# Patient Record
Sex: MALE | Race: BLACK OR AFRICAN AMERICAN | Employment: OTHER | URBAN - METROPOLITAN AREA
[De-identification: names, ages, dates, MRNs, and addresses within clinical notes are randomized per-mention and may not be internally consistent; named-entity substitution may affect disease eponyms.]

---

## 2017-05-11 ENCOUNTER — OFFICE VISIT (OUTPATIENT)
Dept: CARDIOLOGY CLINIC | Age: 67
End: 2017-05-11

## 2017-05-11 VITALS
HEIGHT: 72 IN | BODY MASS INDEX: 34.29 KG/M2 | HEART RATE: 92 BPM | DIASTOLIC BLOOD PRESSURE: 86 MMHG | SYSTOLIC BLOOD PRESSURE: 134 MMHG | WEIGHT: 253.2 LBS

## 2017-05-11 DIAGNOSIS — E78.2 MIXED HYPERLIPIDEMIA: ICD-10-CM

## 2017-05-11 DIAGNOSIS — E66.01 MORBID OBESITY DUE TO EXCESS CALORIES (HCC): ICD-10-CM

## 2017-05-11 DIAGNOSIS — I25.10 CORONARY ARTERY DISEASE INVOLVING NATIVE CORONARY ARTERY OF NATIVE HEART WITHOUT ANGINA PECTORIS: Primary | Chronic | ICD-10-CM

## 2017-05-11 DIAGNOSIS — R06.09 DOE (DYSPNEA ON EXERTION): ICD-10-CM

## 2017-05-11 DIAGNOSIS — R42 DIZZINESS: ICD-10-CM

## 2017-05-11 DIAGNOSIS — I25.10 2-VESSEL CORONARY ARTERY DISEASE: ICD-10-CM

## 2017-05-11 PROCEDURE — 4040F PNEUMOC VAC/ADMIN/RCVD: CPT | Performed by: INTERNAL MEDICINE

## 2017-05-11 PROCEDURE — G8427 DOCREV CUR MEDS BY ELIG CLIN: HCPCS | Performed by: INTERNAL MEDICINE

## 2017-05-11 PROCEDURE — G8419 CALC BMI OUT NRM PARAM NOF/U: HCPCS | Performed by: INTERNAL MEDICINE

## 2017-05-11 PROCEDURE — 1036F TOBACCO NON-USER: CPT | Performed by: INTERNAL MEDICINE

## 2017-05-11 PROCEDURE — G8598 ASA/ANTIPLAT THER USED: HCPCS | Performed by: INTERNAL MEDICINE

## 2017-05-11 PROCEDURE — 1123F ACP DISCUSS/DSCN MKR DOCD: CPT | Performed by: INTERNAL MEDICINE

## 2017-05-11 PROCEDURE — 3017F COLORECTAL CA SCREEN DOC REV: CPT | Performed by: INTERNAL MEDICINE

## 2017-05-11 PROCEDURE — 99213 OFFICE O/P EST LOW 20 MIN: CPT | Performed by: INTERNAL MEDICINE

## 2017-05-11 RX ORDER — ATORVASTATIN CALCIUM 80 MG/1
80 TABLET, FILM COATED ORAL DAILY
Qty: 90 TABLET | Refills: 3 | Status: SHIPPED | OUTPATIENT
Start: 2017-05-11 | End: 2018-03-19 | Stop reason: SDUPTHER

## 2017-05-11 RX ORDER — METOPROLOL TARTRATE 50 MG/1
50 TABLET, FILM COATED ORAL 2 TIMES DAILY
Qty: 180 TABLET | Refills: 3 | Status: SHIPPED | OUTPATIENT
Start: 2017-05-11 | End: 2018-03-18 | Stop reason: SDUPTHER

## 2017-05-21 ENCOUNTER — HOSPITAL ENCOUNTER (OUTPATIENT)
Dept: OTHER | Age: 67
Discharge: HOME | End: 2017-05-21
Attending: CLINIC/CENTER | Admitting: CLINIC/CENTER

## 2017-05-23 LAB
CULTURE: NORMAL
REPORT STATUS: NORMAL
REQUEST PROBLEM: NORMAL
SPECIMEN: NORMAL

## 2017-05-26 ENCOUNTER — HOSPITAL ENCOUNTER (OUTPATIENT)
Dept: GENERAL RADIOLOGY | Age: 67
Discharge: HOME | End: 2017-05-26
Attending: INTERNAL MEDICINE | Admitting: INTERNAL MEDICINE

## 2017-05-26 LAB
ALBUMIN SERPL-MCNC: 4.1 GM/DL (ref 3.4–5)
ALP BLD-CCNC: 112 IU/L (ref 40–128)
ALT SERPL-CCNC: 39 U/L (ref 10–40)
ANION GAP SERPL CALCULATED.3IONS-SCNC: 13 MMOL/L (ref 4–16)
AST SERPL-CCNC: 30 IU/L (ref 15–37)
BASOPHILS ABSOLUTE: 0.1 K/CU MM
BASOPHILS RELATIVE PERCENT: 0.8 % (ref 0–1)
BILIRUB SERPL-MCNC: 0.3 MG/DL (ref 0–1)
BUN BLDV-MCNC: 8 MG/DL (ref 6–23)
CALCIUM SERPL-MCNC: 9.5 MG/DL (ref 8.3–10.6)
CHLORIDE BLD-SCNC: 104 MMOL/L (ref 99–110)
CHOLESTEROL: 120 MG/DL
CO2: 26 MMOL/L (ref 21–32)
CREAT SERPL-MCNC: 0.9 MG/DL (ref 0.9–1.3)
DIFFERENTIAL TYPE: ABNORMAL
EOSINOPHILS ABSOLUTE: 0.1 K/CU MM
EOSINOPHILS RELATIVE PERCENT: 1.5 % (ref 0–3)
GFR AFRICAN AMERICAN: >60 ML/MIN/1.73M2
GFR NON-AFRICAN AMERICAN: >60 ML/MIN/1.73M2
GLUCOSE BLD-MCNC: 118 MG/DL (ref 70–140)
HCT VFR BLD CALC: 46.5 % (ref 42–52)
HDLC SERPL-MCNC: 42 MG/DL
HEMOGLOBIN: 14.6 GM/DL (ref 13.5–18)
IMMATURE NEUTROPHIL %: 1.2 % (ref 0–0.43)
LDL CHOLESTEROL DIRECT: 67 MG/DL
LYMPHOCYTES ABSOLUTE: 2.9 K/CU MM
LYMPHOCYTES RELATIVE PERCENT: 34.2 % (ref 24–44)
MCH RBC QN AUTO: 27.7 PG (ref 27–31)
MCHC RBC AUTO-ENTMCNC: 31.4 % (ref 32–36)
MCV RBC AUTO: 88.1 FL (ref 78–100)
MONOCYTES ABSOLUTE: 0.7 K/CU MM
MONOCYTES RELATIVE PERCENT: 7.9 % (ref 0–4)
NUCLEATED RBC %: 0 %
PDW BLD-RTO: 13 % (ref 11.7–14.9)
PLATELET # BLD: 323 K/CU MM (ref 140–440)
PMV BLD AUTO: 9.6 FL (ref 7.5–11.1)
POTASSIUM SERPL-SCNC: 5.1 MMOL/L (ref 3.5–5.1)
PROSTATE SPECIFIC ANTIGEN: 4.45 NG/ML (ref 0–4)
RBC # BLD: 5.28 M/CU MM (ref 4.6–6.2)
SEGMENTED NEUTROPHILS ABSOLUTE COUNT: 4.6 K/CU MM
SEGMENTED NEUTROPHILS RELATIVE PERCENT: 54.4 % (ref 36–66)
SODIUM BLD-SCNC: 143 MMOL/L (ref 135–145)
TOTAL IMMATURE NEUTOROPHIL: 0.1 K/CU MM
TOTAL NUCLEATED RBC: 0 K/CU MM
TOTAL PROTEIN: 7.1 GM/DL (ref 6.4–8.2)
TRIGL SERPL-MCNC: 69 MG/DL
TSH HIGH SENSITIVITY: 2.54 UIU/ML (ref 0.27–4.2)
WBC # BLD: 8.5 K/CU MM (ref 4–10.5)

## 2017-10-19 ENCOUNTER — HOSPITAL ENCOUNTER (OUTPATIENT)
Dept: GENERAL RADIOLOGY | Age: 67
Discharge: HOME | End: 2017-10-19
Attending: INTERNAL MEDICINE | Admitting: INTERNAL MEDICINE

## 2017-10-19 LAB
ALBUMIN SERPL-MCNC: 4.2 GM/DL (ref 3.4–5)
ALP BLD-CCNC: 114 IU/L (ref 40–128)
ALT SERPL-CCNC: 26 U/L (ref 10–40)
ANION GAP SERPL CALCULATED.3IONS-SCNC: 14 MMOL/L (ref 4–16)
AST SERPL-CCNC: 20 IU/L (ref 15–37)
BILIRUB SERPL-MCNC: 0.3 MG/DL (ref 0–1)
BUN BLDV-MCNC: 12 MG/DL (ref 6–23)
CALCIUM SERPL-MCNC: 9.8 MG/DL (ref 8.3–10.6)
CHLORIDE BLD-SCNC: 103 MMOL/L (ref 99–110)
CHOLESTEROL: 125 MG/DL
CO2: 26 MMOL/L (ref 21–32)
CREAT SERPL-MCNC: 0.9 MG/DL (ref 0.9–1.3)
GFR AFRICAN AMERICAN: >60 ML/MIN/1.73M2
GFR NON-AFRICAN AMERICAN: >60 ML/MIN/1.73M2
GLUCOSE FASTING: 113 MG/DL (ref 70–99)
HDLC SERPL-MCNC: 45 MG/DL
LDL CHOLESTEROL DIRECT: 75 MG/DL
POTASSIUM SERPL-SCNC: 4.5 MMOL/L (ref 3.5–5.1)
SODIUM BLD-SCNC: 143 MMOL/L (ref 135–145)
TOTAL PROTEIN: 6.9 GM/DL (ref 6.4–8.2)
TRIGL SERPL-MCNC: 60 MG/DL

## 2017-11-01 ENCOUNTER — OFFICE VISIT (OUTPATIENT)
Dept: CARDIOLOGY CLINIC | Age: 67
End: 2017-11-01

## 2017-11-01 VITALS
WEIGHT: 239.2 LBS | HEIGHT: 72 IN | HEART RATE: 80 BPM | DIASTOLIC BLOOD PRESSURE: 76 MMHG | BODY MASS INDEX: 32.4 KG/M2 | SYSTOLIC BLOOD PRESSURE: 128 MMHG

## 2017-11-01 DIAGNOSIS — I25.10 CORONARY ARTERY DISEASE INVOLVING NATIVE CORONARY ARTERY OF NATIVE HEART WITHOUT ANGINA PECTORIS: Primary | Chronic | ICD-10-CM

## 2017-11-01 DIAGNOSIS — I25.10 2-VESSEL CORONARY ARTERY DISEASE: ICD-10-CM

## 2017-11-01 DIAGNOSIS — E78.2 MIXED HYPERLIPIDEMIA: ICD-10-CM

## 2017-11-01 PROCEDURE — G8598 ASA/ANTIPLAT THER USED: HCPCS | Performed by: INTERNAL MEDICINE

## 2017-11-01 PROCEDURE — 3017F COLORECTAL CA SCREEN DOC REV: CPT | Performed by: INTERNAL MEDICINE

## 2017-11-01 PROCEDURE — 99214 OFFICE O/P EST MOD 30 MIN: CPT | Performed by: INTERNAL MEDICINE

## 2017-11-01 PROCEDURE — 4040F PNEUMOC VAC/ADMIN/RCVD: CPT | Performed by: INTERNAL MEDICINE

## 2017-11-01 PROCEDURE — 1036F TOBACCO NON-USER: CPT | Performed by: INTERNAL MEDICINE

## 2017-11-01 PROCEDURE — 1123F ACP DISCUSS/DSCN MKR DOCD: CPT | Performed by: INTERNAL MEDICINE

## 2017-11-01 PROCEDURE — G8427 DOCREV CUR MEDS BY ELIG CLIN: HCPCS | Performed by: INTERNAL MEDICINE

## 2017-11-01 PROCEDURE — G8417 CALC BMI ABV UP PARAM F/U: HCPCS | Performed by: INTERNAL MEDICINE

## 2017-11-01 PROCEDURE — G8484 FLU IMMUNIZE NO ADMIN: HCPCS | Performed by: INTERNAL MEDICINE

## 2017-11-01 NOTE — PROGRESS NOTES
Aron Fuentes is a 78 y.o. male who has    CHIEF COMPLAINT AS FOLLOWS:  CHEST PAIN: Patient denies any C/O chest pains at this time. SOB: No C/O SOB at this time. LEG EDEMA: B/L Lower extremity edema is present but no change over previous. PALPITATIONS: Denies any C/O Palpitations                                   DIZZINESS: C/O positional Dizziness but no black out spells. SYNCOPE: None   OTHER:                                     HPI: Patient is here for F/U on his CAD, HTN & Dyslipidemia problems. He does not have any Cardiac complaints at this time. Zachary Tyshawn Tamayo has the following history recorded in care path:  Patient Active Problem List    Diagnosis Date Noted    2-vessel coronary artery disease      Priority: Low    Obesity      Priority: Low    CAD (coronary artery disease)      Priority: Low    Hyperlipidemia      Priority: Low    AGUILAR (dyspnea on exertion)      Priority: Low    Dizziness      Priority: Low     Current Outpatient Prescriptions   Medication Sig Dispense Refill    metoprolol tartrate (LOPRESSOR) 50 MG tablet Take 1 tablet by mouth 2 times daily 180 tablet 3    atorvastatin (LIPITOR) 80 MG tablet Take 1 tablet by mouth daily 90 tablet 3    HYDROcodone-acetaminophen (NORCO) 5-325 MG per tablet Take 1 tablet by mouth every 8 hours as needed for Pain 20 tablet 0    ibuprofen (ADVIL;MOTRIN) 800 MG tablet Take 400 mg by mouth 2 times daily Patient is taking 1/2 tab bid      aspirin 81 MG tablet Take 81 mg by mouth daily.  Travoprost, BAK Free, (TRAVATAN Z) 0.004 % SOLN ophthalmic solution Place 1 drop into both eyes nightly. No current facility-administered medications for this visit. Allergies: Review of patient's allergies indicates no known allergies.   Past Medical History:   Diagnosis Date    2-vessel coronary artery disease     Mild to moderate grossly intact. There were no gross focal neurologic abnormalities. Lab Review   Lab Results   Component Value Date    ONFSLGG 026 02/09/2011    CKMB 21.6 02/09/2011    TROPONINI <0.006 02/09/2011     BNP:    Lab Results   Component Value Date    BNP 5 02/09/2011     PT/INR:    Lab Results   Component Value Date    INR 1.06 02/09/2011     No results found for: LABA1C  Lab Results   Component Value Date    WBC 8.5 05/26/2017    HCT 46.5 05/26/2017    MCV 88.1 05/26/2017     05/26/2017     Lab Results   Component Value Date    CHOL 125 10/19/2017    TRIG 60 10/19/2017    HDL 45 10/19/2017    LDLDIRECT 75 10/19/2017     Lab Results   Component Value Date    ALT 26 10/19/2017    AST 20 10/19/2017     BMP:    Lab Results   Component Value Date     10/19/2017    K 4.5 10/19/2017     10/19/2017    CO2 26 10/19/2017    BUN 12 10/19/2017    CREATININE 0.9 10/19/2017     CMP:   Lab Results   Component Value Date     10/19/2017    K 4.5 10/19/2017     10/19/2017    CO2 26 10/19/2017    BUN 12 10/19/2017    PROT 6.9 10/19/2017    PROT 6.6 07/09/2011     TSH:  No results found for: TSH    QUALITY MEASURES REVIEWED:  1.CAD:Patient is taking anti platelet agent:Yes  2. DYSLIPIDEMIA: Patient is on cholesterol lowering medication:Yes  3. Beta-Blocker therapy for CAD, if prior Myocardial Infarction:Yes  4. Counselled regarding smoking cessation. 5. Atrial fibrillation & anticoagulation therapy No  6. Discussed weight management strategies. Impression:    1. Coronary artery disease involving native coronary artery of native heart without angina pectoris    2.  Mixed hyperlipidemia    3. 2-vessel coronary artery disease       Patient Active Problem List   Diagnosis Code    CAD (coronary artery disease) I25.10    Hyperlipidemia E78.5    AGUILAR (dyspnea on exertion) R06.09    Dizziness R42    2-vessel coronary artery disease I25.10    Obesity E66.9       Assessment & Plan:    CAD:Yes   clinically stable. Patient is on optimal medical regimen ( see medication list above )  -     CORONARY ARTERY DISEASE: Patient is currently  asymptomatic from CAD. - changes in  treatment:   no           - Testing ordered:  no  [facility] classification: 1  [place] RISK SCORE:  SAAD RISK SCORE:  HTN:well controlled on current medical regimen, see list above. - changes in  treatment:   yes,     VHD: No significant VHD noted  DYSLIPIDEMIA: Patient's profile is at / near [place],                                                          Tolerating current medical regimen wellyes,                                                             See most recent Lab values in Labs section above. OTHER RELEVANT DIAGNOSIS:as noted in patient's active problem list: Obesity : Lost 15 pounds weight. TESTS ORDERED: None this visit                                    All previously ordered tests reviewed. MEDICATIONS: CPM   Office f/u in six months. Primary/secondary prevention is the goal by aggressive risk modification, healthy and therapeutic life style changes for cardiovascular risk reduction. Various goals are discussed and multiple questions answered.

## 2017-11-01 NOTE — LETTER
Cardiology [address] [place] [address] [address] [facility] [address]  Phone: [phone]  Fax: [phone]    Dung Mckenna MD        November 1, 2017     Victor Manuel Herbert, [facility]  [address] [facility]    Patient: Evan Logan  MR Number: [id number]  YOB: 1950  Date of Visit: 11/1/2017    Dear Dr. Victor Manuel Herbert:    Thank you for the request for consultation for Patricio Rod to me for the evaluation of CAD. Below are the relevant portions of my assessment and plan of care. If you have questions, please do not hesitate to call me. I look forward to following Nate Goldman along with you.     Sincerely,        Dung Mckenna MD

## 2018-03-09 ENCOUNTER — HOSPITAL ENCOUNTER (OUTPATIENT)
Dept: ULTRASOUND IMAGING | Age: 68
Discharge: HOME | End: 2018-03-09
Attending: SPECIALIST | Admitting: SPECIALIST

## 2018-03-09 DIAGNOSIS — N43.40 SPERMATOCELE: ICD-10-CM

## 2018-03-19 RX ORDER — ATORVASTATIN CALCIUM 80 MG/1
80 TABLET, FILM COATED ORAL DAILY
Qty: 90 TABLET | Refills: 3 | Status: SHIPPED | OUTPATIENT
Start: 2018-03-19 | End: 2018-11-29

## 2018-03-19 RX ORDER — METOPROLOL TARTRATE 50 MG/1
50 TABLET, FILM COATED ORAL 2 TIMES DAILY
Qty: 180 TABLET | Refills: 3 | Status: SHIPPED | OUTPATIENT
Start: 2018-03-19 | End: 2019-03-14 | Stop reason: SDUPTHER

## 2018-05-10 ENCOUNTER — OFFICE VISIT (OUTPATIENT)
Dept: CARDIOLOGY CLINIC | Age: 68
End: 2018-05-10

## 2018-05-10 VITALS
BODY MASS INDEX: 33.05 KG/M2 | SYSTOLIC BLOOD PRESSURE: 130 MMHG | HEIGHT: 72 IN | WEIGHT: 244 LBS | HEART RATE: 108 BPM | DIASTOLIC BLOOD PRESSURE: 80 MMHG

## 2018-05-10 DIAGNOSIS — I25.10 CORONARY ARTERY DISEASE INVOLVING NATIVE CORONARY ARTERY OF NATIVE HEART WITHOUT ANGINA PECTORIS: Primary | Chronic | ICD-10-CM

## 2018-05-10 DIAGNOSIS — E66.09 CLASS 1 OBESITY DUE TO EXCESS CALORIES WITH SERIOUS COMORBIDITY AND BODY MASS INDEX (BMI) OF 33.0 TO 33.9 IN ADULT: ICD-10-CM

## 2018-05-10 DIAGNOSIS — E78.2 MIXED HYPERLIPIDEMIA: ICD-10-CM

## 2018-05-10 PROCEDURE — 99213 OFFICE O/P EST LOW 20 MIN: CPT | Performed by: INTERNAL MEDICINE

## 2018-05-10 PROCEDURE — G8427 DOCREV CUR MEDS BY ELIG CLIN: HCPCS | Performed by: INTERNAL MEDICINE

## 2018-05-10 PROCEDURE — G8598 ASA/ANTIPLAT THER USED: HCPCS | Performed by: INTERNAL MEDICINE

## 2018-05-10 PROCEDURE — 4040F PNEUMOC VAC/ADMIN/RCVD: CPT | Performed by: INTERNAL MEDICINE

## 2018-05-10 PROCEDURE — 1036F TOBACCO NON-USER: CPT | Performed by: INTERNAL MEDICINE

## 2018-05-10 PROCEDURE — 93000 ELECTROCARDIOGRAM COMPLETE: CPT | Performed by: INTERNAL MEDICINE

## 2018-05-10 PROCEDURE — G8417 CALC BMI ABV UP PARAM F/U: HCPCS | Performed by: INTERNAL MEDICINE

## 2018-05-10 PROCEDURE — 3017F COLORECTAL CA SCREEN DOC REV: CPT | Performed by: INTERNAL MEDICINE

## 2018-05-10 PROCEDURE — 1123F ACP DISCUSS/DSCN MKR DOCD: CPT | Performed by: INTERNAL MEDICINE

## 2018-05-10 RX ORDER — ZOLPIDEM TARTRATE 10 MG/1
5 TABLET ORAL NIGHTLY PRN
Status: ON HOLD | COMMUNITY
End: 2021-01-05 | Stop reason: HOSPADM

## 2018-08-16 ENCOUNTER — HOSPITAL ENCOUNTER (OUTPATIENT)
Dept: GENERAL RADIOLOGY | Age: 68
Discharge: HOME | End: 2018-08-16
Attending: INTERNAL MEDICINE | Admitting: INTERNAL MEDICINE

## 2018-08-16 LAB
ALBUMIN SERPL-MCNC: 4.2 GM/DL (ref 3.4–5)
ALP BLD-CCNC: 110 IU/L (ref 40–129)
ALT SERPL-CCNC: 30 U/L (ref 10–40)
AST SERPL-CCNC: 22 IU/L (ref 15–37)
BILIRUB SERPL-MCNC: 0.3 MG/DL (ref 0–1)
BILIRUBIN DIRECT: 0.2 MG/DL (ref 0–0.3)
BILIRUBIN, INDIRECT: 0.1 MG/DL (ref 0–0.7)
CHOLESTEROL: 135 MG/DL
HDLC SERPL-MCNC: 43 MG/DL
LDL CHOLESTEROL DIRECT: 93 MG/DL
TOTAL PROTEIN: 6.7 GM/DL (ref 6.4–8.2)
TRIGL SERPL-MCNC: 102 MG/DL

## 2018-11-29 ENCOUNTER — OFFICE VISIT (OUTPATIENT)
Dept: CARDIOLOGY CLINIC | Age: 68
End: 2018-11-29
Payer: MEDICARE

## 2018-11-29 ENCOUNTER — TELEPHONE (OUTPATIENT)
Dept: CARDIOLOGY CLINIC | Age: 68
End: 2018-11-29

## 2018-11-29 VITALS
WEIGHT: 234.4 LBS | HEIGHT: 72 IN | SYSTOLIC BLOOD PRESSURE: 120 MMHG | DIASTOLIC BLOOD PRESSURE: 80 MMHG | BODY MASS INDEX: 31.75 KG/M2 | HEART RATE: 80 BPM

## 2018-11-29 DIAGNOSIS — E78.2 MIXED HYPERLIPIDEMIA: ICD-10-CM

## 2018-11-29 DIAGNOSIS — E66.09 CLASS 1 OBESITY DUE TO EXCESS CALORIES WITH SERIOUS COMORBIDITY AND BODY MASS INDEX (BMI) OF 33.0 TO 33.9 IN ADULT: ICD-10-CM

## 2018-11-29 DIAGNOSIS — I25.10 CORONARY ARTERY DISEASE INVOLVING NATIVE CORONARY ARTERY OF NATIVE HEART WITHOUT ANGINA PECTORIS: Primary | Chronic | ICD-10-CM

## 2018-11-29 DIAGNOSIS — I25.10 2-VESSEL CORONARY ARTERY DISEASE: ICD-10-CM

## 2018-11-29 DIAGNOSIS — R06.09 DOE (DYSPNEA ON EXERTION): ICD-10-CM

## 2018-11-29 PROCEDURE — G8427 DOCREV CUR MEDS BY ELIG CLIN: HCPCS | Performed by: INTERNAL MEDICINE

## 2018-11-29 PROCEDURE — G8417 CALC BMI ABV UP PARAM F/U: HCPCS | Performed by: INTERNAL MEDICINE

## 2018-11-29 PROCEDURE — G8598 ASA/ANTIPLAT THER USED: HCPCS | Performed by: INTERNAL MEDICINE

## 2018-11-29 PROCEDURE — 99214 OFFICE O/P EST MOD 30 MIN: CPT | Performed by: INTERNAL MEDICINE

## 2018-11-29 PROCEDURE — 4040F PNEUMOC VAC/ADMIN/RCVD: CPT | Performed by: INTERNAL MEDICINE

## 2018-11-29 PROCEDURE — 1123F ACP DISCUSS/DSCN MKR DOCD: CPT | Performed by: INTERNAL MEDICINE

## 2018-11-29 PROCEDURE — 1101F PT FALLS ASSESS-DOCD LE1/YR: CPT | Performed by: INTERNAL MEDICINE

## 2018-11-29 PROCEDURE — 3017F COLORECTAL CA SCREEN DOC REV: CPT | Performed by: INTERNAL MEDICINE

## 2018-11-29 PROCEDURE — G8484 FLU IMMUNIZE NO ADMIN: HCPCS | Performed by: INTERNAL MEDICINE

## 2018-11-29 PROCEDURE — 1036F TOBACCO NON-USER: CPT | Performed by: INTERNAL MEDICINE

## 2018-11-29 RX ORDER — PRAVASTATIN SODIUM 40 MG
40 TABLET ORAL DAILY
Qty: 30 TABLET | Refills: 3 | Status: SHIPPED | OUTPATIENT
Start: 2018-11-29 | End: 2019-09-19 | Stop reason: SDUPTHER

## 2018-11-29 RX ORDER — ROSUVASTATIN CALCIUM 40 MG/1
40 TABLET, COATED ORAL EVERY EVENING
Qty: 30 TABLET | Refills: 5 | Status: SHIPPED | OUTPATIENT
Start: 2018-11-29 | End: 2018-11-29 | Stop reason: SINTOL

## 2018-11-29 NOTE — PROGRESS NOTES
nasal congestion, sinus pain or vertigo  Eyes: Negative for visual disturbance. Endocrine: Negative for polydipsia/polyuria  Respiratory: Negative for shortness of breath  Cardiovascular: Negative for chest pain, dyspnea on exertion, claudication, edema, irregular heartbeat, murmur, palpitations or shortness of breath  Gastrointestinal: Negative for abdominal pain or heartburn  Genito-Urinary: Negative for urinary frequency/urgency  Musculoskeletal: Negative for muscle pain, muscular weakness, negative for pain in arm and leg or swelling in foot and leg  Neurological: Negative for dizziness, headaches, memory loss, numbness/tingling, visual changes, syncope  Dermatological: Negative for rash    Objective:  /80   Pulse 80   Ht 6' (1.829 m)   Wt 234 lb 6.4 oz (106.3 kg)   BMI 31.79 kg/m²   Wt Readings from Last 3 Encounters:   11/29/18 234 lb 6.4 oz (106.3 kg)   05/10/18 244 lb (110.7 kg)   11/01/17 239 lb 3.2 oz (108.5 kg)     Body mass index is 31.79 kg/m². GENERAL - Alert, oriented, pleasant, in no apparent distress. EYES: No jaundice, no conjunctival pallor. SKIN: It is warm & dry. No rashes. No Echhymosis    HEENT - No clinically significant abnormalities seen. Neck - Supple. No jugular venous distention noted. No carotid bruits. Cardiovascular - Normal S1 and S2 without obvious murmur or gallop. Extremities - No cyanosis, clubbing, or significant edema. Pulmonary - No respiratory distress. No wheezes or rales. Abdomen - No masses, tenderness, or organomegaly. Musculoskeletal - No significant edema. No joint deformities. No muscle wasting. Neurologic - Cranial nerves II through XII are grossly intact. There were no gross focal neurologic abnormalities.     Lab Review   Lab Results   Component Value Date    IHFPESP 262 02/09/2011    CKMB 21.6 02/09/2011     BNP:    Lab Results   Component Value Date    BNP 5 02/09/2011     PT/INR:    Lab Results   Component Value Date    INR 1.06 treatment:   no           - Testing ordered:  no  [facility] classification: 1  [place] RISK SCORE:  SAAD RISK SCORE:  HTN:well controlled on current medical regimen, see list above. - changes in  treatment:   no   CARDIOMYOPATHY: None known   CONGESTIVE HEART FAILURE: NO KNOWN HISTORY.      VHD: No significant VHD noted  DYSLIPIDEMIA: Patient's profile is at / near [place],                                 HDL is low                                Tolerating current medical regimen wellyes,                                 Does not tolerate statin medications well due to side effects                                See most recent Lab values in Labs section above. OTHER RELEVANT DIAGNOSIS:as noted in patient's active problem list:  TESTS ORDERED: None this visit                                         All previously ordered tests reviewed. ARRHYTHMIAS: None known   MEDICATIONS: Patient does not want to try PCSK9 products. Willing to try Crestor 40 mg daily. Office f/u in six months. Primary/secondary prevention is the goal by aggressive risk modification, healthy and therapeutic life style changes for cardiovascular risk reduction. Various goals are discussed and multiple questions answered.

## 2019-03-14 RX ORDER — METOPROLOL TARTRATE 50 MG/1
50 TABLET, FILM COATED ORAL 2 TIMES DAILY
Qty: 180 TABLET | Refills: 3 | Status: SHIPPED | OUTPATIENT
Start: 2019-03-14 | End: 2020-02-07

## 2019-07-31 ENCOUNTER — OFFICE VISIT (OUTPATIENT)
Dept: CARDIOLOGY CLINIC | Age: 69
End: 2019-07-31
Payer: MEDICARE

## 2019-07-31 VITALS
HEIGHT: 72 IN | BODY MASS INDEX: 31.1 KG/M2 | HEART RATE: 88 BPM | SYSTOLIC BLOOD PRESSURE: 134 MMHG | DIASTOLIC BLOOD PRESSURE: 84 MMHG | WEIGHT: 229.6 LBS

## 2019-07-31 DIAGNOSIS — E78.2 MIXED HYPERLIPIDEMIA: ICD-10-CM

## 2019-07-31 DIAGNOSIS — R06.09 DOE (DYSPNEA ON EXERTION): ICD-10-CM

## 2019-07-31 DIAGNOSIS — R42 DIZZINESS: ICD-10-CM

## 2019-07-31 DIAGNOSIS — E66.09 CLASS 1 OBESITY DUE TO EXCESS CALORIES WITH SERIOUS COMORBIDITY AND BODY MASS INDEX (BMI) OF 33.0 TO 33.9 IN ADULT: ICD-10-CM

## 2019-07-31 DIAGNOSIS — I25.10 CORONARY ARTERY DISEASE INVOLVING NATIVE CORONARY ARTERY OF NATIVE HEART WITHOUT ANGINA PECTORIS: Primary | Chronic | ICD-10-CM

## 2019-07-31 DIAGNOSIS — I25.10 2-VESSEL CORONARY ARTERY DISEASE: ICD-10-CM

## 2019-07-31 PROCEDURE — 3017F COLORECTAL CA SCREEN DOC REV: CPT | Performed by: INTERNAL MEDICINE

## 2019-07-31 PROCEDURE — 1036F TOBACCO NON-USER: CPT | Performed by: INTERNAL MEDICINE

## 2019-07-31 PROCEDURE — 1123F ACP DISCUSS/DSCN MKR DOCD: CPT | Performed by: INTERNAL MEDICINE

## 2019-07-31 PROCEDURE — 4040F PNEUMOC VAC/ADMIN/RCVD: CPT | Performed by: INTERNAL MEDICINE

## 2019-07-31 PROCEDURE — G8417 CALC BMI ABV UP PARAM F/U: HCPCS | Performed by: INTERNAL MEDICINE

## 2019-07-31 PROCEDURE — 99213 OFFICE O/P EST LOW 20 MIN: CPT | Performed by: INTERNAL MEDICINE

## 2019-07-31 PROCEDURE — G8427 DOCREV CUR MEDS BY ELIG CLIN: HCPCS | Performed by: INTERNAL MEDICINE

## 2019-07-31 PROCEDURE — G8598 ASA/ANTIPLAT THER USED: HCPCS | Performed by: INTERNAL MEDICINE

## 2019-07-31 NOTE — PROGRESS NOTES
through XII are grossly intact. There were no gross focal neurologic abnormalities. Lab Review   Lab Results   Component Value Date    CKTOTAL 621 02/09/2011    CKMB 21.6 02/09/2011     BNP:    Lab Results   Component Value Date    BNP 5 02/09/2011     PT/INR:    Lab Results   Component Value Date    INR 1.06 02/09/2011     No results found for: LABA1C  Lab Results   Component Value Date    WBC 8.5 05/26/2017    WBC 8.5 06/14/2016    HCT 46.5 05/26/2017    HCT 46.4 06/14/2016    MCV 88.1 05/26/2017    MCV 86.6 06/14/2016     05/26/2017     06/14/2016     Lab Results   Component Value Date    CHOL 135 08/16/2018    CHOL 125 10/19/2017    TRIG 102 08/16/2018    TRIG 60 10/19/2017    HDL 43 08/16/2018    HDL 45 10/19/2017    LDLDIRECT 93 08/16/2018    LDLDIRECT 75 10/19/2017     Lab Results   Component Value Date    ALT 30 08/16/2018    ALT 26 10/19/2017    AST 22 08/16/2018    AST 20 10/19/2017     BMP:    Lab Results   Component Value Date     10/19/2017     05/26/2017    K 4.5 10/19/2017    K 5.1 05/26/2017     10/19/2017     05/26/2017    CO2 26 10/19/2017    CO2 26 05/26/2017    BUN 12 10/19/2017    BUN 8 05/26/2017    CREATININE 0.9 10/19/2017    CREATININE 0.9 05/26/2017     CMP:   Lab Results   Component Value Date     10/19/2017     05/26/2017    K 4.5 10/19/2017    K 5.1 05/26/2017     10/19/2017     05/26/2017    CO2 26 10/19/2017    CO2 26 05/26/2017    BUN 12 10/19/2017    BUN 8 05/26/2017    PROT 6.7 08/16/2018    PROT 6.9 10/19/2017    PROT 6.6 07/09/2011     TSH:    Lab Results   Component Value Date    TSHHS 2.540 05/26/2017    TSHHS 2.760 06/14/2016       QUALITY MEASURES REVIEWED:  1.CAD:Patient is taking anti platelet agent:Yes  2. DYSLIPIDEMIA: Patient is on cholesterol lowering medication:Yes  3. Beta-Blocker therapy for CAD, if prior Myocardial Infarction:Yes  4. Atrial fibrillation & anticoagulation therapy No  5. Discussed weight

## 2019-09-21 RX ORDER — PRAVASTATIN SODIUM 40 MG
40 TABLET ORAL DAILY
Qty: 30 TABLET | Refills: 11 | Status: SHIPPED | OUTPATIENT
Start: 2019-09-21 | End: 2020-11-18

## 2019-10-11 ENCOUNTER — HOSPITAL ENCOUNTER (OUTPATIENT)
Dept: NUCLEAR MEDICINE | Age: 69
Discharge: HOME | End: 2019-10-11
Payer: MEDICARE

## 2019-10-11 DIAGNOSIS — Z96.652 PRESENCE OF LEFT ARTIFICIAL KNEE JOINT: ICD-10-CM

## 2019-10-11 PROCEDURE — A9503 TC99M MEDRONATE: HCPCS | Performed by: ORTHOPAEDIC SURGERY

## 2019-10-11 PROCEDURE — 78315 BONE IMAGING 3 PHASE: CPT

## 2019-10-11 PROCEDURE — 3430000000 HC RX DIAGNOSTIC RADIOPHARMACEUTICAL: Performed by: ORTHOPAEDIC SURGERY

## 2019-10-11 RX ORDER — TC 99M MEDRONATE 20 MG/10ML
25 INJECTION, POWDER, LYOPHILIZED, FOR SOLUTION INTRAVENOUS
Status: COMPLETED | OUTPATIENT
Start: 2019-10-11 | End: 2019-10-11

## 2019-10-11 RX ADMIN — TC 99M MEDRONATE 25 MILLICURIE: 20 INJECTION, POWDER, LYOPHILIZED, FOR SOLUTION INTRAVENOUS at 09:45

## 2019-12-18 ENCOUNTER — TELEPHONE (OUTPATIENT)
Dept: CARDIOLOGY CLINIC | Age: 69
End: 2019-12-18

## 2019-12-18 NOTE — TELEPHONE ENCOUNTER
Cardiologist: Dr. Mary Mullins  Surgeon: Dr. Yoan Carrington: Lt Total Knee Arthroplasty Revision  Anesthesia: General  Date: TBD  FAX# [phone]  Ph# [phone]     Last OV 7/31/19 w/ Owen    Needs OV and EKG     Assessment & Plan:    CAD:Yes    HTN:well controlled on current medical regimen, see list above.               DYSLIPIDEMIA: Patient's profile is at / near [address]                                Tolerating current medical regimen wellyes,                                                                   MEDICATIONS: ASA   Office f/u in six months.      NM 11/14/16 Normal study.   EF 65)      Echo 11/14/16  EF 55-60%  Trace MR & TR

## 2019-12-19 ENCOUNTER — TELEPHONE (OUTPATIENT)
Dept: CARDIOLOGY CLINIC | Age: 69
End: 2019-12-19

## 2019-12-31 ENCOUNTER — OFFICE VISIT (OUTPATIENT)
Dept: CARDIOLOGY CLINIC | Age: 69
End: 2019-12-31
Payer: MEDICARE

## 2019-12-31 VITALS
WEIGHT: 206.8 LBS | DIASTOLIC BLOOD PRESSURE: 80 MMHG | HEIGHT: 72 IN | HEART RATE: 72 BPM | BODY MASS INDEX: 28.01 KG/M2 | SYSTOLIC BLOOD PRESSURE: 160 MMHG

## 2019-12-31 DIAGNOSIS — E66.09 CLASS 1 OBESITY DUE TO EXCESS CALORIES WITH SERIOUS COMORBIDITY AND BODY MASS INDEX (BMI) OF 33.0 TO 33.9 IN ADULT: ICD-10-CM

## 2019-12-31 DIAGNOSIS — I25.10 CORONARY ARTERY DISEASE INVOLVING NATIVE CORONARY ARTERY OF NATIVE HEART WITHOUT ANGINA PECTORIS: Chronic | ICD-10-CM

## 2019-12-31 DIAGNOSIS — E78.2 MIXED HYPERLIPIDEMIA: ICD-10-CM

## 2019-12-31 PROCEDURE — 1036F TOBACCO NON-USER: CPT | Performed by: NURSE PRACTITIONER

## 2019-12-31 PROCEDURE — 3017F COLORECTAL CA SCREEN DOC REV: CPT | Performed by: NURSE PRACTITIONER

## 2019-12-31 PROCEDURE — G8598 ASA/ANTIPLAT THER USED: HCPCS | Performed by: NURSE PRACTITIONER

## 2019-12-31 PROCEDURE — G8417 CALC BMI ABV UP PARAM F/U: HCPCS | Performed by: NURSE PRACTITIONER

## 2019-12-31 PROCEDURE — G8427 DOCREV CUR MEDS BY ELIG CLIN: HCPCS | Performed by: NURSE PRACTITIONER

## 2019-12-31 PROCEDURE — G8484 FLU IMMUNIZE NO ADMIN: HCPCS | Performed by: NURSE PRACTITIONER

## 2019-12-31 PROCEDURE — 99213 OFFICE O/P EST LOW 20 MIN: CPT | Performed by: NURSE PRACTITIONER

## 2019-12-31 PROCEDURE — 4040F PNEUMOC VAC/ADMIN/RCVD: CPT | Performed by: NURSE PRACTITIONER

## 2019-12-31 PROCEDURE — 1123F ACP DISCUSS/DSCN MKR DOCD: CPT | Performed by: NURSE PRACTITIONER

## 2019-12-31 PROCEDURE — 93000 ELECTROCARDIOGRAM COMPLETE: CPT | Performed by: INTERNAL MEDICINE

## 2020-01-02 ENCOUNTER — PROCEDURE VISIT (OUTPATIENT)
Dept: CARDIOLOGY CLINIC | Age: 70
End: 2020-01-02
Payer: MEDICARE

## 2020-01-02 ENCOUNTER — TELEPHONE (OUTPATIENT)
Dept: CARDIOLOGY CLINIC | Age: 70
End: 2020-01-02

## 2020-01-02 LAB
LV EF: 60 %
LVEF MODALITY: NORMAL

## 2020-01-02 PROCEDURE — 78452 HT MUSCLE IMAGE SPECT MULT: CPT | Performed by: INTERNAL MEDICINE

## 2020-01-02 PROCEDURE — 93015 CV STRESS TEST SUPVJ I&R: CPT | Performed by: INTERNAL MEDICINE

## 2020-01-02 PROCEDURE — A9500 TC99M SESTAMIBI: HCPCS | Performed by: INTERNAL MEDICINE

## 2020-01-02 NOTE — TELEPHONE ENCOUNTER
Patient is cleared for surgery/procedure at a low to moderate risk per Latoya Offer, APRN-CNP.   Letter completed and faxed

## 2020-01-02 NOTE — TELEPHONE ENCOUNTER
Advised patient of results. Patient voiced understanding. Supervising physician Dr. Lavell Carbajal . Normal Stress nuclear scintigraphic study suggestive of normal myocardial perfusion. Gated images demonstrate normal left ventricular systolic function with EF of 60 %. Recommendation Continue present medical therapy and followup in office as scheduled.

## 2020-02-10 RX ORDER — METOPROLOL TARTRATE 50 MG/1
50 TABLET, FILM COATED ORAL 2 TIMES DAILY
Qty: 180 TABLET | Refills: 3 | Status: SHIPPED | OUTPATIENT
Start: 2020-02-10 | End: 2021-02-22 | Stop reason: SDUPTHER

## 2020-07-21 ENCOUNTER — VIRTUAL VISIT (OUTPATIENT)
Dept: CARDIOLOGY CLINIC | Age: 70
End: 2020-07-21
Payer: MEDICARE

## 2020-07-21 PROCEDURE — 3017F COLORECTAL CA SCREEN DOC REV: CPT | Performed by: INTERNAL MEDICINE

## 2020-07-21 PROCEDURE — 99213 OFFICE O/P EST LOW 20 MIN: CPT | Performed by: INTERNAL MEDICINE

## 2020-07-21 PROCEDURE — 4040F PNEUMOC VAC/ADMIN/RCVD: CPT | Performed by: INTERNAL MEDICINE

## 2020-07-21 PROCEDURE — G8427 DOCREV CUR MEDS BY ELIG CLIN: HCPCS | Performed by: INTERNAL MEDICINE

## 2020-07-21 PROCEDURE — 1123F ACP DISCUSS/DSCN MKR DOCD: CPT | Performed by: INTERNAL MEDICINE

## 2020-07-21 RX ORDER — TRAMADOL HYDROCHLORIDE 50 MG/1
50 TABLET ORAL EVERY 6 HOURS PRN
Status: ON HOLD | COMMUNITY
End: 2020-12-20 | Stop reason: ALTCHOICE

## 2020-07-21 NOTE — LETTER
[address]  [address] [address]  Phone: [phone]  Fax: [phone]    Francine Shoemaker MD        July 21, 2020     [facility] [place] [address] [address] [id number]    Patient: Alisa Sullivan  MR Number: [id number]  YOB: 1950  Date of Visit: 7/21/2020    Dear Dr. Aramis Bettencourt:    Thank you for the request for consultation for Kayla Farley to me for the evaluation of CAD. Below are the relevant portions of my assessment and plan of care. If you have questions, please do not hesitate to call me. I look forward to following Moise Thurman along with you.     Sincerely,        Francine Shoemaker MD

## 2020-07-21 NOTE — PATIENT INSTRUCTIONS
CAD:Yes   clinically stable. Patient is on optimal medical regimen ( see medication list above )  -     CORONARY ARTERY DISEASE: Patient is currently  asymptomatic from CAD. - changes in  treatment:   no           - Testing ordered:  no  [facility] classification: 1  [place] RISK SCORE:  SAAD RISK SCORE:  HTN:well controlled on current medical regimen, see list above. - changes in  treatment:   no   CARDIOMYOPATHY: None known   CONGESTIVE HEART FAILURE: NO KNOWN HISTORY.      VHD: No significant VHD noted  DYSLIPIDEMIA: Patient's profile is at / near [employer],                                                               Tolerating current medical regimen wellyes,                                                               See most recent Lab values in Labs section above. OTHER RELEVANT DIAGNOSIS:as noted in patient's active problem list:  TESTS ORDERED: None this visit                                              All previously ordered tests reviewed. ARRHYTHMIAS: None known   MEDICATIONS: CPM   Office f/u in six months. Primary/secondary prevention is the goal by aggressive risk modification, healthy and therapeutic life style changes for cardiovascular risk reduction. Various goals are discussed and multiple questions answered.

## 2020-07-21 NOTE — PROGRESS NOTES
 2-vessel coronary artery disease     Mild to moderate    CAD (coronary artery disease)     Dizziness     AGUILAR (dyspnea on exertion)     H/O cardiovascular stress test 8/25/2013    thallium, normal EF59%    H/O echocardiogram 9/19/12     EF45-50% LVH, sclerotic AV and normal MV, trace TR and MR    History of cardiac catheterization 9/30/08    History of cardiovascular stress test 5/13/10, 8/8/08    The post stress myocardial perfusion images show a normal pattern of perfusion in all regions. The post stress left ventricle is normal in size. There is no scintigraphic evidence of inducible myocardial ischemia. This is a new change over previous study. No wall motion abnormalities seen. The rest ejection fraction is 49%. Global left ventricular systolic function is normal. Exercise capacity 5 METS.  History of echocardiogram 5/11/11, 4/8/10, 11/18/09, 8/7/08    normal left ventricle dimensions with preserved systolic function. LV ejection fraction is approximately 50-55%. Left ventricle wall is hypertrophic. Diastolic dysfunction is present. Normal right ventricle. Mildly enlarged left atrium. Sclerotic aortic valve and normal mitral valve. no pericardial effusion or mural thrombus. Doppler eval reveals trace tricuspid regurg. and trace mitral regurg.      History of echocardiogram 11/14/2016    EF55%-Trace MR&TR    History of nuclear stress test 11/15/2016    lexiscan-normal,EF65%    History of nuclear stress test 01/02/2020    EF 60%, Normal    Hyperlipidemia     Obesity      Past Surgical History:   Procedure Laterality Date    COLONOSCOPY  6/2010    TOTAL KNEE ARTHROPLASTY  2005    left knee      As reviewed   Family History   Problem Relation Age of Onset    Alzheimer's Disease Mother     Alzheimer's Disease Father     Coronary Art Dis Brother     Heart Failure Brother         CABG    Coronary Art Dis Brother     Heart Failure Brother         CABG    Coronary Art Dis Brother     Heart Failure Brother         CABG     Social History     Tobacco Use    Smoking status: Never Smoker    Smokeless tobacco: Never Used    Tobacco comment: 01/24/13   Substance Use Topics    Alcohol use: Yes     Alcohol/week: 0.0 standard drinks     Comment: rarely      Review of Systems:    Constitutional: Negative for diaphoresis and fatigue  Psychological:Negative for anxiety or depression  HENT: Negative for headaches, nasal congestion, sinus pain or vertigo  Eyes: Negative for visual disturbance. Endocrine: Negative for polydipsia/polyuria  Respiratory: Negative for shortness of breath  Cardiovascular: Negative for chest pain, dyspnea on exertion, claudication, edema, irregular heartbeat, murmur, palpitations or shortness of breath  Gastrointestinal: Negative for abdominal pain or heartburn  Genito-Urinary: Negative for urinary frequency/urgency  Musculoskeletal: Negative for muscle pain, muscular weakness, negative for pain in arm and leg or swelling in foot and leg  Neurological: Negative for dizziness, headaches, memory loss, numbness/tingling, visual changes, syncope  Dermatological: Negative for rash    Objective: Wt Readings from Last 3 Encounters:   12/31/19 206 lb 12.8 oz (93.8 kg)   07/31/19 229 lb 9.6 oz (104.1 kg)   11/29/18 234 lb 6.4 oz (106.3 kg)     There is no height or weight on file to calculate BMI. Patient-Reported Vitals 7/21/2020   Patient-Reported Weight 215 lbs   Patient-Reported Height 6'   Patient-Reported Systolic 018   Patient-Reported Diastolic 77   Patient-Reported Pulse 83         GENERAL - Alert, oriented, pleasant, in no apparent distress.     Lab Review   Lab Results   Component Value Date    SPUIAAD 595 02/09/2011    CKMB 21.6 02/09/2011     BNP:    Lab Results   Component Value Date    BNP 5 02/09/2011     PT/INR:    Lab Results   Component Value Date    INR 1.06 02/09/2011     No results found for: LABA1C  Lab Results   Component Value Date    WBC 8.5 05/26/2017    WBC 8.5 06/14/2016    HCT 46.5 05/26/2017    HCT 46.4 06/14/2016    MCV 88.1 05/26/2017    MCV 86.6 06/14/2016     05/26/2017     06/14/2016     Lab Results   Component Value Date    CHOL 135 08/16/2018    CHOL 125 10/19/2017    TRIG 102 08/16/2018    TRIG 60 10/19/2017    HDL 43 08/16/2018    HDL 45 10/19/2017    LDLDIRECT 93 08/16/2018    LDLDIRECT 75 10/19/2017     Lab Results   Component Value Date    ALT 30 08/16/2018    ALT 26 10/19/2017    AST 22 08/16/2018    AST 20 10/19/2017     BMP:    Lab Results   Component Value Date     10/19/2017     05/26/2017    K 4.5 10/19/2017    K 5.1 05/26/2017     10/19/2017     05/26/2017    CO2 26 10/19/2017    CO2 26 05/26/2017    BUN 12 10/19/2017    BUN 8 05/26/2017    CREATININE 0.9 10/19/2017    CREATININE 0.9 05/26/2017     CMP:   Lab Results   Component Value Date     10/19/2017     05/26/2017    K 4.5 10/19/2017    K 5.1 05/26/2017     10/19/2017     05/26/2017    CO2 26 10/19/2017    CO2 26 05/26/2017    BUN 12 10/19/2017    BUN 8 05/26/2017    PROT 6.7 08/16/2018    PROT 6.9 10/19/2017    PROT 6.6 07/09/2011     TSH:    Lab Results   Component Value Date    TSH 2.540 05/26/2017    TSHHS 2.760 06/14/2016       QUALITY MEASURES REVIEWED:  1.CAD:Patient is taking anti platelet agent:Yes  2. DYSLIPIDEMIA: Patient is on cholesterol lowering medication:Yes  3. Beta-Blocker therapy for CAD, if prior Myocardial Infarction:Yes  4. Atrial fibrillation & anticoagulation therapy No  5. Discussed weight management strategies. Impression:    1. Coronary artery disease involving native coronary artery of native heart without angina pectoris    2. Mixed hyperlipidemia    3. 2-vessel coronary artery disease    4.  Class 1 obesity due to excess calories with serious comorbidity and body mass index (BMI) of 33.0 to 33.9 in adult       Patient Active Problem List   Diagnosis Code    CAD (coronary artery disease) I25.10   

## 2020-11-18 RX ORDER — PRAVASTATIN SODIUM 40 MG
40 TABLET ORAL DAILY
Qty: 30 TABLET | Refills: 5 | Status: SHIPPED | OUTPATIENT
Start: 2020-11-18 | End: 2021-11-29

## 2020-11-19 ENCOUNTER — TELEPHONE (OUTPATIENT)
Dept: CARDIOLOGY CLINIC | Age: 70
End: 2020-11-19

## 2020-11-27 ENCOUNTER — OFFICE VISIT (OUTPATIENT)
Dept: CARDIOLOGY CLINIC | Age: 70
End: 2020-11-27
Payer: MEDICARE

## 2020-11-27 ENCOUNTER — TELEPHONE (OUTPATIENT)
Dept: CARDIOLOGY CLINIC | Age: 70
End: 2020-11-27

## 2020-11-27 VITALS
WEIGHT: 214.6 LBS | HEART RATE: 72 BPM | BODY MASS INDEX: 29.07 KG/M2 | SYSTOLIC BLOOD PRESSURE: 132 MMHG | HEIGHT: 72 IN | DIASTOLIC BLOOD PRESSURE: 78 MMHG

## 2020-11-27 PROCEDURE — 4040F PNEUMOC VAC/ADMIN/RCVD: CPT | Performed by: NURSE PRACTITIONER

## 2020-11-27 PROCEDURE — G8427 DOCREV CUR MEDS BY ELIG CLIN: HCPCS | Performed by: NURSE PRACTITIONER

## 2020-11-27 PROCEDURE — 99214 OFFICE O/P EST MOD 30 MIN: CPT | Performed by: NURSE PRACTITIONER

## 2020-11-27 PROCEDURE — G8417 CALC BMI ABV UP PARAM F/U: HCPCS | Performed by: NURSE PRACTITIONER

## 2020-11-27 PROCEDURE — G8484 FLU IMMUNIZE NO ADMIN: HCPCS | Performed by: NURSE PRACTITIONER

## 2020-11-27 PROCEDURE — 1036F TOBACCO NON-USER: CPT | Performed by: NURSE PRACTITIONER

## 2020-11-27 PROCEDURE — 93000 ELECTROCARDIOGRAM COMPLETE: CPT | Performed by: NURSE PRACTITIONER

## 2020-11-27 PROCEDURE — 1123F ACP DISCUSS/DSCN MKR DOCD: CPT | Performed by: NURSE PRACTITIONER

## 2020-11-27 PROCEDURE — 3017F COLORECTAL CA SCREEN DOC REV: CPT | Performed by: NURSE PRACTITIONER

## 2020-11-27 NOTE — TELEPHONE ENCOUNTER
Called pt to let him know that he had lipid blood orders that I mailed them if he didn't get them in a few weeks to call the office and get another copy.

## 2020-11-27 NOTE — LETTER
Alexx Schneider Sr.  [birth year]  [id number]    Have you had any Chest Pain that is not new? - No   every 6 months        Have you had any Shortness of Breath - No      Have you had any dizziness - No      Have you had any palpitations that are not new? - No      Is the patient on any of the following medications -     Do you have any edema - swelling in legs    If Yes - CHECK TO SEE IF THE EDEMA IS PITTING  How long have they been having edema - Years  If Yes - Have they worn compression stockings Yes    Vein \"LEG PROBLEM Questionnaire\"  1. Do you have prominent leg veins? No   2. Do you have any skin discoloration? No  3. Do you have any healed or active sores? No  4. Do you have swelling of the legs? No  5. Do you have a family history of varicose veins? No  6. Does your profession involve pro-longed        standing or heavy lifting? No  7. Have you been fighting overweight problems? No  8. Do you have restless legs? No  9. Do you have any night time cramps? No  10. Do you have any of the following in your legs:        n/a     Do you have a surgery or procedure scheduled in the near future - Yes  If Yes- DO EKG  If Yes - Who is the surgery with? Phone number of physician   Fax number of physician   Type of surgery back   GIVE THIS INFORMATION TO SEDRICK GARG    ? Ask patient if they want to sign up for [employer] if they are not already signed up    ? Check to see if we have an E-MAIL on file for the patient    ? Check medication list thoroughly!!! AND RECONCILE OUTSIDE MEDICATIONS  If dose has changed change the entire order not just the MG  BE SURE TO ASK PATIENT IF THEY NEED MEDICATION REFILLS    ?  At check out add to every patient's \"wrap up\" the following dot phrase AFTERHOURSEDUCATION and ensure we explain this to our patients

## 2020-11-27 NOTE — PROGRESS NOTES
Office visit for surgical clearance       Cristiano Banks is a 72 y.o. male with CAD, HLD, Obesity, HTN being seen today for cardiac clearance for laminectomy. HPI : Patient has history of CAD with no angina. Stress test 1/2/2020 normal myocardial perfusion. the patient's functional status is poor. He reports they cannot walk up a flight if stairs/walk at least a block. There is no history of Systolic / Diastolic CHF, last known EF is 60%. There is a history of VHD wth trace mitral regurgitation. There is not a history of a-fib and the patient is not on anticoagulation. The patient is on antiplatelet therapy. Maurisio Vega Al has the following history:     Patient Active Problem List    Diagnosis Date Noted    2-vessel coronary artery disease     Obesity     CAD (coronary artery disease)     Hyperlipidemia     AGUILAR (dyspnea on exertion)     Dizziness        Current Outpatient Medications   Medication Sig Dispense Refill    pravastatin (PRAVACHOL) 40 MG tablet Take 1 tablet by mouth daily 30 tablet 5    Acetaminophen (TYLENOL 8 HOUR PO) Take by mouth      metoprolol tartrate (LOPRESSOR) 50 MG tablet Take 1 tablet by mouth 2 times daily 180 tablet 3    metFORMIN (GLUCOPHAGE) 500 MG tablet Take 500 mg by mouth daily (with breakfast)      zolpidem (AMBIEN) 10 MG tablet Take 5 mg by mouth nightly as needed for Sleep. Mary All aspirin 81 MG tablet Take 81 mg by mouth daily.  Travoprost, BAK Free, (TRAVATAN Z) 0.004 % SOLN ophthalmic solution Place 1 drop into both eyes nightly.  traMADol (ULTRAM) 50 MG tablet Take 50 mg by mouth every 6 hours as needed for Pain. No current facility-administered medications for this visit.         Allergies   Allergen Reactions    Crestor [Rosuvastatin Calcium]     Lipitor [Atorvastatin] Other (See Comments)     Leg pain         Past Medical History:   Diagnosis Date    2-vessel coronary artery disease Mild to moderate    CAD (coronary artery disease)     Dizziness     AGUILAR (dyspnea on exertion)     H/O cardiovascular stress test 8/25/2013    thallium, normal EF59%    H/O echocardiogram 9/19/12     EF45-50% LVH, sclerotic AV and normal MV, trace TR and MR    History of cardiac catheterization 9/30/08    History of cardiovascular stress test 5/13/10, 8/8/08    The post stress myocardial perfusion images show a normal pattern of perfusion in all regions. The post stress left ventricle is normal in size. There is no scintigraphic evidence of inducible myocardial ischemia. This is a new change over previous study. No wall motion abnormalities seen. The rest ejection fraction is 49%. Global left ventricular systolic function is normal. Exercise capacity 5 METS.  History of echocardiogram 5/11/11, 4/8/10, 11/18/09, 8/7/08    normal left ventricle dimensions with preserved systolic function. LV ejection fraction is approximately 50-55%. Left ventricle wall is hypertrophic. Diastolic dysfunction is present. Normal right ventricle. Mildly enlarged left atrium. Sclerotic aortic valve and normal mitral valve. no pericardial effusion or mural thrombus. Doppler eval reveals trace tricuspid regurg. and trace mitral regurg.      History of echocardiogram 11/14/2016    EF55%-Trace MR&TR    History of nuclear stress test 11/15/2016    lexiscan-normal,EF65%    History of nuclear stress test 01/02/2020    EF 60%, Normal    Hyperlipidemia     Obesity      Past Surgical History:   Procedure Laterality Date    COLONOSCOPY  6/2010    TOTAL KNEE ARTHROPLASTY  2005    left knee     Social History     Tobacco Use    Smoking status: Never Smoker    Smokeless tobacco: Never Used    Tobacco comment: 01/24/13   Substance Use Topics    Alcohol use: Not Currently     Alcohol/week: 0.0 standard drinks     Comment: 1 cup of coffee        Review of Systems:    Constitutional: Negative for diaphoresis and fatigue  Psychological:Negative for anxiety or depression  HENT: Negative for headaches, nasal congestion, sinus pain or vertigo  Eyes: Negative for visual disturbance. Endocrine: Negative for polydipsia/polyuria  Respiratory: Negative for shortness of breath  Cardiovascular: Negative for chest pain, dyspnea on exertion, claudication, edema, irregular heartbeat, murmur, palpitations or shortness of breath  Gastrointestinal: Negative for abdominal pain or heartburn  Genito-Urinary: Negative for urinary frequency/urgency  Musculoskeletal: Negative for muscle pain, muscular weakness, negative for pain in arm and leg or swelling in foot and leg  Neurological: Negative for dizziness, headaches, memory loss, numbness/tingling, visual changes, syncope  Dermatological: Negative for rash    Objective:  /78   Pulse 72   Ht 6' (1.829 m)   Wt 214 lb 9.6 oz (97.3 kg)   BMI 29.10 kg/m²      Wt Readings from Last 3 Encounters:   11/27/20 214 lb 9.6 oz (97.3 kg)   12/31/19 206 lb 12.8 oz (93.8 kg)   07/31/19 229 lb 9.6 oz (104.1 kg)       GENERAL - Alert, oriented, pleasant, in no apparent distress. Walks with walker  EYES: No jaundice, no conjunctival pallor. SKIN: It is warm & dry. No rashes. No Echhymosis    HEENT - No clinically significant abnormalities seen. Neck - Supple. No jugular venous distention noted. No carotid bruits. Cardiovascular - Normal S1 and S2 without obvious murmur or gallop. Extremities - No cyanosis, clubbing, or significant edema. Pulmonary - No respiratory distress. No wheezes or rales. Abdomen - No masses, tenderness, or organomegaly. Musculoskeletal - No significant edema. No joint deformities. No muscle wasting. Neurologic - Cranial nerves II through XII are grossly intact. There were no gross focal neurologic abnormalities. Assessment & Plan:  EKG: SR  CAD: Stable continue with betablocker - continue with ASA.  Last stress test 1/2/2020 reviewed- normal myocardial perfusion  HTN :   Vitals:    11/27/20 1117   BP: 132/78   Pulse: 72      Controlled continue with antihypertensives   HLD: on statin. Will get lipid panel  Obesity BMI 29.10: difficult to exercise due to back condition. Patient hopes surgery will allow him to be more mobile. Recommend low cholesterol low salt diet        Revised Cardiac Risk Index:   High risk type of surgery no   H/O ischemic heart disease  (h/o MI, or a positive stress test, current complaint of chest pain considered to be secondary to myocardial ischemia,  Use of nitrate therapy or ECG with pathological Q waves; do not count prior coronary revascularization procedure unless one of the other criteria for ischemic heart disease is present) no     H/O heart failure no  H/O CVA no   H/O DM treated with insulin no  Preoperative serum creatinine >2.0 mg/dl (177 micromol/L) N/A     The calculated rate of cardiac death, nonfatal myocardial infarction, and nonfatal cardiac arrest according to the number of predictors is; No risk factors  0.4% (95% CI: 0.1-0.8)     he is considered a moderate risk for surgery. Anticoagulation can be held prior to surgery at the discretion of the surgeon. Current recommendations are to hold 24-48 hours prior to surgery. It should be resumed as soon as possible if held. Antiplatelet therapy can be held prior to surgery at the discretion of the surgeon. To be resumed as soon as possible if held.      Office f/u with in 6 months    Electronically signed by JUAQUIN Perez CNP on 11/27/2020 at 11:43 AM

## 2020-12-18 ENCOUNTER — HOSPITAL ENCOUNTER (INPATIENT)
Age: 70
LOS: 18 days | Discharge: HOME | DRG: 950 | End: 2021-01-05
Attending: PHYSICAL MEDICINE & REHABILITATION | Admitting: PHYSICAL MEDICINE & REHABILITATION
Payer: MEDICARE

## 2020-12-18 DIAGNOSIS — M48.061 SPINAL STENOSIS OF LUMBAR REGION WITH RADICULOPATHY: Primary | ICD-10-CM

## 2020-12-18 DIAGNOSIS — M54.16 SPINAL STENOSIS OF LUMBAR REGION WITH RADICULOPATHY: Primary | ICD-10-CM

## 2020-12-18 LAB
GLUCOSE BLD-MCNC: 135 MG/DL (ref 70–99)
GLUCOSE BLD-MCNC: 139 MG/DL (ref 70–99)

## 2020-12-18 PROCEDURE — 1280000000 HC REHAB R&B

## 2020-12-18 PROCEDURE — 6370000000 HC RX 637 (ALT 250 FOR IP): Performed by: PHYSICAL MEDICINE & REHABILITATION

## 2020-12-18 PROCEDURE — 82962 GLUCOSE BLOOD TEST: CPT

## 2020-12-18 PROCEDURE — 99223 1ST HOSP IP/OBS HIGH 75: CPT | Performed by: PHYSICAL MEDICINE & REHABILITATION

## 2020-12-18 RX ORDER — OXYCODONE HYDROCHLORIDE 5 MG/1
5 TABLET ORAL EVERY 4 HOURS PRN
Status: DISCONTINUED | OUTPATIENT
Start: 2020-12-18 | End: 2021-01-05 | Stop reason: HOSPADM

## 2020-12-18 RX ORDER — METOPROLOL TARTRATE 50 MG/1
50 TABLET, FILM COATED ORAL 2 TIMES DAILY
Status: DISCONTINUED | OUTPATIENT
Start: 2020-12-18 | End: 2021-01-05 | Stop reason: HOSPADM

## 2020-12-18 RX ORDER — POLYETHYLENE GLYCOL 3350 17 G/17G
17 POWDER, FOR SOLUTION ORAL DAILY PRN
Status: DISCONTINUED | OUTPATIENT
Start: 2020-12-18 | End: 2020-12-24

## 2020-12-18 RX ORDER — ASPIRIN 81 MG/1
81 TABLET, CHEWABLE ORAL DAILY
Status: DISCONTINUED | OUTPATIENT
Start: 2020-12-19 | End: 2021-01-05 | Stop reason: HOSPADM

## 2020-12-18 RX ORDER — OXYCODONE HYDROCHLORIDE 5 MG/1
10 TABLET ORAL EVERY 4 HOURS PRN
Status: DISCONTINUED | OUTPATIENT
Start: 2020-12-18 | End: 2021-01-05

## 2020-12-18 RX ORDER — DOCUSATE SODIUM 100 MG/1
100 CAPSULE, LIQUID FILLED ORAL DAILY
Status: DISCONTINUED | OUTPATIENT
Start: 2020-12-18 | End: 2021-01-05 | Stop reason: HOSPADM

## 2020-12-18 RX ORDER — NICOTINE POLACRILEX 4 MG
15 LOZENGE BUCCAL PRN
Status: DISCONTINUED | OUTPATIENT
Start: 2020-12-18 | End: 2021-01-05 | Stop reason: HOSPADM

## 2020-12-18 RX ORDER — DEXTROSE MONOHYDRATE 50 MG/ML
100 INJECTION, SOLUTION INTRAVENOUS PRN
Status: DISCONTINUED | OUTPATIENT
Start: 2020-12-18 | End: 2021-01-05 | Stop reason: HOSPADM

## 2020-12-18 RX ORDER — ACETAMINOPHEN 325 MG/1
650 TABLET ORAL EVERY 4 HOURS PRN
Status: DISCONTINUED | OUTPATIENT
Start: 2020-12-18 | End: 2020-12-18

## 2020-12-18 RX ORDER — CYCLOBENZAPRINE HCL 10 MG
10 TABLET ORAL EVERY 6 HOURS PRN
Status: DISCONTINUED | OUTPATIENT
Start: 2020-12-18 | End: 2021-01-05 | Stop reason: HOSPADM

## 2020-12-18 RX ORDER — SENNA PLUS 8.6 MG/1
1 TABLET ORAL NIGHTLY
Status: DISCONTINUED | OUTPATIENT
Start: 2020-12-18 | End: 2021-01-05 | Stop reason: HOSPADM

## 2020-12-18 RX ORDER — ACETAMINOPHEN 500 MG
1000 TABLET ORAL
Status: DISCONTINUED | OUTPATIENT
Start: 2020-12-18 | End: 2021-01-05 | Stop reason: HOSPADM

## 2020-12-18 RX ORDER — PRAVASTATIN SODIUM 40 MG
40 TABLET ORAL NIGHTLY
Status: DISCONTINUED | OUTPATIENT
Start: 2020-12-18 | End: 2021-01-05 | Stop reason: HOSPADM

## 2020-12-18 RX ORDER — DEXTROSE MONOHYDRATE 25 G/50ML
12.5 INJECTION, SOLUTION INTRAVENOUS PRN
Status: DISCONTINUED | OUTPATIENT
Start: 2020-12-18 | End: 2021-01-05 | Stop reason: HOSPADM

## 2020-12-18 RX ADMIN — METOPROLOL TARTRATE 50 MG: 50 TABLET, FILM COATED ORAL at 20:54

## 2020-12-18 RX ADMIN — METFORMIN HYDROCHLORIDE 500 MG: 500 TABLET ORAL at 17:48

## 2020-12-18 RX ADMIN — STANDARDIZED SENNA CONCENTRATE 8.6 MG: 8.6 TABLET ORAL at 20:54

## 2020-12-18 RX ADMIN — PRAVASTATIN SODIUM 40 MG: 40 TABLET ORAL at 20:55

## 2020-12-18 RX ADMIN — DOCUSATE SODIUM 100 MG: 100 CAPSULE ORAL at 17:48

## 2020-12-18 RX ADMIN — OXYCODONE HYDROCHLORIDE 10 MG: 5 TABLET ORAL at 21:55

## 2020-12-18 RX ADMIN — OXYCODONE HYDROCHLORIDE 10 MG: 5 TABLET ORAL at 17:48

## 2020-12-18 RX ADMIN — ACETAMINOPHEN 650 MG: 325 TABLET ORAL at 17:48

## 2020-12-18 ASSESSMENT — PAIN SCALES - GENERAL: PAINLEVEL_OUTOF10: 9

## 2020-12-18 ASSESSMENT — PAIN DESCRIPTION - ORIENTATION: ORIENTATION: LOWER

## 2020-12-18 ASSESSMENT — PAIN DESCRIPTION - LOCATION: LOCATION: BACK

## 2020-12-18 ASSESSMENT — PAIN DESCRIPTION - PROGRESSION: CLINICAL_PROGRESSION: NOT CHANGED

## 2020-12-18 NOTE — PLAN OF CARE
Prevention   [x] Be free from injury during hospitalization via fall prevention measures     [] Disease management and Education  Precautions   [] Weight Bearing Precautions   [] Swallowing Precautions   [x] Monitoring of Risks of Complications   [x] DVT Prophylaxis    [] Fluid/electrolyte/Nutrition Management    [] Complete education with patient/family with understanding demonstrated for          in-room safety with transfers to bed, toilet, wheelchair, shower as well as                bathroom activities and hygiene. [] Adjustment   [] Other:   Nursing interventions may include bowel/bladder training, education for medical assistive devices, medication education, O2 saturation management, energy conservation, stress management techniques, fall prevention, alarms protocol, seating and positioning, skin/wound care, pressure relief instruction,dressing changes,  infection protection, DVT prophylaxis, and/or assistance with in room safety with transfers to bed, toilet, wheelchair, shower as well as bathroom activities and hygiene. Patient/caregiver education for:   [x] Disease/sustained injury/management      [x] Medication Use   [x] Surgical intervention   [x] Safety/Precautions   [x] Body mechanics and or joint protection   [x] Health maintenance         PHYSICAL THERAPY:  Goals:                               Long term goals  Time Frame for Long term goals : In 10 days:  Long term goal 1: Pt will complete all bed mobility independently  Long term goal 2: Pt will complete sit <> stand transfers with modAx1  Long term goal 3: Pt will ambulate 20 feet with modAx2 with LRAD  Long term goal 4: Pt will propel manual w/c 150 feet independently  Long term goal 5: Pt will independently complete 3 sets of 10 reps of BLE AROM exercises in available and allowed ROM  These goals were reviewed with this patient at the time of assessment and Chioma Vivas Sr. is in agreement.      Plan of Care: Pt to be seen 5 days per week for a minimum of 60 minutes for 14 days. Current Treatment Recommendations: Strengthening, ROM, Balance Training, Transfer Training, ADL/Self-care Training, Functional Mobility Training, IADL Training, Neuromuscular Re-education, Safety Education & Training, Home Exercise Program, Endurance Training, Patient/Caregiver Education & Training, Equipment Evaluation, Education, & procurement, Gait Training, Pain Management, Positioning, Wheelchair Mobility Training, Stair training community reintegration,animal assisted therapy, and concurrent/group therapy. OCCUPATIONAL THERAPY:  Goals:             Short term goals  Time Frame for Short term goals: STG=LTG :  Long term goals  Time Frame for Long term goals : 10-14 days  Long term goal 1: Pt will perform all grooming tasks with Mod I.  Long term goal 2: Pt will perform sponge bathing with Min A and AE/DME. Long term goal 3: Pt will perform UB dressing with Mod I.  Long term goal 4: Pt will perform LB dressing with Min A and AE/DME. Long term goal 5: Pt will don/doff footwear with Mod I and AE. Long term goals 6: Pt will complete toileting with Min A and AE/DME. Long term goal 7: Pt will complete all functional transfers (toilet, tub, bed) with Min A and DME. Long term goal 8: Pt will participate in therex/theract with emphasis on static stance >3-5 min to increase standing tolerance and endurance needed for ADLs/IADLs. :    These goals were reviewed with this patient at the time of assessment and [facility]. is in agreement    Plan of Care:  Pt to be seen 5 days per week for a minimum of 60 minutes for 14 days.       Times per week: 5x/week   Plan  Times per week: 5x/week  Times per day: Daily  Plan weeks: 10-14 days  Specific instructions for Next Treatment: LB AE education  Current Treatment Recommendations: Strengthening, Wheelchair Mobility Training, Pain Management, Positioning, ROM, Gait Training, Safety Education & Training, assistance  Car Transfer  Comment: not safe to attempt at this time due to patient dependent x2 with use of stedy for sit <> stand transfers  Reason if not Attempted: Not attempted due to medical condition or safety concerns  CARE Score: 88  Discharge Goal: Partial/moderate assistance  Walk 10 Feet?   Walk 10 Feet?: No  1 Step  Reason if not Attempted: Not attempted due to medical condition or safety concerns  Picking Up Object  Comment: not safe to attempt at this time due to patient dependent x2 with use of stedy for sit <> stand transfers  Reason if not Attempted: Not attempted due to medical condition or safety concerns  CARE Score: 88  Discharge Goal: Partial/moderate assistance  Wheelchair Ability  Uses a Wheelchair and/or Scooter?: Yes  Wheel 50 Feet with Two Turns  Assistance Needed: Supervision or touching assistance  Physical Assistance Level: No physical assistance  CARE Score: 4  Discharge Goal: Independent  Wheel 150 Feet  Assistance Needed: Substantial/maximal assistance  Physical Assistance Level: 50%-74%  Comment: patient fatigued with 50 feet of w/c propulsion and required assistance to propel manual w/c remaining distance  CARE Score: 2  Discharge Goal: Independent         1 Step (Curb)  Comment: not safe to attempt at this time due to patient dependent x2 with use of stedy for sit <> stand transfers  Reason if not Attempted: Not attempted due to medical condition or safety concerns  CARE Score: 88  Discharge Goal: Partial/moderate assistance   4 Steps  Comment: not safe to attempt at this time due to patient dependent x2 with use of stedy for sit <> stand transfers  Reason if not Attempted: Not attempted due to medical condition or safety concerns  CARE Score: 88  Discharge Goal: Partial/moderate assistance   12 Steps  Comment: not safe to attempt at this time due to patient dependent x2 with use of stedy for sit <> stand transfers  Reason if not Attempted: Not attempted due to medical condition or safety concerns  CARE Score: 88  Discharge Goal: Partial/moderate assistance    OT IRF-MIREIL[place] scores and goals for initial assessment:    Eating  Assistance Needed: Independent  Comment: Per pt report  CARE Score: 6  Oral Hygiene  Assistance Needed: Setup or clean-up assistance  Comment: Pt performed oral care w/c level with setup. CARE Score: 5  Discharge Goal: [id number]  Reason if not Attempted: Not attempted due to medical condition or safety concerns(Cordero catheter)  CARE Score: 88  Discharge Goal: Partial/moderate assistance  Shower/Bathe Self  Assistance Needed: Substantial/maximal assistance, Partial/moderate assistance  Comment: Pt performed sponge bathing w/c level with Mod A to wash anterior/posterior vikki area and distal BLE 2/2 spinal precautions. Discharge Goal: Partial/moderate assistance  Upper Body Dressing  Assistance Needed: Partial/moderate assistance  Comment: Pt doffed/donned shirt with SBA, however req Max A to don TLSO, requiring Mod A overall. CARE Score: 3  Discharge Goal: Independent  Lower Body Dressing  Assistance Needed: Dependent  Comment: Don/doff pants Dep x2 with use of Lund Milner for STS. Pt unable to thread LE 2/2 sternal precautions. CARE Score: 1  Discharge Goal: Partial/moderate assistance  Putting On/Taking Off Footwear  Assistance Needed: Dependent  Comment: Dep to don/doff socks, pt demo poor knee extension to assist with lifting LE. CARE Score: 1  Discharge Goal: Independent    Activities Prior to Admit:   Homemaking Responsibilities: No(Pt's girlfriend performs IADLs)  Active : No     Occupation: Retired  Leisure & Hobbies: Martial arts         Intensity of [address] will be seen a minimum of 3 hours of therapy per day/a minimum of 5 out of 7 days per week.     [] In this rare instance due to the nature of this patient's medical involvement, this patient will be seen 15 hours per week (900 minutes within a 7 day period). Treatments may include therapeutic exercises, gait training, neuromuscular re-ed, transfer training, community reintegration, bed mobility, w/c mobility and training, self care, home mgmt, cognitive training, energy conservation,dysphagia tx, speech/language/communication therapy, group therapy, and patient/family education. In addition, dietician/nutritionist may monitor calorie count as well as intake and collaboratively work with SLP on dietary upgrades. Neuropsychology/Psychology may evaluate and provide necessary support. Group therapy as appropriate to facilitate improved endurance, STR, COORD, function, safety, transfers, awareness and insight into deficits, problem solving, memory, and social interaction and engagement. Medical issues being managed closely and that require 24 hour availability of a physician:   [] Swallowing Precautions                                     [] Weight bearing precautions   [x] Wound Care                             [x] Infection Prevention   [x] DVT Prophylaxis/assessment              [x] Monitoring for complications    [x] Fall Precautions/Prevention                         [x] Fluid/Electrolyte/Nutrition Balance   [] Voice Protection                           [x] Medication Management   [x] Respiratory                   [x] Pain Mgmt   [x] Bowel/Bladder Fx    Medical Prognosis: [] Good  [x] Fair    [] Guarded   Total expected IRF days 14                                            Physician anticipated functional outcomes:  FWW and HHC OT/PT and supervision.   Rehab Goals:   [] Return to premorbid function of_______________________________.    [] Independent   [] Mod I  [x] Supervision  [] CGA   [] Min A   [] Mod A  Level for ambulation []without assistive device  [x] with assistive device        [] Independent   [] Mod I  [x] Supervision [] CGA   [] Min A   [] Mod A  Level for transfers []without assistive device  [x] with assistive device         [] Independent   [] Mod I  [x] Supervision [] CGA   [] Min A   [] Mod A Level with ADL's []without assistive device   [x] with assistive device     ___________________     Level with cognitive skills requiring [] No assist [x] Supervision  [] Active Assist/Cues     [] Maximize level of mobility and ADL's to decrease burden on caregiver    IPOC brief synthesis of Preadmission Screen, Post-Admission Evaluation, and Therapy Evaluations: Acute inpatient rehabilitation with occupational and physical therapy 180 minutes 5 out of every 7 days. Will address basic and  advancing mobility with self-care instruction and adaptive equipment training. Caregiver education will be offered. Expected length of stay  prior to a supervised level of function for discharge home with a walker and C OT/PT is 2 weeks.     Additional recommendation:     1. Lumbar spinal stenosis with radiculopathy and gait disturbance: Patient requires daily occupational and physical therapy. We must be aggressive with pain management, DVT prophylaxis and pulmonary hygiene. We must provide bowel and bladder retraining with a voiding trial planned after several days of Flomax and general strengthening. Will work with him wearing an LSO when out of bed. Weightbearing as tolerated per his surgeon. Monitoring his wound for infection. 2. DVT prophylaxis: With recent spinal surgery he has relative contraindications to chemoprophylaxis. We will use SCDs when in bed. Weightbearing activities are pursued daily. Monitoring him for signs of thrombosis. 3. Acute urinary retention: Cordero catheter for now. I will add Flomax at night. Voiding trial after several days of bladder rest, generalized strengthening and the Flomax. 4. Uncontrolled pain: Cryotherapy, LSO and oral analgesics. I will schedule Tylenol 4 times daily and have oxycodone available as needed. Flexeril as needed. Aggressive bowel intervention while on the narcotics.   5. Uncontrolled diabetes

## 2020-12-18 NOTE — H&P
Anna Gauthier .    : 1950  [facility]  MRN: [id number]              History and physical      Admitting diagnosis: Lumbar spinal stenosis with radiculopathy ( [address])    Comorbid diagnoses impacting rehabilitation: Uncontrolled pain, generalized weakness, gait disturbance, acute urinary retention, essential hypertension, uncontrolled diabetes type 2 with peripheral neuropathy, COPD, status post left total knee arthroplasty. Chief complaint: Back and left leg pain. History of present illness: The patient is a 61-year-old right-hand-dominant male who is been struggling with spinal and lower limb pain for years. Earlier in  he had a left knee replacement surgery. His recovery has been hampered by back pain and left leg weakness. Outpatient therapies and medications failed to correct his symptoms so on 2020 Dr. Flynn Tristan performed a multilevel decompressive laminectomy at [facility].  The patient has had significant incisional pain compounding his normal low back pain which radiates into his left leg. He has had poor control of his left leg and he has not been able to empty his bladder. A voiding trial was unsuccessful and his Cordero catheter was replaced. He has had fluctuating blood pressures, poorly controlled pain and generalized weakness. He has not been able to do his own toileting or transfers or self-care and cannot return directly home. He requires inpatient rehabilitation at this time. Review of systems: Back pain radiating into his left leg. Left leg weakness, particular around the knee. Numbness and tingling in both feet. Some positional dizziness. Dyspnea on exertion. No change in vision, speech or swallowing. No bowel movement since surgery but positive flatus. The remainder of their review of systems was negative except as mentioned in the history of present illness.     Social History: The patient is unmarried living alone in a mitral valve. no pericardial effusion or mural thrombus. Doppler eval reveals trace tricuspid regurg. and trace mitral regurg.  History of echocardiogram 11/14/2016    EF55%-Trace MR&TR    History of nuclear stress test 11/15/2016    lexiscan-normal,EF65%    History of nuclear stress test 01/02/2020    EF 60%, Normal    Hyperlipidemia     Obesity         Past Surgical History:     Past Surgical History:   Procedure Laterality Date    COLONOSCOPY  6/2010    TOTAL KNEE ARTHROPLASTY  2005    left knee       Current Medications:     Current Facility-Administered Medications:     acetaminophen (TYLENOL) tablet 650 mg, 650 mg, Oral, Q4H PRN, C Cyril Fleischer, MD    polyethylene glycol (GLYCOLAX) packet 17 g, 17 g, Oral, Daily PRN, C Cyril Fleischer, MD    Family History:   Family History   Problem Relation Age of Onset    Alzheimer's Disease Mother     Alzheimer's Disease Father     Coronary Art Dis Brother     Heart Failure Brother         CABG    Coronary Art Dis Brother     Heart Failure Brother         CABG    Coronary Art Dis Brother     Heart Failure Brother         CABG       Exam:    Blood pressure 123/70, pulse 94, temperature 99.5 °F (37.5 °C), temperature source Oral, resp. rate 18, SpO2 96 %. General: Patient was seen semiupright in bed. He is alert. He has an LSO in place. Cordero catheter is in place. He is oriented x3. In no distress. HEENT: There are no signs of trauma to his head or neck. Visual fields are full. Speech is clear. His attention is appropriate. MMM. Neck supple without adenopathy or JVD. Pulmonary: His breathing is slightly labored under his girth. No rales or rhonchi. Cardiac: Regular rate and rhythm. Abdomen: Patient's abdomen was soft and nondistended. Bowel sounds were present throughout. There was no rebound, guarding or masses noted. Spinal exam: The skin overlying his upper spine is intact. His spinal incision is dressed and dry.   An LSO is in place. Upper extremities: Patient cautiously brings both hands up to meet mine. He lacks reflexes but has normal tone. Strong  strength. Fair dexterity and blunted sensation in the fingers. Lower extremities: He struggles to move his left hip and knee because it increases back and pelvic pain. No lower limb reflexes. Trace edema. Calves are soft and heels are clear. Give way with lower limb MMT on the left. Right ankle dorsiflexion MMT is 4 -/5. Sitting balance was good. Standing balance was poor. Lab Results   Component Value Date    WBC 8.5 05/26/2017    HGB 14.6 05/26/2017    HCT 46.5 05/26/2017    MCV 88.1 05/26/2017     05/26/2017     Lab Results   Component Value Date    INR 1.06 02/09/2011    PROTIME 11.6 02/09/2011     Lab Results   Component Value Date    CREATININE 0.9 10/19/2017    BUN 12 10/19/2017     10/19/2017    K 4.5 10/19/2017     10/19/2017    CO2 26 10/19/2017     Lab Results   Component Value Date    ALT 30 08/16/2018    AST 22 08/16/2018    ALKPHOS 110 08/16/2018    BILITOT 0.3 08/16/2018         Impression: 79-year-old male with spinal and limb arthritis, relatively recent left knee replacement and now significant back and leg pain following surgery for lumbar spinal stenosis with radiculopathy. He has developed acute urinary retention and has fluctuating blood pressure and blood sugar. His COPD causes dyspnea. Strengths for the patient: Independent habits prior to admission, some experience with adaptive equipment and a relatively accessible home. Limitations/barriers for the patient: His age, that he lives alone and has multiple medical comorbidities. Recommendation: Acute inpatient rehabilitation with occupational and physical therapy 180 minutes 5 out of every 7 days. Will address basic and  advancing mobility with self-care instruction and adaptive equipment training. Caregiver education will be offered.  Expected length of stay  prior to a supervised level of function for discharge home with a walker and [facility] OT/PT is 2 weeks. Additional recommendation:    1. Lumbar spinal stenosis with radiculopathy and gait disturbance: Patient requires daily occupational and physical therapy. We must be aggressive with pain management, DVT prophylaxis and pulmonary hygiene. We must provide bowel and bladder retraining with a voiding trial planned after several days of Flomax and general strengthening. Will work with him wearing an LSO when out of bed. Weightbearing as tolerated per his surgeon. Monitoring his wound for infection. 2. DVT prophylaxis: With recent spinal surgery he has relative contraindications to chemoprophylaxis. We will use SCDs when in bed. Weightbearing activities are pursued daily. Monitoring him for signs of thrombosis. 3. Acute urinary retention: Cordero catheter for now. I will add Flomax at night. Voiding trial after several days of bladder rest, generalized strengthening and the Flomax. 4. Uncontrolled pain: Cryotherapy, LSO and oral analgesics. I will schedule Tylenol 4 times daily and have oxycodone available as needed. Flexeril as needed. Aggressive bowel intervention while on the narcotics. 5. Uncontrolled diabetes with hyperglycemia: CCD diet. Blood sugars will be checked at before meals and at bedtime. Oral Metformin and sliding scale Humalog. 6. Hypertension: Lopressor for blood pressure control. Target systolic blood pressure is 120-140. Vital signs are checked at rest and with activity. 7. COPD: Aggressive pulmonary hygiene. Monitoring O2 saturations as symptoms dictate. Albuterol inhaler if needed. I personally performed a history and physical on this patient within 24 hours of admission to the rehab unit. I have reviewed the preadmission screening and concur with its findings except the [facility] should be 3.9, other neuro.    A detailed plan of care will be established by hospital day 4 and

## 2020-12-18 NOTE — CONSULTS
hypertrophic. Diastolic dysfunction is present. Normal right ventricle. Mildly enlarged left atrium. Sclerotic aortic valve and normal mitral valve. no pericardial effusion or mural thrombus. Doppler eval reveals trace tricuspid regurg. and trace mitral regurg.  History of echocardiogram 2016    EF55%-Trace MR&TR    History of nuclear stress test 11/15/2016    lexiscan-normal,EF65%    History of nuclear stress test 2020    EF 60%, Normal    Hyperlipidemia     Obesity      Past Surgical History:        Procedure Laterality Date    COLONOSCOPY  2010    TOTAL KNEE ARTHROPLASTY      left knee     Current Medications:   No current facility-administered medications for this encounter. Allergies:  Crestor [rosuvastatin calcium] and Lipitor [atorvastatin]    Social History:   TOBACCO:   reports that he has never smoked. He has never used smokeless tobacco.  ETOH:   reports previous alcohol use. DRUGS:   reports no history of drug use. Family History:       Problem Relation Age of Onset    Alzheimer's Disease Mother     Alzheimer's Disease Father     Coronary Art Dis Brother     Heart Failure Brother         CABG    Coronary Art Dis Brother     Heart Failure Brother         CABG    Coronary Art Dis Brother     Heart Failure Brother         CABG       REVIEW OF SYSTEMS:     CONSTITUTIONAL:  negative  RESPIRATORY:  negative  CARDIOVASCULAR:  negative  GASTROINTESTINAL:  negative  GENITOURINARY:  positive for decreased stream    PHYSICAL EXAM:      VITALS:  /70   Pulse 94   Temp 99.5 °F (37.5 °C) (Oral)   Resp 18   SpO2 96%      TEMPERATURE:  Current - Temp: 99.5 °F (37.5 °C);  Max - Temp  Av.5 °F (37.5 °C)  Min: 99.5 °F (37.5 °C)  Max: 99.5 °F (37.5 °C)  24HR BLOOD PRESSURE RANGE:  Systolic (42XSH), ALENA:764 , Min:123 , CAW:211   ; Diastolic (34CNK), ZZH:13, Min:70, Max:70      General appearance: alert, appears stated age, cooperative, no distress and mild distress  Head: Normocephalic, without obvious abnormality, atraumatic  Back: No CVA tenderness; back brace in place  Abdomen: Soft, non-tender, non-distended   : 16fr newell catheter in place, urine clear yellow    DATA:    WBC:    Lab Results   Component Value Date    WBC 8.5 05/26/2017     Hemoglobin/Hematocrit:    Lab Results   Component Value Date    HGB 14.6 05/26/2017    HCT 46.5 05/26/2017     BMP:    Lab Results   Component Value Date     10/19/2017    K 4.5 10/19/2017     10/19/2017    CO2 26 10/19/2017    BUN 12 10/19/2017    LABALBU 4.2 08/16/2018    CREATININE 0.9 10/19/2017    CALCIUM 9.8 10/19/2017    GFRAA >60 10/19/2017    LABGLOM >60 10/19/2017     PT/INR:    Lab Results   Component Value Date    PROTIME 11.6 02/09/2011    INR 1.06 02/09/2011       Imaging:  No results found. Assessment & Plan:      Estiven Bailon is a 72y.o. year old male admitted 12/18/2020 for rehab following laminectomy. Known to Dr. Vishnu Kahn.    1) Acute Urinary Retention: likely post-operative retention   16fr newell catheter placed 12/16/20 without difficulty per pt report   S/p laminectomy 12/14/20   Continue newell catheter   Recommend newell removal and voiding trial in 5-7 days. Pt stable from a  standpoint. Will sign off, please call with any questions. Pt to follow up in our office in 2-4 weeks with Dr. Vishnu Kahn. Patient seen and examined, chart reviewed.      Electronically signed by Francisco Javier Miller PA-C on 12/18/2020 at 1:50 PM

## 2020-12-18 NOTE — PROGRESS NOTES
This nurse and Evelyn RN completed a skin assessment upon pt arrival. Pt skin and heels are intact. Pt has incision on back with a previna wound vac and a small incision from prior hemavac covered with 4x4 guaze and tegaderm.

## 2020-12-18 NOTE — PROGRESS NOTES
Comprehensive Nutrition Assessment    Type and Reason for Visit:  Initial, Consult(oral nutrition supplements)    Nutrition Recommendations/Plan:   Continue general diet at this time   Will offer oral nutrition supplement during stay  Encourage consistent meal intake     Nutrition Assessment:  Admit with hx spinal stenosis of lumbar region s/p laminectomy. Currently on general diet, limited po data with new admit. Not wanting much for supper tonight. Provided copy of menu. Moderate nutrition risk with predicted suboptimal intake. Malnutrition Assessment:  Malnutrition Status:  No malnutrition    Context:  Acute Illness       Estimated Daily Nutrient Needs:  Energy (kcal):  1040-0098; Weight Used for Energy Requirements:  Current     Protein (g):  81-97 (1-1.2 g/kg); Weight Used for Protein Requirements:  Ideal        Fluid (ml/day):  2000; Method Used for Fluid Requirements:  1 ml/kcal      Nutrition Related Findings:  laying flat in bed, uncomfortable with movement, hx CAD      Wounds:  Surgical Incision       Current Nutrition Therapies:    DIET GENERAL; Anthropometric Measures:  · Height: 6' (182.9 cm)  · Current Body Weight: 214 lb 8.1 oz (97.3 kg)   · Admission Body Weight: 214 lb 8.1 oz (97.3 kg)    · Usual Body Weight: 206 lb (93.4 kg)(per hx past year)     · Ideal Body Weight: 178 lbs; % Ideal Body Weight 120.5 %   · BMI: 29.1  · Adjusted Body Weight:  ; No Adjustment    · BMI Categories: Overweight (BMI 25.0-29. 9)       Nutrition Diagnosis:   · Predicted inadequate energy intake related to pain as evidenced by other (comment)(limited po data, not much appetite today)      Nutrition Interventions:   Food and/or Nutrient Delivery:  Continue Current Diet, Start Oral Nutrition Supplement  Nutrition Education/Counseling:  No recommendation at this time   Coordination of Nutrition Care:  Continue to monitor while inpatient    Goals:  Patient will consume at least 75% at meals during stay       Nutrition Monitoring and Evaluation:   Behavioral-Environmental Outcomes:  None Identified   Food/Nutrient Intake Outcomes:  Diet Advancement/Tolerance, Food and Nutrient Intake, Supplement Intake  Physical Signs/Symptoms Outcomes:  Biochemical Data, Meal Time Behavior, Skin, Weight     Discharge Planning:    Continue current diet     Electronically signed by Vibha López RD, LD on 12/18/20 at 4:28 PM EST    Contact: [phone]

## 2020-12-19 LAB
GLUCOSE BLD-MCNC: 131 MG/DL (ref 70–99)
GLUCOSE BLD-MCNC: 132 MG/DL (ref 70–99)
GLUCOSE BLD-MCNC: 133 MG/DL (ref 70–99)
GLUCOSE BLD-MCNC: 158 MG/DL (ref 70–99)

## 2020-12-19 PROCEDURE — 97535 SELF CARE MNGMENT TRAINING: CPT

## 2020-12-19 PROCEDURE — 97530 THERAPEUTIC ACTIVITIES: CPT

## 2020-12-19 PROCEDURE — 1280000000 HC REHAB R&B

## 2020-12-19 PROCEDURE — 6370000000 HC RX 637 (ALT 250 FOR IP): Performed by: PHYSICAL MEDICINE & REHABILITATION

## 2020-12-19 PROCEDURE — 97542 WHEELCHAIR MNGMENT TRAINING: CPT

## 2020-12-19 PROCEDURE — 97163 PT EVAL HIGH COMPLEX 45 MIN: CPT

## 2020-12-19 PROCEDURE — 97167 OT EVAL HIGH COMPLEX 60 MIN: CPT

## 2020-12-19 PROCEDURE — 82962 GLUCOSE BLOOD TEST: CPT

## 2020-12-19 PROCEDURE — 97110 THERAPEUTIC EXERCISES: CPT

## 2020-12-19 RX ADMIN — METOPROLOL TARTRATE 50 MG: 50 TABLET, FILM COATED ORAL at 10:17

## 2020-12-19 RX ADMIN — ACETAMINOPHEN 1000 MG: 500 TABLET ORAL at 20:40

## 2020-12-19 RX ADMIN — METOPROLOL TARTRATE 50 MG: 50 TABLET, FILM COATED ORAL at 20:40

## 2020-12-19 RX ADMIN — METFORMIN HYDROCHLORIDE 500 MG: 500 TABLET ORAL at 16:34

## 2020-12-19 RX ADMIN — STANDARDIZED SENNA CONCENTRATE 8.6 MG: 8.6 TABLET ORAL at 20:40

## 2020-12-19 RX ADMIN — ACETAMINOPHEN 1000 MG: 500 TABLET ORAL at 12:42

## 2020-12-19 RX ADMIN — OXYCODONE HYDROCHLORIDE 10 MG: 5 TABLET ORAL at 16:33

## 2020-12-19 RX ADMIN — DOCUSATE SODIUM 100 MG: 100 CAPSULE ORAL at 10:17

## 2020-12-19 RX ADMIN — OXYCODONE HYDROCHLORIDE 10 MG: 5 TABLET ORAL at 07:04

## 2020-12-19 RX ADMIN — PRAVASTATIN SODIUM 40 MG: 40 TABLET ORAL at 20:40

## 2020-12-19 RX ADMIN — ASPIRIN 81 MG CHEWABLE TABLET 81 MG: 81 TABLET CHEWABLE at 10:17

## 2020-12-19 RX ADMIN — ACETAMINOPHEN 1000 MG: 500 TABLET ORAL at 17:44

## 2020-12-19 RX ADMIN — ACETAMINOPHEN 1000 MG: 500 TABLET ORAL at 10:17

## 2020-12-19 ASSESSMENT — PAIN DESCRIPTION - PAIN TYPE: TYPE: CHRONIC PAIN;SURGICAL PAIN

## 2020-12-19 ASSESSMENT — PAIN SCALES - GENERAL
PAINLEVEL_OUTOF10: 8
PAINLEVEL_OUTOF10: 3
PAINLEVEL_OUTOF10: 3

## 2020-12-19 ASSESSMENT — PAIN DESCRIPTION - LOCATION: LOCATION: BACK

## 2020-12-19 ASSESSMENT — PAIN DESCRIPTION - ORIENTATION: ORIENTATION: LOWER

## 2020-12-19 NOTE — PROGRESS NOTES
Occupational Therapy                              [facility] OCCUPATIONAL THERAPY EVALUATION    Chart Review:  Past Medical History:   Diagnosis Date    2-vessel coronary artery disease     Mild to moderate    CAD (coronary artery disease)     Dizziness     AGUILAR (dyspnea on exertion)     H/O cardiovascular stress test 8/25/2013    thallium, normal EF59%    H/O echocardiogram 9/19/12     EF45-50% LVH, sclerotic AV and normal MV, trace TR and MR    History of cardiac catheterization 9/30/08    History of cardiovascular stress test 5/13/10, 8/8/08    The post stress myocardial perfusion images show a normal pattern of perfusion in all regions. The post stress left ventricle is normal in size. There is no scintigraphic evidence of inducible myocardial ischemia. This is a new change over previous study. No wall motion abnormalities seen. The rest ejection fraction is 49%. Global left ventricular systolic function is normal. Exercise capacity 5 METS.  History of echocardiogram 5/11/11, 4/8/10, 11/18/09, 8/7/08    normal left ventricle dimensions with preserved systolic function. LV ejection fraction is approximately 50-55%. Left ventricle wall is hypertrophic. Diastolic dysfunction is present. Normal right ventricle. Mildly enlarged left atrium. Sclerotic aortic valve and normal mitral valve. no pericardial effusion or mural thrombus. Doppler eval reveals trace tricuspid regurg. and trace mitral regurg.      History of echocardiogram 11/14/2016    EF55%-Trace MR&TR    History of nuclear stress test 11/15/2016    lexiscan-normal,EF65%    History of nuclear stress test 01/02/2020    EF 60%, Normal    Hyperlipidemia     Obesity      Past Surgical History:   Procedure Laterality Date    COLONOSCOPY  6/2010    TOTAL KNEE ARTHROPLASTY  2005    left knee     Social History:  Social/Functional History  Lives With: Significant other  Type of Home: House  Home Layout: One level  Home Access: Ramped entrance  Bathroom Shower/Tub: Tub/Shower unit, Shower chair without back(Pt does not use shower chair - pt bathes in tub.)  Bathroom Toilet: Standard  Bathroom Equipment: Grab bars in shower  Home Equipment: Rolling walker, Cane(Pt uses rollator at baseline, also has access to cane.)  ADL Assistance: [address]: (Rollator)  Homemaking Responsibilities: No(Pt's girlfriend performs IADLs)  Ambulation Assistance: Independent(Mod I with use of rollator)  Transfer Assistance: Independent  Active : No  Patient's  Info: Pt's girlfriend drives  Education: Some college  Occupation: Retired  Leisure & Hobbies: Martial arts  IADL Comments: Pt manages own medications. Additional Comments: Pt sleeps on flat bed at baseline. Pt reports no falls in the past year. Restrictions:  Restrictions/Precautions  Restrictions/Precautions: Weight Bearing, Fall Risk, General Precautions  Required Braces or Orthoses?: Yes(TLSO brace)  Lower Extremity Weight Bearing Restrictions  Right Lower Extremity Weight Bearing: Weight Bearing As Tolerated  Left Lower Extremity Weight Bearing: Weight Bearing As Tolerated     Position Activity Restriction  Spinal Precautions: No Bending, No Lifting, No Twisting  Spinal Precautions: Pt unable to recall spinal precautions. Educated pt on precautions, pt verbalized understanding. Other position/activity restrictions: Wound vac on lumbar spine, newell catheter. Subjective  Subjective: Pt awake upon arrival and agreeable to evaluation.     Pain Level: 4  Pain Location: Back, Hip    Objective:  Observation/Palpation  Posture: Poor    Orientation  Overall Orientation Status: Within Normal Limits        Vision  Vision: Impaired  Vision Exceptions: Wears glasses at all times     Hearing  Hearing: Exceptions to [facility]  Hearing Exceptions: Hard of hearing/hearing concerns    ROM:      LUE AROM (degrees)  LUE AROM : Exceptions  L Shoulder Flexion 0-180: 90           RUE AROM (degrees)  RUE AROM : Exceptions  R lifting LE. CARE Score: 1  Discharge Goal: Independent      Toileting: Toileting Hygiene  Reason if not Attempted: Not attempted due to medical condition or safety concerns(Cordero catheter)  CARE Score: 88  Discharge Goal: Partial/moderate assistance      Bed Mobility:    Bed mobility  Supine to Sit: Minimal assistance(Min A to advance LE. Mod verbal cues for log rolling technique. )    Transfers:    Transfers  Stand Pivot Transfers: Dependent/Total  Sit to stand: Dependent/Total  Stand to sit: Dependent/Total  Transfer Comments: Dep x2 STS and SPT with Daiva Mode. Attempted STS from EOB with RW, however pt unable to lift from bed. Toilet Transfer  Assistance Needed: Dependent  Comment: Dep x2 toilet transfer with use of Daiva Mode. CARE Score: 1  Discharge Goal: Partial/moderate assistance    Functional Mobility:    Balance  Sitting Balance: Supervision(Pt req intermittent use of bedrail for balance during static sit at EOB)     Functional Mobility  Functional - Mobility Device: Wheelchair  Activity: (In bearden)  Assist Level: Supervision  Functional Mobility Comments: Pt completed w/c mobility 50 ft with SUP and min verbal cues for technique. Pt used BUE and BLE to propel w/c. Cognition:  Cognition  Overall Cognitive Status: WNL    Perception:  Perception  Overall Perceptual Status: WFL      Assessment:     The patient is a 72year oldmale admitted onto ARU after hospitalization for planned L3-5 laminectomy on 12/14/20 due to known history of lumbar spinal stenosis with neurogenic claudication. Pt is now WBAT and wears a TLSO brace that may be removed when eating or bathing. Post-op course complicated by pain and gait intolerance. Pt was previously living at home independently, and now requires Max A for ADLs and Total A for transfers.  Pt would benefit from skilled OT services at the acute rehab level to maximize independence with ADLs, IADLs, and functional mobility to return to least restrictive environment. I suspect pt will be able to discharge w/c level with Min A for transfers and LB ADLs. Prognosis: Good  Decision Making: High Complexity  Clinical Presentation:  Performance deficits / Impairments: Decreased functional mobility , Decreased high-level IADLs, Decreased ADL status, Decreased endurance, Decreased fine motor control, Decreased ROM, Decreased strength, Decreased balance, Decreased posture  History:  See \"Social/Functional\" sections for complete Occupational Profile. Pt's co-morbidities include L TKA, CAD, and lumbar spinal stenosis which all impact function and ability to progress through plan of care. Assistance/Modification:  Use of DME, providing verbal cues, providing physical assistance for mobility and ADL's, providing set-up of supplies during ADL's  Patient education:   ARU procotol, Role of O.T., O.T. plan of care, spinal precautions  [x]   Patient goal was established and reviewed in Rehabtracker with patient and/or family this date. Treatment Initiated:  Patient education, ADL re-training  Barriers to Improvement:  Pain, balance, endurance, strength, standing tolerance     Discharge Recommendations:  TBD pending progress  Equipment Recommendations:  TBD pending progress    Goals:  Patient Goals   Patient goals : \"To be able to stand up on my own\"  Short term goals  Time Frame for Short term goals: STG=LTG  Long term goals  Time Frame for Long term goals : 10-14 days  Long term goal 1: Pt will perform all grooming tasks with Mod I.  Long term goal 2: Pt will perform sponge bathing with Min A and AE/DME. Long term goal 3: Pt will perform UB dressing with Mod I.  Long term goal 4: Pt will perform LB dressing with Min A and AE/DME. Long term goal 5: Pt will don/doff footwear with Mod I and AE. Long term goals 6: Pt will complete toileting with Min A and AE/DME. Long term goal 7: Pt will complete all functional transfers (toilet, tub, bed) with Min A and DME.   Long term goal 8: Pt will participate in therex/theract with emphasis on static stance >3-5 min to increase standing tolerance and endurance needed for ADLs/IADLs.     Plan:    Pt will be seen at least 60 minutes per day for a minimum of 5 days per week, plus group therapy as appropriate  Current Treatment Recommendations: Strengthening, Wheelchair Mobility Training, Pain Management, Positioning, ROM, Gait Training, Safety Education & Training, Balance Training, Stair training, Patient/Caregiver Education & Training, Self-Care / ADL, Functional Mobility Training, Equipment Evaluation, Education, & procurement, Home Management Training, Endurance Training    OT Individual Minutes  Time In: 8210  Time Out: 0241  Minutes: 90                Number of Minutes/Billable Intervention      OT Evaluation 45   Therapeutic Exercise    ADL Self-care 45   Neuro Re-Ed    Therapeutic Activity    Group    Other:    TOTAL 90     Electronically signed by:    VANESA Shin, OTR/L  12/19/2020, 12:21 PM

## 2020-12-19 NOTE — PROGRESS NOTES
Physical Therapy  [facility] ARU PHYSICAL THERAPY EVALUATION    Chart Review:  Past Medical History:   Diagnosis Date    2-vessel coronary artery disease     Mild to moderate    CAD (coronary artery disease)     Dizziness     AGUILAR (dyspnea on exertion)     H/O cardiovascular stress test 8/25/2013    thallium, normal EF59%    H/O echocardiogram 9/19/12     EF45-50% LVH, sclerotic AV and normal MV, trace TR and MR    History of cardiac catheterization 9/30/08    History of cardiovascular stress test 5/13/10, 8/8/08    The post stress myocardial perfusion images show a normal pattern of perfusion in all regions. The post stress left ventricle is normal in size. There is no scintigraphic evidence of inducible myocardial ischemia. This is a new change over previous study. No wall motion abnormalities seen. The rest ejection fraction is 49%. Global left ventricular systolic function is normal. Exercise capacity 5 METS.  History of echocardiogram 5/11/11, 4/8/10, 11/18/09, 8/7/08    normal left ventricle dimensions with preserved systolic function. LV ejection fraction is approximately 50-55%. Left ventricle wall is hypertrophic. Diastolic dysfunction is present. Normal right ventricle. Mildly enlarged left atrium. Sclerotic aortic valve and normal mitral valve. no pericardial effusion or mural thrombus. Doppler eval reveals trace tricuspid regurg. and trace mitral regurg.      History of echocardiogram 11/14/2016    EF55%-Trace MR&TR    History of nuclear stress test 11/15/2016    lexiscan-normal,EF65%    History of nuclear stress test 01/02/2020    EF 60%, Normal    Hyperlipidemia     Obesity      Past Surgical History:   Procedure Laterality Date    COLONOSCOPY  6/2010    TOTAL KNEE ARTHROPLASTY  2005    left knee       Fall History (# in past 12 months):   0  Social History:  Social/Functional History  Lives With: Significant other  Type of Home: House  Home Layout: One level  Home Access: [facility] Shower/Tub: Tub/Shower unit, Shower chair without back(Pt does not use shower chair - pt bathes in tub.)  Bathroom Toilet: Standard  Bathroom Equipment: Grab bars in shower  Home Equipment: Rolling walker, Cane(Pt uses rollator at baseline, also has access to cane.)  ADL Assistance: [address]: (Rollator)  Homemaking Responsibilities: No(Pt's girlfriend performs IADLs)  Ambulation Assistance: Independent(Mod I with use of rollator)  Transfer Assistance: Independent  Active : No  Patient's  Info: Pt's girlfriend drives  Education: Some college  Occupation: Retired  Leisure & Hobbies: Martial arts  IADL Comments: Pt manages own medications. Additional Comments: Pt sleeps on flat bed at baseline. Pt reports no falls in the past year. Restrictions:  Restrictions/Precautions  Restrictions/Precautions: Weight Bearing, Fall Risk, General Precautions  Required Braces or Orthoses?: Yes  Position Activity Restriction  Spinal Precautions: No Bending, No Lifting, No Twisting  Spinal Precautions: Educated pt on precautions, pt verbalized understanding. Other position/activity restrictions: Wound vac on lumbar spine, newell catheter.          Pain Level: 5  Pain Location: Back    Objective:  Orientation  Overall Orientation Status: Within Normal Limits        Vision  Vision: Within Functional Limits  Vision Exceptions: Wears glasses at all times  Hearing  Hearing: Exceptions to [facility]  Hearing Exceptions: Hard of hearing/hearing concerns      Strength:    Strength RLE  Comment: ankle DF: 4-/5, knee extension: 2/5  Strength LLE  Comment: ankle DF: 4-/5, knee extension: 2/5             Bed Mobility:    Bed mobility  Rolling to Left: Minimal assistance(with verbal and tactile cues to use log roll technique)  Rolling to Right: Minimal assistance  Supine to Sit: Minimal assistance(with verbal and tactile cues for BUE and BLE placement throughout transfer)  Sit to Supine: Minimal assistance    Transfers:    Transfers  Sit to Stand: Dependent/Total, 2 Person Assistance(with use of stedy; with verbal and tactile cues for BUE and BLE placement on stedy)  Stand to sit: Dependent/Total, 2 Person Assistance(with use of stedy; with verbal and tactile cues for BUE and BLE placement on stedy)     Car transfers:  not safe to attempt at this time due to patient dependent x2 with use of stedy for sit <> stand transfers    Picking up object from floor:  not safe to attempt at this time due to patient dependent x2 with use of stedy for sit <> stand transfers    Ambulation:     not safe to attempt at this time due to patient dependent x2 with use of stedy for sit <> stand transfers       Curb/Stairs:    not safe to attempt at this time due to patient dependent x2 with use of stedy for sit <> stand transfers       Wheelchair:  Propulsion 1  Level: Level Tile  Method: PHOEBE DUNN  Level of Assistance: Contact guard assistance, Stand by assistance  Description/ Details: with verbal cues for directions in order to successfully navigate hallway  Distance: 50 feet + 50 feet + 50 feet  Wheelchair Activities  Wheelchair Type: Standard  Propulsion: Yes    Balance:    Balance  Posture: Fair(forward head, rounded shoulders, increased thoracic kyphosis)  Sitting - Static: Good  Sitting - Dynamic: Good  Standing - Static: Poor  Standing - Dynamic: Poor     Therapeutic Activity Training:   Therapeutic activity training was instructed today. Cues were given for safety, sequence, UE/LE placement, awareness, and balance. Activities performed today included bed mobility training, sup-sit, sit-stand, SPT. Increased time required for all task completion. Therapeutic Exercise:  Cues were given for technique, safety, recruitment, and rationale. Cues were verbal and/or tactile.   Patient completed 2 sets of 15 reps of BLE ankle pumps, seated marching, and LAQs AROM exercises in available and allowed ROM  Patient completed 2 sets of 10 reps of BLE ankle plantarflexion with yellow band  Patient completed 2 sets of 15 reps of BUE shoulder flexion AROM exercises in available and allowed ROM    Assessment:   The patient is a 72year old male admitted onto ARU after hospitalization for planned L3-5 laminectomy on 12/14/20 due to known history of lumbar spinal stenosis with neurogenic claudication. Prior to admission, pt was modified independent with functional mobility and ADLs. Examination of body systems reveals decreased strength, decreased balance, decreased aerobic capacity, and decreased independence with functional mobility. Prognosis: Good  Decision Making: High Complexity  Clinical Presentation: unpredictable characteristics  Patient education:   ARU schedule, ARU expectations for participation, plan of care  Treatment Initiated:  Functional mobility training, gait training, patient education  Barriers to Improvement:  Significant BLE weakness; dependent with sit <> stand transfers at this time  Discharge Recommendations:  TBD; likely SNF  Equipment Recommendations:  TBD    Goals:        Long term goals  Time Frame for Long term goals :  In 10 days:  Long term goal 1: Pt will complete all bed mobility independently  Long term goal 2: Pt will complete sit <> stand transfers with modAx1  Long term goal 3: Pt will ambulate 20 feet with modAx2 with LRAD  Long term goal 4: Pt will propel manual w/c 150 feet independently  Long term goal 5: Pt will independently complete 3 sets of 10 reps of BLE AROM exercises in available and allowed ROM  Plan:    REQUIRES PT FOLLOW UP: Yes  Pt will be seen at least 60 minutes per day for a minimum of 5 days per week, plus group therapy as appropriate  Plan  Current Treatment Recommendations: Strengthening, ROM, Balance Training, Transfer Training, ADL/Self-care Training, Functional Mobility Training, IADL Training, Neuromuscular Re-education, Safety Education & Training, Home Exercise Program, Endurance Training, Patient/Caregiver Education & Training, Equipment Evaluation, Education, & procurement, Gait Training, Pain Management, Positioning, Wheelchair Mobility Training, Stair training    PT Individual Minutes  Time In: 1025  Time Out: 4767  Minutes: 90        Timed Code Treatment Minutes: 70 Minutes    Number of Minutes/Billable Intervention  PT Evaluation 20   Gait Training    Therapeutic Exercise 15   Neuro Re-Ed    Therapeutic Activity 30   Wheelchair Propulsion 25   Group    Other:    TOTAL 90         Electronically signed by:      Mindy Loomis PT, DPT  License #: [id number]

## 2020-12-20 LAB
GLUCOSE BLD-MCNC: 114 MG/DL (ref 70–99)
GLUCOSE BLD-MCNC: 123 MG/DL (ref 70–99)
GLUCOSE BLD-MCNC: 126 MG/DL (ref 70–99)
GLUCOSE BLD-MCNC: 88 MG/DL (ref 70–99)

## 2020-12-20 PROCEDURE — 1280000000 HC REHAB R&B

## 2020-12-20 PROCEDURE — 94761 N-INVAS EAR/PLS OXIMETRY MLT: CPT

## 2020-12-20 PROCEDURE — 6370000000 HC RX 637 (ALT 250 FOR IP): Performed by: PHYSICAL MEDICINE & REHABILITATION

## 2020-12-20 PROCEDURE — 94150 VITAL CAPACITY TEST: CPT

## 2020-12-20 PROCEDURE — 82962 GLUCOSE BLOOD TEST: CPT

## 2020-12-20 RX ADMIN — METOPROLOL TARTRATE 50 MG: 50 TABLET, FILM COATED ORAL at 20:59

## 2020-12-20 RX ADMIN — PRAVASTATIN SODIUM 40 MG: 40 TABLET ORAL at 20:59

## 2020-12-20 RX ADMIN — CYCLOBENZAPRINE 10 MG: 10 TABLET, FILM COATED ORAL at 18:12

## 2020-12-20 RX ADMIN — STANDARDIZED SENNA CONCENTRATE 8.6 MG: 8.6 TABLET ORAL at 20:59

## 2020-12-20 RX ADMIN — ACETAMINOPHEN 1000 MG: 500 TABLET ORAL at 20:59

## 2020-12-20 RX ADMIN — METOPROLOL TARTRATE 50 MG: 50 TABLET, FILM COATED ORAL at 12:40

## 2020-12-20 RX ADMIN — ACETAMINOPHEN 1000 MG: 500 TABLET ORAL at 12:36

## 2020-12-20 RX ADMIN — DOCUSATE SODIUM 100 MG: 100 CAPSULE ORAL at 13:16

## 2020-12-20 RX ADMIN — OXYCODONE HYDROCHLORIDE 10 MG: 5 TABLET ORAL at 03:35

## 2020-12-20 RX ADMIN — ACETAMINOPHEN 1000 MG: 500 TABLET ORAL at 18:11

## 2020-12-20 RX ADMIN — ASPIRIN 81 MG CHEWABLE TABLET 81 MG: 81 TABLET CHEWABLE at 12:36

## 2020-12-20 RX ADMIN — METFORMIN HYDROCHLORIDE 500 MG: 500 TABLET ORAL at 18:12

## 2020-12-20 RX ADMIN — OXYCODONE HYDROCHLORIDE 5 MG: 5 TABLET ORAL at 13:26

## 2020-12-20 RX ADMIN — POLYETHYLENE GLYCOL (3350) 17 G: 17 POWDER, FOR SOLUTION ORAL at 13:16

## 2020-12-20 ASSESSMENT — PAIN SCALES - GENERAL
PAINLEVEL_OUTOF10: 8
PAINLEVEL_OUTOF10: 6

## 2020-12-20 ASSESSMENT — PAIN DESCRIPTION - PAIN TYPE
TYPE: CHRONIC PAIN
TYPE: CHRONIC PAIN

## 2020-12-20 ASSESSMENT — PAIN - FUNCTIONAL ASSESSMENT: PAIN_FUNCTIONAL_ASSESSMENT: PREVENTS OR INTERFERES SOME ACTIVE ACTIVITIES AND ADLS

## 2020-12-20 ASSESSMENT — PAIN DESCRIPTION - DESCRIPTORS: DESCRIPTORS: SHARP;SHOOTING

## 2020-12-20 ASSESSMENT — PAIN DESCRIPTION - ORIENTATION: ORIENTATION: LOWER

## 2020-12-20 ASSESSMENT — PAIN DESCRIPTION - PROGRESSION
CLINICAL_PROGRESSION: NOT CHANGED
CLINICAL_PROGRESSION: GRADUALLY WORSENING

## 2020-12-20 ASSESSMENT — PAIN DESCRIPTION - FREQUENCY
FREQUENCY: CONTINUOUS

## 2020-12-20 ASSESSMENT — PAIN DESCRIPTION - LOCATION: LOCATION: BACK

## 2020-12-21 LAB
GLUCOSE BLD-MCNC: 104 MG/DL (ref 70–99)
GLUCOSE BLD-MCNC: 117 MG/DL (ref 70–99)
GLUCOSE BLD-MCNC: 118 MG/DL (ref 70–99)
GLUCOSE BLD-MCNC: 159 MG/DL (ref 70–99)

## 2020-12-21 PROCEDURE — 6370000000 HC RX 637 (ALT 250 FOR IP): Performed by: PHYSICAL MEDICINE & REHABILITATION

## 2020-12-21 PROCEDURE — 99232 SBSQ HOSP IP/OBS MODERATE 35: CPT | Performed by: PHYSICAL MEDICINE & REHABILITATION

## 2020-12-21 PROCEDURE — 97530 THERAPEUTIC ACTIVITIES: CPT

## 2020-12-21 PROCEDURE — 97110 THERAPEUTIC EXERCISES: CPT

## 2020-12-21 PROCEDURE — 97535 SELF CARE MNGMENT TRAINING: CPT

## 2020-12-21 PROCEDURE — 82962 GLUCOSE BLOOD TEST: CPT

## 2020-12-21 PROCEDURE — 6370000000 HC RX 637 (ALT 250 FOR IP): Performed by: NURSE PRACTITIONER

## 2020-12-21 PROCEDURE — 97542 WHEELCHAIR MNGMENT TRAINING: CPT

## 2020-12-21 PROCEDURE — 1280000000 HC REHAB R&B

## 2020-12-21 RX ORDER — BISACODYL 10 MG
10 SUPPOSITORY, RECTAL RECTAL ONCE
Status: COMPLETED | OUTPATIENT
Start: 2020-12-21 | End: 2020-12-21

## 2020-12-21 RX ORDER — METOCLOPRAMIDE 10 MG/1
10 TABLET ORAL ONCE
Status: COMPLETED | OUTPATIENT
Start: 2020-12-21 | End: 2020-12-21

## 2020-12-21 RX ORDER — BISACODYL 10 MG
10 SUPPOSITORY, RECTAL RECTAL PRN
Status: DISCONTINUED | OUTPATIENT
Start: 2020-12-21 | End: 2021-01-05 | Stop reason: HOSPADM

## 2020-12-21 RX ADMIN — ACETAMINOPHEN 1000 MG: 500 TABLET ORAL at 21:03

## 2020-12-21 RX ADMIN — CYCLOBENZAPRINE 10 MG: 10 TABLET, FILM COATED ORAL at 18:24

## 2020-12-21 RX ADMIN — ASPIRIN 81 MG CHEWABLE TABLET 81 MG: 81 TABLET CHEWABLE at 08:43

## 2020-12-21 RX ADMIN — POLYETHYLENE GLYCOL (3350) 17 G: 17 POWDER, FOR SOLUTION ORAL at 08:43

## 2020-12-21 RX ADMIN — BISACODYL 10 MG: 10 SUPPOSITORY RECTAL at 18:28

## 2020-12-21 RX ADMIN — CYCLOBENZAPRINE 10 MG: 10 TABLET, FILM COATED ORAL at 08:43

## 2020-12-21 RX ADMIN — OXYCODONE HYDROCHLORIDE 5 MG: 5 TABLET ORAL at 06:02

## 2020-12-21 RX ADMIN — METOPROLOL TARTRATE 50 MG: 50 TABLET, FILM COATED ORAL at 08:43

## 2020-12-21 RX ADMIN — METOCLOPRAMIDE 10 MG: 10 TABLET ORAL at 18:24

## 2020-12-21 RX ADMIN — STANDARDIZED SENNA CONCENTRATE 8.6 MG: 8.6 TABLET ORAL at 21:03

## 2020-12-21 RX ADMIN — METOPROLOL TARTRATE 50 MG: 50 TABLET, FILM COATED ORAL at 21:03

## 2020-12-21 RX ADMIN — BISACODYL 10 MG: 10 SUPPOSITORY RECTAL at 18:24

## 2020-12-21 RX ADMIN — PRAVASTATIN SODIUM 40 MG: 40 TABLET ORAL at 21:03

## 2020-12-21 RX ADMIN — MAGNESIUM CITRATE 296 ML: 1.75 LIQUID ORAL at 18:28

## 2020-12-21 RX ADMIN — METFORMIN HYDROCHLORIDE 500 MG: 500 TABLET ORAL at 18:24

## 2020-12-21 RX ADMIN — ACETAMINOPHEN 1000 MG: 500 TABLET ORAL at 08:43

## 2020-12-21 RX ADMIN — OXYCODONE HYDROCHLORIDE 10 MG: 5 TABLET ORAL at 10:53

## 2020-12-21 RX ADMIN — DOCUSATE SODIUM 100 MG: 100 CAPSULE ORAL at 08:43

## 2020-12-21 RX ADMIN — ACETAMINOPHEN 1000 MG: 500 TABLET ORAL at 14:26

## 2020-12-21 RX ADMIN — ACETAMINOPHEN 1000 MG: 500 TABLET ORAL at 18:23

## 2020-12-21 ASSESSMENT — PAIN DESCRIPTION - PROGRESSION
CLINICAL_PROGRESSION: NOT CHANGED
CLINICAL_PROGRESSION: GRADUALLY WORSENING

## 2020-12-21 ASSESSMENT — PAIN SCALES - GENERAL
PAINLEVEL_OUTOF10: 5
PAINLEVEL_OUTOF10: 8
PAINLEVEL_OUTOF10: 0
PAINLEVEL_OUTOF10: 7
PAINLEVEL_OUTOF10: 8
PAINLEVEL_OUTOF10: 6

## 2020-12-21 ASSESSMENT — PAIN DESCRIPTION - DIRECTION: RADIATING_TOWARDS: RIGHT HIP

## 2020-12-21 ASSESSMENT — PAIN - FUNCTIONAL ASSESSMENT: PAIN_FUNCTIONAL_ASSESSMENT: PREVENTS OR INTERFERES SOME ACTIVE ACTIVITIES AND ADLS

## 2020-12-21 ASSESSMENT — PAIN DESCRIPTION - FREQUENCY
FREQUENCY: INTERMITTENT
FREQUENCY: CONTINUOUS

## 2020-12-21 ASSESSMENT — PAIN DESCRIPTION - PAIN TYPE: TYPE: CHRONIC PAIN

## 2020-12-21 ASSESSMENT — PAIN DESCRIPTION - ORIENTATION
ORIENTATION: LOWER
ORIENTATION: LOWER

## 2020-12-21 ASSESSMENT — PAIN DESCRIPTION - LOCATION: LOCATION: BACK

## 2020-12-21 ASSESSMENT — PAIN DESCRIPTION - ONSET: ONSET: ON-GOING

## 2020-12-21 NOTE — PROGRESS NOTES
[facility] consult for Prevena wound vac \"buzzing. \" She already placed tegaderm over edges. Pt sitting on EOB with therapy. Back brace intact. Prevena dressing compressed to back and appears to be functioning. Surgery done at [facility] on 12/14/20 per Dr. Adriana Cao. Instructed nurse to call surgeon for instructions for incisional care and Prevena length of therapy. Re consult wound care if necessary.

## 2020-12-21 NOTE — CARE COORDINATION
Case Management Admission Note      Patient:Liam Lentz  U:[id number]  Rehab Dx/Hx: Spinal stenosis, lumbar region with neurogenic claudication [M48.062]  Spinal stenosis of lumbar region with radiculopathy [M48.061, M54.16]    Chief Complaint:   Past Medical History:   Diagnosis Date    2-vessel coronary artery disease     Mild to moderate    CAD (coronary artery disease)     Dizziness     AGUILAR (dyspnea on exertion)     H/O cardiovascular stress test 8/25/2013    thallium, normal EF59%    H/O echocardiogram 9/19/12     EF45-50% LVH, sclerotic AV and normal MV, trace TR and MR    History of cardiac catheterization 9/30/08    History of cardiovascular stress test 5/13/10, 8/8/08    The post stress myocardial perfusion images show a normal pattern of perfusion in all regions. The post stress left ventricle is normal in size. There is no scintigraphic evidence of inducible myocardial ischemia. This is a new change over previous study. No wall motion abnormalities seen. The rest ejection fraction is 49%. Global left ventricular systolic function is normal. Exercise capacity 5 METS.  History of echocardiogram 5/11/11, 4/8/10, 11/18/09, 8/7/08    normal left ventricle dimensions with preserved systolic function. LV ejection fraction is approximately 50-55%. Left ventricle wall is hypertrophic. Diastolic dysfunction is present. Normal right ventricle. Mildly enlarged left atrium. Sclerotic aortic valve and normal mitral valve. no pericardial effusion or mural thrombus. Doppler eval reveals trace tricuspid regurg. and trace mitral regurg.      History of echocardiogram 11/14/2016    EF55%-Trace MR&TR    History of nuclear stress test 11/15/2016    lexiscan-normal,EF65%    History of nuclear stress test 01/02/2020    EF 60%, Normal    Hyperlipidemia     Obesity      Past Surgical History:   Procedure Laterality Date    COLONOSCOPY  6/2010    TOTAL KNEE ARTHROPLASTY  2005    left knee

## 2020-12-21 NOTE — PATIENT CARE CONFERENCE
ACUTE REHAB TEAM CONFERENCE SUMMARY   [address] [facility]    NAME: Rakesh Owens Sr.  : 1950 ADMIT DATE: 2020    Rehab Admitting Dx: Spinal stenosis, lumbar region with neurogenic claudication [M48.062]  Spinal stenosis of lumbar region with radiculopathy [A34.044, M54.16]  Patient Comorbid Conditions: Active Hospital Problems    Diagnosis Date Noted    Spinal stenosis of lumbar region with radiculopathy [M48.061, M54.16] 2020    Generalized weakness [R53.1]     Uncontrolled pain [R52]     Gait disturbance [R26.9]     Acute urinary retention [R33.8]     Essential hypertension [I10]     Uncontrolled type 2 diabetes mellitus with peripheral neuropathy (HCC) [E11.42, E11.65]     Simple chronic bronchitis (HCC) [J41.0]     Status post left knee replacement [Z96.652]      Date: 2020    CASE MANAGEMENT  Current issues/needs regarding patient and family discharge status: Patient lives in a  home, ramped entrance with friend Marerik Vizcaino. Kerry Vizcaino also drives patient to appointments. Patient has a rollator (here on unit), manual walker, and shower chair. Plan is to discharge home with friend. PHYSICAL THERAPY         Long term goals  Time Frame for Long term goals :  In 10 days:  Long term goal 1: Pt will complete all bed mobility independentlyPart met: supervision with bed features  Long term goal 2: Pt will complete sit <> stand transfers with [id number] met:max assist with standard walker   Long term goal 3: Pt will ambulate 20 feet with modAx2 with LRADNOT MET: unable  Long term goal 4: Pt will propel manual w/c 150 feet independently Part met 65'x2 with supervision  Long term goal 5: Pt will independently complete 3 sets of 10 reps of BLE AROM exercises in available and allowed ROM  ongoing  Impairments/deficits, barriers:  BLE weakness        Prognosis: Good  Decision Making: High Complexity  Clinical Presentation: unpredictable characteristics  Equipment needed at discharge: pt has 2ww      PT IRF-MIREILLE scores and goals since initial assessment:   Roll Left and Right  Assistance Needed: Partial/moderate assistance  CARE Score: 3  Discharge Goal: Independent  Sit to Lying  Assistance Needed: Partial/moderate assistance  CARE Score: 3  Discharge Goal: Independent  Lying to Sitting on Side of Bed  Assistance Needed: Partial/moderate assistance  Physical Assistance Level: Less than 25%  CARE Score: 3  Discharge Goal: Independent  Sit to Stand  Assistance Needed: Dependent  Comment: stedy  CARE Score: 1  Discharge Goal: Partial/moderate assistance  Chair/Bed-to-Chair Transfer  Assistance Needed: Dependent  Comment: steady  CARE Score: 1  Discharge Goal: Partial/moderate assistance  Toilet Transfer  Assistance Needed: Dependent  Comment: Dep x2 toilet transfer with use of [place] [employer]. CARE Score: 1  Discharge Goal: Partial/moderate assistance  Car Transfer  Comment: not safe to attempt at this time due to patient dependent x2 with use of stedy for sit <> stand transfers  Reason if not Attempted: Not attempted due to medical condition or safety concerns  CARE Score: 88  Discharge Goal: Partial/moderate assistance  Walk 10 Feet?   Walk 10 Feet?: No  1 Step  Reason if not Attempted: Not attempted due to medical condition or safety concerns  Picking Up Object  Comment: not safe to attempt at this time due to patient dependent x2 with use of stedy for sit <> stand transfers  Reason if not Attempted: Not attempted due to medical condition or safety concerns  CARE Score: 88  Discharge Goal: Partial/moderate assistance  Wheelchair Ability  Uses a Wheelchair and/or Scooter?: Yes  Wheel 50 Feet with Two Turns  Assistance Needed: Supervision or touching assistance  Physical Assistance Level: No physical assistance  CARE Score: 4  Discharge Goal: Independent  Wheel 150 Feet  Assistance Needed: Substantial/maximal assistance  Physical Assistance Level: 50%-74%  Comment: patient fatigued with 50 feet of w/c propulsion and required assistance to propel manual w/c remaining distance  CARE Score: 2  Discharge Goal: Independent        1 Step (Curb)  Comment: not safe to attempt at this time due to patient dependent x2 with use of stedy for sit <> stand transfers  Reason if not Attempted: Not attempted due to medical condition or safety concerns  CARE Score: 88  Discharge Goal: Partial/moderate assistance   4 Steps  Comment: not safe to attempt at this time due to patient dependent x2 with use of stedy for sit <> stand transfers  Reason if not Attempted: Not attempted due to medical condition or safety concerns  CARE Score: 88  Discharge Goal: Partial/moderate assistance   12 Steps  Comment: not safe to attempt at this time due to patient dependent x2 with use of stedy for sit <> stand transfers  Reason if not Attempted: Not attempted due to medical condition or safety concerns  CARE Score: 88  Discharge Goal: Partial/moderate assistance    Fall Risk: [x]  Yes  []  No    OCCUPATIONAL THERAPY   Short term goals  Time Frame for Short term goals: STG=LTG :   Long term goals  Time Frame for Long term goals : 10-14 days  Long term goal 1: Pt will perform all grooming tasks with Mod I. MET seated  Long term goal 2: Pt will perform sponge bathing with Min A and AE/DME. NMET; mod A, used Rondel Dac for stand  Long term goal 3: Pt will perform UB dressing with Mod I. NMET; min A d/t LSO  Long term goal 4: Pt will perform LB dressing with Min A and AE/DME. NMET; mod A using reacher  Long term goal 5: Pt will don/doff footwear with Mod I and AE. NMET; mod A  Long term goals 6: Pt will complete toileting with Min A and AE/DME. NMET; dep  Long term goal 7: Pt will complete all functional transfers (toilet, tub, bed) with Min A and DME. NMET; mod A using Stedy  Long term goal 8: Pt will participate in therex/theract with emphasis on static stance >3-5 min to increase standing tolerance and endurance needed for ADLs/IADLs. Ongoing                                      OT IRF-MIREILLE scores and goals since initial assessment:    Eating  Assistance Needed: Independent  Comment: Per pt report  CARE Score: 6  Oral Hygiene  Assistance Needed: Setup or clean-up assistance  Comment: Pt performed oral care w/c level with setup. CARE Score: 5  Discharge Goal: [id number]  Reason if not Attempted: Not attempted due to medical condition or safety concerns(Cordero catheter)  CARE Score: 88  Discharge Goal: Partial/moderate assistance  Shower/Bathe Self  Assistance Needed: Substantial/maximal assistance, Partial/moderate assistance  Comment: Pt performed sponge bathing w/c level with Mod A to wash anterior/posterior vikki area and distal BLE 2/2 spinal precautions. Discharge Goal: Partial/moderate assistance  Upper Body Dressing  Assistance Needed: Partial/moderate assistance  Comment: Pt doffed/donned shirt with SBA, however req Max A to don TLSO, requiring Mod A overall. CARE Score: 3  Discharge Goal: Independent  Lower Body Dressing  Assistance Needed: Dependent  Comment: Don/doff pants Dep x2 with use of [place] for STS. Pt unable to thread LE 2/2 sternal precautions. CARE Score: 1  Discharge Goal: Partial/moderate assistance  Putting On/Taking Off Footwear  Assistance Needed: Dependent  Comment: Dep to don/doff socks, pt demo poor knee extension to assist with lifting LE.   CARE Score: 1  Discharge Goal: Independent      Impairments/deficits, barriers:  Decreased balance, endurance, BLE strength  Assessment  Performance deficits / Impairments: Decreased functional mobility , Decreased high-level IADLs, Decreased ADL status, Decreased endurance, Decreased fine motor control, Decreased ROM, Decreased strength, Decreased balance, Decreased posture  Prognosis: Good  Decision Making: High Complexity  REQUIRES OT FOLLOW UP: Yes  Discharge Recommendations: Continue to assess pending progress, Home with assist PRN  Equipment needed at discharge: BSC, SC vs TTB? COGNITIVE FUNCTION/SPEECH THERAPY (AS INDICATED)  LTG            Nursing Current Medical Status:   [x] Is continent of bowel and has Newell Catheter     [] Is incontinent of bowel and bladder    [] Has had an adequate number of bowel movements   [] Urinates with no urinary retention >300ml in bladder   [] Targeting bladder protocol with newell removal   [x] Maintaining O2 SATs at 92% or greater   [x] Has pain managed while on ARU         [x] Has had no skin breakdown while on ARU   [] Has improved skin integrity via wound measurements   [] Has no signs/symptoms of infection at the wound site   [] Pressure wounds Stage/Location:    [] Arrived on unit with pressure wound  [x] Has been free from injury during hospitalization   [] Has experienced a fall during hospitalization  [] Ongoing education with patient/family with understanding demonstrated for:  [] Receives IV Fluids  [] Other:        NUTRITION  Weight: 224 lb 6.4 oz (101.8 kg) / Body mass index is 30.43 kg/m². Current diet: Dietary Nutrition Supplements: Low Calorie High Protein Supplement  DIET GENERAL; Carb Control: 5 carb choices (75 gms)/meal  Intake: Meal intake varying %, drinks some oral nutrition supplement      Medical improvements/barriers: Back precautions are being followed, sit to stand transfers improved from elevated height. Initiating standing in parallel bars today, improving leg STR, some forgetfulness ? ? Pain meds        Team goals for next treatment period/Intervention for current barriers:   [x] Pt will increase activity tolerance for daily tasks.   [x] Pt will improve bed mobility with reduced assist.  [] Pt will improve safety in fx tasks with reduced cues/assist  [x] Pt will improve transfers with reduced assist  [x] Pt will improve toileting with reduced assist  [x] Pt will improve ADL's with use of adaptive equipment with reduced assist  [x] Pt will improve pain mgmt for maximum participation in tx program  [] Pt will improve communication to get basic needs met on unit  [] Pt will improve swallowing for safe diet advancement with use of strategies  []  Plan for discharge to home. Patient Strengths: Motivated, Cooperative and Pleasant    Justification for Continued Stay  Based on my medical assessment of the patient and review of information from the interdisciplinary team as part of this weekly team conference, the patient continues to meet the following criteria for IRF level of care:   The patient requires active and ongoing intervention of multiple therapy disciplines   The patient requires and intensive rehabilitation therapy program   The patient requires continued physician supervision by a rehabilitation physician   The patient requires 24 hours rehab nursing care   The patient requires an intensive and coordinated interdisciplinary team approach to the delivery of rehabilitative care. Assessment/Plan   [x]  The patient is making good progression towards their long term goals and is actively participating in and has a reasonable expectation to continue to benefit from the intensive rehabilitation therapy program   []  The estimated discharge date has been changed from initial team conference due to:   []  The estimated discharge destination has been changed from initial team conference due to:         Ongoing tx following discharge: [x]HHC OT PT NSG   []OUTPATIENT     [] No Further Treatment     [] Family/Caregiver Training  []  Transitional Living Arrangement   [] Home Assessment (date  )     [] Family Conference   []  Therapeutic Pass       []  Other: (specify)    Estimated Discharge Date: 1/5/2021    Estimated Discharge Destination: []home alone   []home alone with assist prn  []Continuous supervision [x]Return home with spouse/family   [] Assisted living  []SNF     Team members participating in today's conference.     [x] MARY Cazares, Medical Director  [x] José Miguel Howard,   [x] Diamond Galeana, Nurse Mgr     []  Noah Dai, PT   [] Trish Tucker, OT   [x]  Heber Baron, PT  [x] Clarissa Hernandez, OT      [x] Mine Santoro, SLP     []  Felix King, RD     [x] Randell Guerra,     [x]Mine Lauren,    []  Jessica Wetzel RN    [x] Verenice Harvey RN  [] Charles Del Toro RN    [] Effie Schwab, RN        I have led this Team Conference and agree with the plan, Fior Chen MD, 12/22/2020, 1:01 PM  Goals have been updated to reflect recent status.     Team conference note transcribed this date by: Ruth Stearns MA, [place], Therapy Coordinator

## 2020-12-21 NOTE — PROGRESS NOTES
[address] [facility]  SPINE SURGERY PROGRESS NOTE    Jennifer Foster Sr.  1950    Assessment:   1. POD #10: L3-S1 laminectomy decompression    Plan of care:   Keep dressing dry and intact- call if visible drainage     Incentive spirometry, cough and deep breathe    LSO Back brace when ambulating, may place sitting at bedside  Continue spine precautions: no bending, lifting >5#, twisting, or reaching  Increase activity and ambulation, Physical and occupational therapy  Pain management, encourage oral medications prn  Diabetic diet, bowel regimen one time today of dulcolax, magnesium citrate, and reglan per Dr. Chidi Stephenson, discussed with Dr. Laurel Dougherty. May give soap suds enema/molasses enema in am if no bowel movement. Resume previous bowel regimen afterwards. Monitor urine output, bladder scan prn for anuria, oliguria, or frequency   Consult urology for post op urinary retention  No anticoagulants or NSAIDs, continue PARISA hose/foot pumps/SCDs as ordered  Hospitalist/Dr. Laurel Dougherty for medical management  Discharge Plan - Discharge to home when cleared medically by Dr Laurel Dougherty, achieved all PT goals. Patient is amendable to the plan  Discussed with Dr. Amy Garcia MD and Dr. Shannan Gustafson:  Patient states his pain is improving. He still has some incisional pain but it is under control with current pain regimen. States he also has some mild pain that comes and goes on his lateral left thigh. States preoperative pain is much improved and he feels he is moving better today. He states he stood up by himself and is moving himself in the wheelchair with his feet. States he is starting to feel stronger. Denies CP, SOB, or calf tenderness. Denies saddle paraesthesias. Denies nausea, tolerating current diet, +flatus. Cordero patent draining clear yellow urine. Plan of care reviewed with patient at bedside. Objective:  Patient awake, alert, oriented.     Compartments soft, - Rafia's, capillary refill <2s. Side:  Bilateral  4/5 strength L2-S1 left, 5/5 right  Sensation reduced to left L5/S1 nerve distributions, normal on right  2+ DP pulses  Negative babinski  Negative clonus  Dressing C/D/I. Changed dressing and removed wound vac. Staples intact, edges well approximated with no signs of infection. Vitals:  Vitals:    12/21/20 1603   BP: (!) 112/59   Pulse: 87   Resp: 17   Temp: 98.7 °F (37.1 °C)   SpO2: 97%        I/O last 3 completed shifts: In: 5 [P.O.:720]  Out: 1500 [Urine:1500]    BMP: @LABRCNTIP(NA:3,K:3,CL:3,CO2:3,BUN:3,CREATININE:3,GLU:3,CALCIUM:3)@  CBC: @LABRCNTIP(WBC:3,HEMOGLOBIN:3,HCT:3,PLT:3)@        Allergies: Allergies   Allergen Reactions    Crestor [Rosuvastatin Calcium]     Lipitor [Atorvastatin] Other (See Comments)     Leg pain      Celebrex [Celecoxib] Other (See Comments)       PMH:   Past Medical History:   Diagnosis Date    2-vessel coronary artery disease     Mild to moderate    CAD (coronary artery disease)     Dizziness     AGUILAR (dyspnea on exertion)     H/O cardiovascular stress test 8/25/2013    thallium, normal EF59%    H/O echocardiogram 9/19/12     EF45-50% LVH, sclerotic AV and normal MV, trace TR and MR    History of cardiac catheterization 9/30/08    History of cardiovascular stress test 5/13/10, 8/8/08    The post stress myocardial perfusion images show a normal pattern of perfusion in all regions. The post stress left ventricle is normal in size. There is no scintigraphic evidence of inducible myocardial ischemia. This is a new change over previous study. No wall motion abnormalities seen. The rest ejection fraction is 49%. Global left ventricular systolic function is normal. Exercise capacity 5 METS.  History of echocardiogram 5/11/11, 4/8/10, 11/18/09, 8/7/08    normal left ventricle dimensions with preserved systolic function. LV ejection fraction is approximately 50-55%. Left ventricle wall is hypertrophic. Diastolic dysfunction is present. Normal right ventricle. Mildly enlarged left atrium. Sclerotic aortic valve and normal mitral valve. no pericardial effusion or mural thrombus. Doppler eval reveals trace tricuspid regurg. and trace mitral regurg.      History of echocardiogram 11/14/2016    EF55%-Trace MR&TR    History of nuclear stress test 11/15/2016    lexiscan-normal,EF65%    History of nuclear stress test 01/02/2020    EF 60%, Normal    Hyperlipidemia     Obesity            Admitting Diagnosis: Lumbar stenosis with neurogenic claudication L3-S1      JUAQUIN Booth-CNP, 12/21/2020 5:23 PM

## 2020-12-21 NOTE — PROGRESS NOTES
Occupational Therapy  Physical Rehabilitation: OCCUPATIONAL THERAPY     [x] daily progress note       [] discharge       Patient Name:  Kiera Cm   :  1950 MRN: [id number]  Room:  [address] [place] [address] Date of Admission: 2020  Rehabilitation Diagnosis:   Spinal stenosis, lumbar region with neurogenic claudication [M48.062]  Spinal stenosis of lumbar region with radiculopathy [M48.061, M54.16]       Date 2020       Day of ARU Week:  4   Time IN/OUT 1:00pm   Individual Tx Minutes 2:00pm   Group Tx Minutes    Co-Treat Minutes    Concurrent Tx Minutes    TOTAL Tx Time Mins 60   Variance Time    Variance Time []   Refusal due to:     []   Medical hold/reason:    []   Illness   []   Off Unit for test/procedure  []   Extra time needed to complete task  []   Therapeutic need  []   Other (specify):   Restrictions Restrictions/Precautions: Weight Bearing, Fall Risk, General Precautions(B LE WBAT, back brace on when up)         Communication with other providers: [x]   OK to see per nursing:     []   Spoke with team member regarding:      Subjective observations and cognitive status: Pt up in w/c reporting fatigue in LEs and buttocks due to sitting up in chair for prolonged period of time    Pain level/location:    /10       Location: denied pain, states \"soreness\" in back    Discharge recommendations  Anticipated discharge date:  TBD  Destination: []home alone   []home alone w assist prn   [] home w/ family    [] Continuous supervision       []SNF    [] Assisted living     [] Other:   Continued therapy: []HHC OT  []OUTPATIENT  OT   [] No Further OT  Equipment needs: TBD     Toileting:   DNT, denied need and Pt still with newell cath             Bed Mobility:           [x]   Pt received out of bed   Rolling R/L:  Mod I with leg roll technique   Scooting: Mod I   Supine --> Sit:  DNT   Sit --> Supine:  CGA for safety, G technique overall     Transfers:    Sit--> Stand:   Mod A from w/c base   Stand --> Sit: Min A to EOB with cues for safety and body mechanics   Stand-Pivot: Mod A with RW w/c>bed   Other:    Assistive device required for transfer:   RW      Functional Mobility:    Assistance:  RW for trans, sit<>stands only. Sit<>stand from EOB min-mod A for safety incl education r/t hand placement and body mechanics. Device:   [x]   Rolling Walker     []   Standard Emy Bluedemian [x]   Wheelchair        []   [employer]       []   4-Wheeled Emyalejandro Mcarthurdemian         []   Cardiac Emyalejandro Mcarthurdemian       []   Other:              Additional Therapeutic activities/exercises completed this date:     [x]   ADL Training  Repetition training with LB AE incl reacher to doff socks and sock aide to don them, min A for sequencing, planning and problem solving throughout   [x]   Balance/Postural training     [x]   Bed/Transfer Training   [x]   Endurance Training   []   Neuromuscular Re-ed   []   Nu-step:  Time:        Level:         #Steps:       []   Rebounder:    []  Seated     []  Standing        []   Supine Ther Ex (reps/sets):     [x]   Seated Ther Ex (reps/sets): Use of 1# Dbs to 90* tanvir 3 ways, x10/2 elbows x15/2 with prolonged rest between sets to avoid neuromuscular fatigue     []   Standing Ther Ex (reps/sets):     []   Other:      Comments:      Patient/Caregiver Education and Training:   [x]   Adaptive Equipment Use  [x]   Bed Mobility/Transfer Technique/Safety  [x]   Energy Conservation Tips  []   Family training  [x]   Postural Awareness  []   Safety During Functional Activities  []   Reinforced Patient's Precautions   []   Progress was updated and reviewed in Rehabtracker with patient and/or family this         date.     Treatment Plan for Next Session: Cont POC incl addressing sit<>stands, trans, AE training       Assessment:        Treatment/Activity Tolerance:   [x] Tolerated treatment with no adverse effects    [] Patient limited by fatigue  [] Patient limited by pain   [] Patient limited by medical complications:    [] Adverse reaction to Tx: [] Significant change in status    Safety:       [x]  bed alarm set    []  chair alarm set    []  Pt refused alarms                []  Telesitter activated      [x]  Gait belt used during tx session      []other:       Number of Minutes/Billable Intervention  Therapeutic Exercise 15   ADL Self-care 15   Neuro Re-Ed    Therapeutic Activity 30   Group    Other:    TOTAL 60       Social History  Social/Functional History  Lives With: Significant other  Type of Home: House  Home Layout: One level  Home Access: Ramped entrance  Bathroom Shower/Tub: Tub/Shower unit, Shower chair without back(Pt does not use shower chair - pt bathes in tub.)  Bathroom Toilet: Standard  Bathroom Equipment: Grab bars in shower  Home Equipment: Rolling walker, Cane(Pt uses rollator at baseline, also has access to cane.)  ADL Assistance: [address] [place] [address] Avenue: (Rollator)  Homemaking Responsibilities: No(Pt's girlfriend performs IADLs)  Ambulation Assistance: Independent(Mod I with use of rollator)  Transfer Assistance: Independent  Active : No  Patient's  Info: Pt's girlfriend drives  Education: Some college  Occupation: Retired  Leisure & Hobbies: Martial arts  IADL Comments: Pt manages own medications. Additional Comments: Pt sleeps on flat bed at baseline. Pt reports no falls in the past year. Objective                                                                                    Goals:  (Update in navigator)  Short term goals  Time Frame for Short term goals: STG=LTG:  Long term goals  Time Frame for Long term goals : 10-14 days  Long term goal 1: Pt will perform all grooming tasks with Mod I.  Long term goal 2: Pt will perform sponge bathing with Min A and AE/DME. Long term goal 3: Pt will perform UB dressing with Mod I.  Long term goal 4: Pt will perform LB dressing with Min A and AE/DME. Long term goal 5: Pt will don/doff footwear with Mod I and AE.   Long term goals 6: Pt will complete toileting with Min A and AE/DME. Long term goal 7: Pt will complete all functional transfers (toilet, tub, bed) with Min A and DME. Long term goal 8: Pt will participate in therex/theract with emphasis on static stance >3-5 min to increase standing tolerance and endurance needed for ADLs/IADLs. :        Plan of Care                                                                              Times per week: 5 days per week for a minimum of 60 minutes/day plus group as appropriate for 60 minutes.   Treatment to include Plan  Times per week: 5x/week  Times per day: Daily  Plan weeks: 10-14 days  Specific instructions for Next Treatment: LB AE education  Current Treatment Recommendations: Strengthening, Wheelchair Mobility Training, Pain Management, Positioning, ROM, Gait Training, Safety Education & Training, Balance Training, Stair training, Patient/Caregiver Education & Training, Self-Care / ADL, Functional Mobility Training, Equipment Evaluation, Education, & procurement, Home Management Training, Endurance Training    Electronically signed by   Viri Phillips [id number]  12/21/2020, 2:08 PM

## 2020-12-21 NOTE — PROGRESS NOTES
Physical Therapy  [x] daily progress note       [] discharge       Patient Name:  Nargis Cadena   :  1950 MRN: [id number]  Room:  [address] [place], [place] [address] Date of Admission: 2020  Rehabilitation Diagnosis:   Spinal stenosis, lumbar region with neurogenic claudication [M48.062]  Spinal stenosis of lumbar region with radiculopathy [M48.061, M54.16]       Date 2020       Day of ARU Week:  4   Time IN/OUT 1046/1146   Individual Tx Minutes 60   Group Tx Minutes    Co-Treat Minutes    Concurrent Tx Minutes    TOTAL Tx Time Mins 60   Variance Time    Variance Time []   Refusal due to:     []   Medical hold/reason:    []   Illness   []   Off Unit for test/procedure  []   Extra time needed to complete task  []   Therapeutic need  []   Other (specify):   Restrictions Restrictions/Precautions  Restrictions/Precautions: Weight Bearing, Fall Risk, General Precautions(B LE WBAT, back brace on when up)  Required Braces or Orthoses?: Yes  Position Activity Restriction  Spinal Precautions: No Bending, No Lifting, No Twisting  Spinal Precautions: Educated pt on precautions, pt verbalized understanding. Other position/activity restrictions: Wound vac on lumbar spine, newell catheter. Interdisciplinary communication [x]   Cleared for therapy per nursing     [x]   RN notified about issues during session: wound vac not sealed  []   RN updated on pt performance  []   Spoke with   []   Spoke with OT  []   Spoke with MD  []   Other:    Subjective observations and cognitive status: Pt in bed, agreeable to therapy     Pain level/location:   6 /10       Location: back incisional area.  States legs do not hurt often   Discharge recommendations  Anticipated discharge date:  tbd  Destination: []home alone   []home alone with assist PRN     [] home w/ family      [] Continuous supervision  []SNF    [] Assisted living     [] Other:  Continued therapy: []HHC PT  []OUTPATIENT  PT   [] No Further PT  []SNF PT  Caregiver training recommended: []Yes  [] No   Equipment needs: tbd     Bed Mobility:           []   Pt received out of bed   Rolling R/L:  Supervision with bed rails  Scooting:  supervision  Lying --> Sit:  Supervision with HOB ~45 degrees, use of bed rails      Transfers:    Sit--> Stand:  Allowed pt 2 hands on standard walker to push up from elevated bed for CGA sit to stand x3 reps  Stand --> Sit:   CG  Chair-->Bed/Bed --> Chair: max assist with standard walker with pt able to perform stepping with min assist, but when lifted walker to reposition, required max assist    Wheelchair Propulsion:  Distance: 65'x2   Assistance:  supervision  Extremities Used:   BUE/LE  Type:    [x]  Manual        []  Electric      Additional Therapeutic activities/exercises completed this date:     []   Nu-step:  Time:        Level:         #Steps:       []   Rebounder:    []  Seated     []  Standing        []   Balance training         []   Postural training    [x]   Supine ther ex (reps/sets): educated pt in AP, quad sets, glut sets, abd/add, heel slides, SAQ 10 x1with min assist for abd/add and heel slides.  SAQ noted quad contraction very unorganized    []   Seated ther ex (reps/sets):     []   Standing ther ex (reps/sets):     []   Picking up object from floor (standing):                   []   Reacher used   []   Other:   []   Other:    Comments:      Patient/Caregiver Education and Training:   []   Role of PT  []   Education about Dx  [x]   Use of call light for assist   [x]   Wheelchair mobility/management  []   HEP provided and explained   [x]   Treatment plan reviewed  []   Home safety  []   Body mechanics  []   Positioning  [x]   Bed Mobility/Transfer technique  [x]   Gait technique/sequencing  []   Proper use of assistive device/adaptive equipment  []   Stair training/Advanced mobility safety and technique  []   Reinforced patient's precautions/mobility while maintaining precautions  []   Postural awareness  []   Family/caregiver training  []   Progress was updated and reviewed in Rehabtracker with patient and/or family this date.   []   Other:      Treatment Plan for Next Session: transfer training, gait in // bars with assist of a second person      Assessment: Pt demonstrate good improvement with transfers today, but recommend use of shawn treviño for staff until consistency is established    Treatment/Activity Tolerance:   [] Tolerated treatment with no adverse effects    [x] Patient limited by fatigue  [] Patient limited by pain   [] Patient limited by medical complications:    [] Adverse reaction to Tx:   [] Significant change in status    Barrier/s to progress/learning:   [x]   None  []   Cognition  []   Hearing deficit  []   Pre-morbid mental/psychological status   []   Motivation  []   Communication  []   Anxiety  []   Vision deficit  []   Attention  []   Other:      Safety:       []  bed alarm set    [x]  chair alarm set    []  Pt refused alarms                []  Telesitter activated      [x]  Gait belt used during tx session      []other:         Number of Minutes/Billable Intervention  Gait Training    Therapeutic Exercise 15   Neuro Re-Ed    Therapeutic Activity 25   Wheelchair Propulsion 20   Group    Other:    TOTAL 60         Social History  Social/Functional History  Lives With: Significant other  Type of Home: House  Home Layout: One level  Home Access: Ramped entrance  Bathroom Shower/Tub: Tub/Shower unit, Shower chair without back(Pt does not use shower chair - pt bathes in tub.)  Bathroom Toilet: Standard  Bathroom Equipment: Grab bars in shower  Home Equipment: Rolling walker, Cane(Pt uses rollator at baseline, also has access to cane.)  ADL Assistance: Independent  Homemaking Assistance: (Rollator)  Homemaking Responsibilities: No(Pt's girlfriend performs IADLs)  Ambulation Assistance: Independent(Mod I with use of rollator)  Transfer Assistance: Independent  Active : No  Patient's  Info: Pt's girlfriend drives  Education: Some college  Occupation: Retired  Leisure & Hobbies: Martial arts  IADL Comments: Pt manages own medications. Additional Comments: Pt sleeps on flat bed at baseline. Pt reports no falls in the past year. Objective                                                                                    Goals:  (Update in navigator)   : Long term goals  Time Frame for Long term goals : In 10 days:  Long term goal 1: Pt will complete all bed mobility independently  Long term goal 2: Pt will complete sit <> stand transfers with modAx1  Long term goal 3: Pt will ambulate 20 feet with modAx2 with LRAD  Long term goal 4: Pt will propel manual w/c 150 feet independently  Long term goal 5: Pt will independently complete 3 sets of 10 reps of BLE AROM exercises in available and allowed ROM:        Plan of Care                                                                              Times per week: 5 days per week for a minimum of 60 minutes/day plus group as appropriate for 60 minutes.   Treatment to include Current Treatment Recommendations: Strengthening, ROM, Balance Training, Transfer Training, ADL/Self-care Training, Functional Mobility Training, IADL Training, Neuromuscular Re-education, Safety Education & Training, Home Exercise Program, Endurance Training, Patient/Caregiver Education & Training, Equipment Evaluation, Education, & procurement, Gait Training, Pain Management, Positioning, Wheelchair Mobility Training, Stair training    Electronically signed by   Phyllis Ibrahim PT  12/21/2020, 11:04 AM

## 2020-12-21 NOTE — PROGRESS NOTES
Called surgeon Dr. Ramiro Almazan and talked to his PA Tamara Ohara about his pevena wound vac making a purring/rattling noise even after enforcing his wound drapes. A yellow light is lit for the battery. Tamara Ohara will call back about plan of action.

## 2020-12-21 NOTE — PROGRESS NOTES
Occupational Therapy  Physical Rehabilitation: OCCUPATIONAL THERAPY     [x] daily progress note       [] discharge       Patient Name:  Gordy Arzate.   :  1950 MRN: [id number]  Room:  [address] [place] [address] Date of Admission: 2020  Rehabilitation Diagnosis:   Spinal stenosis, lumbar region with neurogenic claudication [M48.062]  Spinal stenosis of lumbar region with radiculopathy [M48.061, M54.16]       Date 2020       Day of ARU Week:  4   Time IN//942   Individual Tx Minutes 70   Group Tx Minutes    Co-Treat Minutes    Concurrent Tx Minutes    TOTAL Tx Time Mins 70   Variance Time    Variance Time []   Refusal due to:     []   Medical hold/reason:    []   Illness   []   Off Unit for test/procedure  []   Extra time needed to complete task  []   Therapeutic need  []   Other (specify):   Restrictions Restrictions/Precautions  Restrictions/Precautions: Weight Bearing, Fall Risk, General Precautions  Required Braces or Orthoses?: Yes  Position Activity Restriction  Spinal Precautions: No Bending, No Lifting, No Twisting  Spinal Precautions: Educated pt on precautions, pt verbalized understanding. Other position/activity restrictions: Wound vac on lumbar spine, newell catheter. Communication with other providers: [x]   OK to see per nursing:     []   Spoke with team member regarding:      Subjective observations and cognitive status: Pt in sidelying on approach, agreeable to ADL session.   Wound vac sounding an alert; discussed with RN and wound care consult was placed   Pain level/location:   8 /10       Location: Lower back/R hip; received pain meds at beginning of session    Discharge recommendations  Anticipated discharge date:  TBD  Destination: []home alone   []home alone w assist prn [] home w/ family    [] Continuous supervision       []SNF            [] Assisted living     [] Other:   Continued therapy: []HHC OT  []OUTPATIENT  OT   [] No Further OT  Equipment needs: TBD   (HIT F2 to transition between stars)    Grooming:   Setup seated at sink  Oral Hygiene:  Setup seated at sink      Bathing:   Min A; pt unable to release 1 UE while in stance in [address] requiring assist to wash bottom. In seated position of Stedy pt was able to wash vikki area. Seated in W/C pt performed UB bathing. Pt declined washing distal BLEs; will use LHS next session       Dressing:      Upper Body Dressing:  Min A d/t requiring partial physical assist + cues to don LSO; supervision to [facility] Tshirt and don hospital gown. Educated pt to have family drop off several outfits from home to wear during ARU stay, pt verbalized understanding     Lower Body Dressing: Mod A; pt required assist to perform pants management down. Seated with cues using reacher, pt able to unthread BLEs; therapist unthreaded catheter. Donning clothing was not performed as only hospital gown was donned today. Footwear: SBA, with cues pt able to use reacher to [facility] socks and sock aid to don after training    Toileting:    Pt felt urge for BM but only passed gas so no episode occurred; pt required assist for pants management up/down      Toilet Transfers:    Mod A x1 using [address]  Device Used:    []   Standard Toilet         []   [facility]           [x]  Bedside Commode frame over toilet       []   Elevated Toilet          []   Other:        Bed Mobility:           []   Pt received out of bed   Supine --> Sit:  SBA, good compliance with logroll  Sit --> Supine:  CGA, cues for logroll    Transfers:    Sit--> Stand:  Min to mod A x1 using [address]  Stand --> Sit:   Min to mod A   Stand-Pivot:   Dep using [address]  Other:    Assistive device required for transfer:   [address]      Additional Therapeutic activities/exercises completed this date:     [x]   ADL Training   [x]   Balance/Postural training     [x]   Bed/Transfer Training   [x]   Endurance Training   []   Neuromuscular Re-ed   []   Nu-step:  Time:        Level: #Steps:       []   Rebounder:    []  Seated     []  Standing        []   Supine Ther Ex (reps/sets):     []   Seated Ther Ex (reps/sets):     []   Standing Ther Ex (reps/sets):     []   Other:      Comments: All intervention performed to increase pt's endurance, ax tolerance, balance, and I c ADLs/IADLs and functional transfers/mobility. Patient/Caregiver Education and Training:   [x]   Adaptive Equipment Use  [x]   Bed Mobility/Transfer Technique/Safety  []   Energy Conservation Tips  []   Family training  [x]   Postural Awareness  [x]   Safety During Functional Activities  [x]   Reinforced Patient's Precautions   []   Progress was updated and reviewed in Rehabtracker with patient and/or family this         date. Treatment Plan for Next Session: Continue OT POC, static/dynamic standing balance/tolerance, transfers without Rondel Dach? ?       Assessment:  Comparing to OT eval pt's transfers and BLE strength were significantly improved today; pt only required 1 person assist with Rondel Dach and was able to manage BLEs during W/C mobility within room       Treatment/Activity Tolerance:   [x] Tolerated treatment with no adverse effects    [] Patient limited by fatigue  [] Patient limited by pain   [] Patient limited by medical complications:    [] Adverse reaction to Tx:   [] Significant change in status    Safety:       [x]  bed alarm set    []  chair alarm set    []  Pt refused alarms                []  Telesitter activated      [x]  Gait belt used during tx session      []other:       Number of Minutes/Billable Intervention  Therapeutic Exercise    ADL Self-care 70   Neuro Re-Ed    Therapeutic Activity    Group    Other:    TOTAL 70       Social History  Social/Functional History  Lives With: Significant other  Type of Home: House  Home Layout: One level  Home Access: Ramped entrance  Bathroom Shower/Tub: Tub/Shower unit, Shower chair without back(Pt does not use shower chair - pt bathes in tub.)  Bathroom Toilet: Standard  Bathroom Equipment: Grab bars in shower  Home Equipment: Rolling walker, Cane(Pt uses rollator at baseline, also has access to cane.)  ADL Assistance: [address]: (Rollator)  Homemaking Responsibilities: No(Pt's girlfriend performs IADLs)  Ambulation Assistance: Independent(Mod I with use of rollator)  Transfer Assistance: Independent  Active : No  Patient's  Info: Pt's girlfriend drives  Education: Some college  Occupation: Retired  Leisure & Hobbies: Martial arts  IADL Comments: Pt manages own medications. Additional Comments: Pt sleeps on flat bed at baseline. Pt reports no falls in the past year. Objective                                                                                    Goals:  (Update in navigator)  Short term goals  Time Frame for Short term goals: STG=LTG:  Long term goals  Time Frame for Long term goals : 10-14 days  Long term goal 1: Pt will perform all grooming tasks with Mod I.  Long term goal 2: Pt will perform sponge bathing with Min A and AE/DME. Long term goal 3: Pt will perform UB dressing with Mod I.  Long term goal 4: Pt will perform LB dressing with Min A and AE/DME. Long term goal 5: Pt will don/doff footwear with Mod I and AE. Long term goals 6: Pt will complete toileting with Min A and AE/DME. Long term goal 7: Pt will complete all functional transfers (toilet, tub, bed) with Min A and DME. Long term goal 8: Pt will participate in therex/theract with emphasis on static stance >3-5 min to increase standing tolerance and endurance needed for ADLs/IADLs. :        Plan of Care                                                                              Times per week: 5 days per week for a minimum of 60 minutes/day plus group as appropriate for 60 minutes.   Treatment to include Plan  Times per week: 5x/week  Times per day: Daily  Plan weeks: 10-14 days  Specific instructions for Next Treatment: LB AE education  Current Treatment Recommendations: Strengthening, Wheelchair Mobility Training, Pain Management, Positioning, ROM, Gait Training, Safety Education & Training, Balance Training, Stair training, Patient/Caregiver Education & Training, Self-Care / ADL, Functional Mobility Training, Equipment Evaluation, Education, & procurement, Home Management Training, Endurance Training    Electronically signed by   Jade Arriola MS, OTR/L  License #[id number]  12/21/2020, 8:59 AM

## 2020-12-22 LAB
GLUCOSE BLD-MCNC: 115 MG/DL (ref 70–99)
GLUCOSE BLD-MCNC: 83 MG/DL (ref 70–99)
GLUCOSE BLD-MCNC: 96 MG/DL (ref 70–99)
GLUCOSE BLD-MCNC: 98 MG/DL (ref 70–99)

## 2020-12-22 PROCEDURE — 97530 THERAPEUTIC ACTIVITIES: CPT

## 2020-12-22 PROCEDURE — 6370000000 HC RX 637 (ALT 250 FOR IP): Performed by: PHYSICAL MEDICINE & REHABILITATION

## 2020-12-22 PROCEDURE — 97542 WHEELCHAIR MNGMENT TRAINING: CPT

## 2020-12-22 PROCEDURE — 99233 SBSQ HOSP IP/OBS HIGH 50: CPT | Performed by: PHYSICAL MEDICINE & REHABILITATION

## 2020-12-22 PROCEDURE — 82962 GLUCOSE BLOOD TEST: CPT

## 2020-12-22 PROCEDURE — 97110 THERAPEUTIC EXERCISES: CPT

## 2020-12-22 PROCEDURE — 97535 SELF CARE MNGMENT TRAINING: CPT

## 2020-12-22 PROCEDURE — 1280000000 HC REHAB R&B

## 2020-12-22 PROCEDURE — 94761 N-INVAS EAR/PLS OXIMETRY MLT: CPT

## 2020-12-22 RX ADMIN — PRAVASTATIN SODIUM 40 MG: 40 TABLET ORAL at 22:22

## 2020-12-22 RX ADMIN — ACETAMINOPHEN 1000 MG: 500 TABLET ORAL at 22:22

## 2020-12-22 RX ADMIN — METOPROLOL TARTRATE 50 MG: 50 TABLET, FILM COATED ORAL at 08:55

## 2020-12-22 RX ADMIN — OXYCODONE HYDROCHLORIDE 10 MG: 5 TABLET ORAL at 13:39

## 2020-12-22 RX ADMIN — ACETAMINOPHEN 1000 MG: 500 TABLET ORAL at 08:55

## 2020-12-22 RX ADMIN — METOPROLOL TARTRATE 50 MG: 50 TABLET, FILM COATED ORAL at 22:22

## 2020-12-22 RX ADMIN — CYCLOBENZAPRINE 10 MG: 10 TABLET, FILM COATED ORAL at 08:56

## 2020-12-22 RX ADMIN — STANDARDIZED SENNA CONCENTRATE 8.6 MG: 8.6 TABLET ORAL at 22:22

## 2020-12-22 RX ADMIN — ACETAMINOPHEN 1000 MG: 500 TABLET ORAL at 13:41

## 2020-12-22 RX ADMIN — CYCLOBENZAPRINE 10 MG: 10 TABLET, FILM COATED ORAL at 16:57

## 2020-12-22 RX ADMIN — OXYCODONE HYDROCHLORIDE 10 MG: 5 TABLET ORAL at 08:55

## 2020-12-22 RX ADMIN — OXYCODONE HYDROCHLORIDE 10 MG: 5 TABLET ORAL at 22:24

## 2020-12-22 RX ADMIN — ASPIRIN 81 MG CHEWABLE TABLET 81 MG: 81 TABLET CHEWABLE at 08:56

## 2020-12-22 RX ADMIN — METFORMIN HYDROCHLORIDE 500 MG: 500 TABLET ORAL at 16:56

## 2020-12-22 RX ADMIN — OXYCODONE HYDROCHLORIDE 10 MG: 5 TABLET ORAL at 00:58

## 2020-12-22 ASSESSMENT — PAIN DESCRIPTION - LOCATION
LOCATION: BACK

## 2020-12-22 ASSESSMENT — PAIN SCALES - GENERAL
PAINLEVEL_OUTOF10: 8
PAINLEVEL_OUTOF10: 6
PAINLEVEL_OUTOF10: 5
PAINLEVEL_OUTOF10: 7

## 2020-12-22 ASSESSMENT — PAIN DESCRIPTION - FREQUENCY
FREQUENCY: INTERMITTENT
FREQUENCY: CONTINUOUS
FREQUENCY: INTERMITTENT

## 2020-12-22 ASSESSMENT — PAIN DESCRIPTION - PAIN TYPE
TYPE: CHRONIC PAIN

## 2020-12-22 ASSESSMENT — PAIN DESCRIPTION - DESCRIPTORS
DESCRIPTORS: ACHING;SORE

## 2020-12-22 ASSESSMENT — PAIN DESCRIPTION - ORIENTATION
ORIENTATION: LOWER

## 2020-12-22 ASSESSMENT — PAIN DESCRIPTION - ONSET
ONSET: ON-GOING
ONSET: ON-GOING

## 2020-12-22 ASSESSMENT — PAIN DESCRIPTION - DIRECTION: RADIATING_TOWARDS: RIGHT HIP

## 2020-12-22 ASSESSMENT — PAIN - FUNCTIONAL ASSESSMENT
PAIN_FUNCTIONAL_ASSESSMENT: PREVENTS OR INTERFERES SOME ACTIVE ACTIVITIES AND ADLS
PAIN_FUNCTIONAL_ASSESSMENT: PREVENTS OR INTERFERES SOME ACTIVE ACTIVITIES AND ADLS

## 2020-12-22 NOTE — PROGRESS NOTES
Physical Therapy      [x] daily progress note       [] discharge       Patient Name:  Eva Camarena   :  1950 MRN: [id number]  Room:  [address] [place] [address] Date of Admission: 2020  Rehabilitation Diagnosis:   Spinal stenosis, lumbar region with neurogenic claudication [M48.062]  Spinal stenosis of lumbar region with radiculopathy [M48.061, M54.16]       Date 2020       Day of ARU Week:  5   Time IN/OUT 4270-8606   Individual Tx Minutes 64   TOTAL Tx Time Mins 64   Variance Time    Variance Time []   Refusal due to:     []   Medical hold/reason:    []   Illness   []   Off Unit for test/procedure  []   Extra time needed to complete task  []   Therapeutic need  []   Other (specify):   Restrictions Restrictions/Precautions  Restrictions/Precautions: Weight Bearing, Fall Risk, General Precautions(B LE WBAT, back brace on when up)  Required Braces or Orthoses?: Yes  Position Activity Restriction  Spinal Precautions: No Bending, No Lifting, No Twisting  Spinal Precautions: Educated pt on precautions, pt verbalized understanding. Other position/activity restrictions: Wound vac on lumbar spine, newell catheter. Communication with other providers: [x]   OK to see per nursing:     []   Spoke with team member regarding:      Subjective observations and cognitive status: Pt resting in bed, agreeable to session. Nsg in room for medication     Pain level/location: 4-5/10       Location: LBP, increased to 6/10 with ax, Tylenol received   Discharge recommendations  Anticipated discharge date:  tbd  Destination: []?home alone   []?home alone with assist PRN     []? home w/ family      []? Continuous supervision  []? SNF    []? Assisted living     []? Other:  Continued therapy: []?C PT  []? OUTPATIENT  PT   []? No Further PT  []? SNF PT  Caregiver training recommended: []? Yes  []?  No   Equipment needs: tbd     Bed Mobility:           []   Pt received out of bed   Rolling R/L:  Supervision  Scooting: Family training  [x]   Progress was updated and reviewed in Rehabtracker with patient and/or family this date. Treatment Plan for Next Session: transfers and SPT with AD, gait progression in // bars, standing balance with focus on upright posture, LE therx, WC mobility        Treatment/Activity Tolerance:   [x] Tolerated treatment with no adverse effects    [] Patient limited by fatigue  [x] Patient limited by pain   [] Patient limited by medical complications:    [] Adverse reaction to Tx:   [] Significant change in status    Safety:       []  bed alarm set    []  chair alarm set    []  Pt refused alarms                []  Telesitter activated      [x]  Gait belt used during tx session      [x]other: Pt left in c/o OT in gym        Number of Minutes/Billable Intervention  Gait Training    Therapeutic Exercise    Neuro Re-Ed    Therapeutic Activity 40   Wheelchair Propulsion 14   Group    Other:    TOTAL 64         Social History  Social/Functional History  Lives With: Significant other  Type of Home: House  Home Layout: One level  Home Access: Ramped entrance  Bathroom Shower/Tub: Tub/Shower unit, Shower chair without back(Pt does not use shower chair - pt bathes in tub.)  Bathroom Toilet: Standard  Bathroom Equipment: Grab bars in shower  Home Equipment: Rolling walker, Cane(Pt uses rollator at baseline, also has access to cane.)  ADL Assistance: [facility]: (Rollator)  Homemaking Responsibilities: No(Pt's girlfriend performs IADLs)  Ambulation Assistance: Independent(Mod I with use of rollator)  Transfer Assistance: Independent  Active : No  Patient's  Info: Pt's girlfriend drives  Education: Some college  Occupation: Retired  Leisure & Hobbies: Martial arts  IADL Comments: Pt manages own medications. Additional Comments: Pt sleeps on flat bed at baseline. Pt reports no falls in the past year.     Objective Goals:  (Update in navigator)   : Long term goals  Time Frame for Long term goals : In 10 days:  Long term goal 1: Pt will complete all bed mobility independently  Long term goal 2: Pt will complete sit <> stand transfers with modAx1  Long term goal 3: Pt will ambulate 20 feet with modAx2 with LRAD  Long term goal 4: Pt will propel manual w/c 150 feet independently  Long term goal 5: Pt will independently complete 3 sets of 10 reps of BLE AROM exercises in available and allowed ROM:        Plan of Care                                                                              Times per week: 5 days per week for a minimum of 60 minutes/day plus group as appropriate for 60 minutes.   Treatment to include Current Treatment Recommendations: Strengthening, ROM, Balance Training, Transfer Training, ADL/Self-care Training, Functional Mobility Training, IADL Training, Neuromuscular Re-education, Safety Education & Training, Home Exercise Program, Endurance Training, Patient/Caregiver Education & Training, Equipment Evaluation, Education, & procurement, Gait Training, Pain Management, Positioning, Wheelchair Mobility Training, Stair training    Electronically signed by   Emre Lockhart, PTA #[id number]  12/22/2020, 12:22 PM

## 2020-12-22 NOTE — PROGRESS NOTES
Occupational Therapy  Physical Rehabilitation: OCCUPATIONAL THERAPY     [x] daily progress note       [] discharge       Patient Name:  Amadeo Gaona   :  1950 MRN: [id number]  Room:  [address] [place] [address] Date of Admission: 2020  Rehabilitation Diagnosis:   Spinal stenosis, lumbar region with neurogenic claudication [M48.062]  Spinal stenosis of lumbar region with radiculopathy [M48.061, M54.16]       Date 2020       Day of ARU Week:  5   Time IN//931  1408/1507     Individual Tx Minutes 60+59   Group Tx Minutes    Co-Treat Minutes    Concurrent Tx Minutes    TOTAL Tx Time Mins 119   Variance Time    Variance Time []   Refusal due to:     []   Medical hold/reason:    []   Illness   []   Off Unit for test/procedure  []   Extra time needed to complete task  []   Therapeutic need  []   Other (specify):   Restrictions Restrictions/Precautions  Restrictions/Precautions: Weight Bearing, Fall Risk, General Precautions(B LE WBAT, back brace on when up)  Required Braces or Orthoses?: Yes  Position Activity Restriction  Spinal Precautions: No Bending, No Lifting, No Twisting  Spinal Precautions: Educated pt on precautions, pt verbalized understanding. Other position/activity restrictions: Wound vac on lumbar spine, newell catheter. Communication with other providers: [x]   OK to see per nursing:     []   Spoke with team member regarding:      Subjective observations and cognitive status: In AM: Pt pleasant, agreeable to tx session.   Since 2 OT sessions are scheduled today, pt requesting to defer most ADLs until PM when his own belongings are at ARU     In PM: Pt received from PTA in gym, reported clothes were delivered and desiring to shave and get dressed     Pain level/location:    7/10       Location: Lower back   Discharge recommendations  Anticipated discharge date:   Destination: []home alone   []home alone w assist prn [x] home w/ family    [] Continuous supervision       []SNF [] Assisted living     [] Other:   Continued therapy: [x]HHC OT  []OUTPATIENT  OT   [] No Further OT  Equipment needs: BSC, grab bars in shower? (HIT F2 to transition between stars)    Grooming:   Setup seated to shave; Ind seated to wash face and hands  Oral Hygiene:  Ind seated      Bathing:   Supervision for partial sponge bath, declined full sponge bath today      Dressing:      Upper Body Dressing:  Supervision: to [place] pull over Tshirt. After further education to always unfasten knobs on LSO to loosen after doffing, pt able to [place] and doff c supervision and  min cues for carryover    Lower Body Dressing: Mod A: Using reacher pt able to thread BLEs in shorts, therapist assisted with threading catheter and pants management up    Footwear: N/A    Toileting:   Pt attempted but only passed gas      Toilet Transfers: Mod A to<>from W/C   Device Used:    []   Standard Toilet         [x]   Grab Bars           [x]  Bedside Commode frame over toilet     []   Elevated Toilet          []   Other:        Bed Mobility:           [x]   Pt received out of bed in PM  Supine --> Sit:  Supervision  Sit --> Supine:  SBA in AM and PM    Transfers:    Sit--> Stand:  Min A from elevated bed height and W/C, cues for hand placement, in AM and PM  Stand --> Sit:   Mod A, cues for hand placement  Stand-Pivot: Mod A in AM to/from EOB<>W/C  Other:    Assistive device required for transfer:   RW      Additional Therapeutic activities/exercises completed this date:     [x]   ADL Training   [x]   Balance/Postural training     [x]   Bed/Transfer Training   [x]   Endurance Training: In AM:To increase standing endurance in prep for increased I c ADLs and mobility, pt completed static stand at tabletop x1. Pt required BUE support on countertop and was unable to trial tabletop ax at this time.         In PM: Pt stood x 1.25 min at counter with CGA while completing dynamic stand task while reaching outside base of support to facilitate Intervention  Therapeutic Exercise 45   ADL Self-care 30   Neuro Re-Ed    Therapeutic Activity 44   Group    Other:    TOTAL 119       Social History  Social/Functional History  Lives With: Significant other  Type of Home: House  Home Layout: One level  Home Access: Ramped entrance  Bathroom Shower/Tub: Tub/Shower unit, Shower chair without back(Pt does not use shower chair - pt bathes in tub.)  Bathroom Toilet: Standard  Bathroom Equipment: Grab bars in shower  Home Equipment: Rolling walker, Cane(Pt uses rollator at baseline, also has access to cane.)  ADL Assistance: [address] [facility]: (Rollator)  Homemaking Responsibilities: No(Pt's girlfriend performs IADLs)  Ambulation Assistance: Independent(Mod I with use of rollator)  Transfer Assistance: Independent  Active : No  Patient's  Info: Pt's girlfriend drives  Education: Some college  Occupation: Retired  Leisure & Hobbies: Martial arts  IADL Comments: Pt manages own medications. Additional Comments: Pt sleeps on flat bed at baseline. Pt reports no falls in the past year. Objective                                                                                    Goals:  (Update in navigator)  Short term goals  Time Frame for Short term goals: STG=LTG:  Long term goals  Time Frame for Long term goals : 10-14 days  Long term goal 1: Pt will perform all grooming tasks with Mod I.  Long term goal 2: Pt will perform sponge bathing with Min A and AE/DME. Long term goal 3: Pt will perform UB dressing with Mod I.  Long term goal 4: Pt will perform LB dressing with Min A and AE/DME. Long term goal 5: Pt will don/doff footwear with Mod I and AE. Long term goals 6: Pt will complete toileting with Min A and AE/DME. Long term goal 7: Pt will complete all functional transfers (toilet, tub, bed) with Min A and DME.   Long term goal 8: Pt will participate in therex/theract with emphasis on static stance >3-5 min to increase standing tolerance and endurance needed for ADLs/IADLs. :        Plan of Care                                                                              Times per week: 5 days per week for a minimum of 60 minutes/day plus group as appropriate for 60 minutes. Treatment to include Plan  Times per week: 5x/week  Times per day: Daily  Plan weeks: 10-14 days  Specific instructions for Next Treatment: LB AE education  Current Treatment Recommendations: Strengthening, Wheelchair Mobility Training, Pain Management, Positioning, ROM, Gait Training, Safety Education & Training, Balance Training, Stair training, Patient/Caregiver Education & Training, Self-Care / ADL, Functional Mobility Training, Equipment Evaluation, Education, & procurement, Home Management Training, Endurance Training    Electronically signed by   Jose Richey MS, OTR/L  License #[id number]  12/22/2020, 8:49 AM

## 2020-12-22 NOTE — PROGRESS NOTES
[facility] [address]   Progress Note  [facility] 0 1 2      Date: 2020   Patient: Billy Shane   : 1950   DOA: 2020   MRN: [id number]   ROOM#: 1027/1027-A     Admit Date: 2020     Collaborating Urologist on Call at time of admission: Dr. Lisseth Santo  CC: Back pain, urinary retention  Reason for Consult: Acute Urinary Retention    Subjective:     Pain: none, no nausea and no vomiting,   Bowel Movement/Flatus:   Yes  Voidinfr newell catheter in place, urine clear yellow    Pt resting comfortably in bed, states he feels well. He is ambulating well and had a BM last night. Discussed plan for neewll removal and voiding trial tomorrow morning. He is agreeable to the plan.     Objective:    Vitals:    /68 Comment: manual  Pulse 95   Temp 97.7 °F (36.5 °C) (Oral)   Resp 16   Ht 6' (1.829 m)   Wt 224 lb 6.4 oz (101.8 kg)   SpO2 98%   BMI 30.43 kg/m²    Temp  Av.4 °F (36.9 °C)  Min: 97.7 °F (36.5 °C)  Max: 98.7 °F (37.1 °C)       Intake/Output Summary (Last 24 hours) at 2020 1023  Last data filed at 2020 0581  Gross per 24 hour   Intake 840 ml   Output 1050 ml   Net -210 ml       Physical Exam:   General appearance: alert, appears stated age, cooperative, no distress and mildly obese  Head: Normocephalic, without obvious abnormality, atraumatic  Back: No CVA tenderness; back brace in place  Abdomen: Soft, non-tender, non-distended   : 16fr newell catheter in place, urine clear yellow    Labs:   WBC:    Lab Results   Component Value Date    WBC 8.5 2017      Hemoglobin/Hematocrit:    Lab Results   Component Value Date    HGB 14.6 2017    HCT 46.5 2017      BMP:   Lab Results   Component Value Date     10/19/2017    K 4.5 10/19/2017     10/19/2017    CO2 26 10/19/2017    BUN 12 10/19/2017    LABALBU 4.2 2018    CREATININE 0.9 10/19/2017    CALCIUM 9.8 10/19/2017    GFRAA >60 10/19/2017    LABGLOM >60 10/19/2017      PT/INR:    Lab Results   Component Value Date    PROTIME 11.6 02/09/2011    INR 1.06 02/09/2011      PTT:    Lab Results   Component Value Date    APTT 26.6 02/09/2011     Imaging:  No results found. Assessment & Plan:      Lucian Kimball is a 72y.o. year old male admitted 12/18/2020 for rehab following laminectomy. Known to Dr. Alexia Mario.     1) Acute Urinary Retention: likely post-operative retention              16fr newell catheter placed 12/16/20 without difficulty per pt report              S/p laminectomy 12/14/20              Continue newell catheter              Recommend newell removal and voiding trial tomorrow morning. Please reinsert newell catheter if pt unable to void in 4-6hrs and bladder scan >300cc     Pt stable from a  standpoint. Will sign off, please call with any questions. Pt to follow up in our office in 2-4 weeks with Dr. Alexia Mario. Patient seen and examined, chart reviewed.      Electronically signed by Maria Elena Ramirez PA-C on 12/22/2020 at 10:23 AM

## 2020-12-22 NOTE — PROGRESS NOTES
Roxana Gonzalez .    : 1950  Acct #: [de-identified]  MRN: [id number]              PM&R Progress Note      Admitting diagnosis: Lumbar spinal stenosis with radiculopathy (IG[facility] 3.9)     Comorbid diagnoses impacting rehabilitation: Uncontrolled pain, generalized weakness, gait disturbance, acute urinary retention, essential hypertension, uncontrolled diabetes type 2 with peripheral neuropathy, COPD, status post left total knee arthroplasty.     Chief complaint: Wanting to use the medication from home called device Vyzulta. Significant back pain. Worried about getting his Cordero out. Still no bowel movement. Prior (baseline) level of function: Independent. Current level of function:         Current  [facility] and Goals:   Hearing, Speech, and Vision  Expression of Ideas and Wants: Without difficulty  Understanding Verbal and Non-Verbal Content: Understands  Prior Functioning: Everyday Activities  Self Care: Independent  Indoor Mobility (Ambulation): Independent  Stairs: Independent  Functional Cognition: Independent  Prior Device Use: Walker    Eating  Assistance Needed: Independent  Comment: Per pt report  CARE Score: 6  Oral Hygiene  Assistance Needed: Setup or clean-up assistance  Comment: Pt performed oral care w/c level with setup. CARE Score: 5  Discharge Goal: [address]  Reason if not Attempted: Not attempted due to medical condition or safety concerns(Cordero catheter)  CARE Score: 88  Discharge Goal: Partial/moderate assistance  Shower/Bathe Self  Assistance Needed: Substantial/maximal assistance, Partial/moderate assistance  Comment: Pt performed sponge bathing w/c level with Mod A to wash anterior/posterior vikki area and distal BLE 2/2 spinal precautions. Discharge Goal: Partial/moderate assistance  Upper Body Dressing  Assistance Needed: Partial/moderate assistance  Comment: Pt doffed/donned shirt with SBA, however req Max A to don TLSO, requiring Mod A overall.   CARE Score: 3  Discharge Goal: Independent  Lower Body Dressing  Assistance Needed: Dependent  Comment: Don/doff pants Dep x2 with use of Laurelyn Ponto for STS. Pt unable to thread LE 2/2 sternal precautions. CARE Score: 1  Discharge Goal: Partial/moderate assistance  Putting On/Taking Off Footwear  Assistance Needed: Dependent  Comment: Dep to don/doff socks, pt demo poor knee extension to assist with lifting LE. CARE Score: 1  Discharge Goal: Independent    Roll Left and Right  Assistance Needed: Partial/moderate assistance  CARE Score: 3  Discharge Goal: Independent  Sit to Lying  Assistance Needed: Partial/moderate assistance  CARE Score: 3  Discharge Goal: Independent  Sit to Stand  Assistance Needed: Dependent  Comment: stedy  CARE Score: 1  Discharge Goal: Partial/moderate assistance  Chair/Bed-to-Chair Transfer  Assistance Needed: Dependent  Comment: steady  CARE Score: 1  Discharge Goal: Partial/moderate assistance  Toilet Transfer  Assistance Needed: Dependent  Comment: Dep x2 toilet transfer with use of Laurelyn Ponto. CARE Score: 1  Discharge Goal: Partial/moderate assistance  Car Transfer  Comment: not safe to attempt at this time due to patient dependent x2 with use of stedy for sit <> stand transfers  Reason if not Attempted: Not attempted due to medical condition or safety concerns  CARE Score: 88  Discharge Goal: Partial/moderate assistance   Walk 10 Feet?   Walk 10 Feet?: No  1 Step  Reason if not Attempted: Not attempted due to medical condition or safety concerns  Picking Up Object  Comment: not safe to attempt at this time due to patient dependent x2 with use of stedy for sit <> stand transfers  Reason if not Attempted: Not attempted due to medical condition or safety concerns  CARE Score: 88  Discharge Goal: Partial/moderate assistance  Wheelchair Ability  Uses a Wheelchair and/or Scooter?: Yes  Wheel 50 Feet with Two Turns  Assistance Needed: Supervision or touching assistance  Physical Assistance Level: No physical assistance  CARE Score: 4  Discharge Goal: Independent  Wheel 150 Feet  Assistance Needed: Substantial/maximal assistance  Physical Assistance Level: 50%-74%  Comment: patient fatigued with 50 feet of w/c propulsion and required assistance to propel manual w/c remaining distance  CARE Score: 2  Discharge Goal: Independent            I      Exam:    Blood pressure 116/69, pulse 85, temperature 98.4 °F (36.9 °C), resp. rate 16, height 6' (1.829 m), weight 224 lb 6.4 oz (101.8 kg), SpO2 98 %. General: Sitting in a bedside chair. Legs elevated. Uncomfortable. HEENT: MMM. Speech clear. Neck supple. Pulmonary: Unlabored without coughing. Cardiac: RRR. Abdomen: Patient's abdomen is soft and nondistended. Bowel sounds were present throughout. There was no rebound, guarding or masses noted. Upper extremities: Able to bring both hands up to meet mine. Fair  strength and coordination. Lower extremities: Very limited isolated movements in the lower limbs with pain in the back. Some dysesthesias in the left leg. No signs of DVT. Sitting balance was good. Standing balance was poor. Lab Results   Component Value Date    WBC 8.5 05/26/2017    HGB 14.6 05/26/2017    HCT 46.5 05/26/2017    MCV 88.1 05/26/2017     05/26/2017     Lab Results   Component Value Date    INR 1.06 02/09/2011    PROTIME 11.6 02/09/2011     Lab Results   Component Value Date    CREATININE 0.9 10/19/2017    BUN 12 10/19/2017     10/19/2017    K 4.5 10/19/2017     10/19/2017    CO2 26 10/19/2017     Lab Results   Component Value Date    ALT 30 08/16/2018    AST 22 08/16/2018    ALKPHOS 110 08/16/2018    BILITOT 0.3 08/16/2018       Expected length of stay  prior to a supervised level of function for discharge home with a walker and [facility] OT/PT is 1/5/2021. Recommendations:    1.  Lumbar spinal stenosis with radiculopathy and gait disturbance:  He requires significant verbal cues to engage in the daily occupational and physical therapy. He uses pain fatigue as reasons not to try activity. Ongoing aggressive pain management, DVT prophylaxis and pulmonary hygiene.      Holding off on the voiding trial to let him adjust to the Flomax. Little response to the oral bowel meds and suppository yet. May need an enema. He is always wearing an LSO when out of bed.  Weightbearing as tolerated per his surgeon. Rayshawn Kearsarge his wound for infection. Struggled to do a pivot transfer to the wheelchair today. 2. DVT prophylaxis: With recent spinal surgery he has relative contraindications to chemoprophylaxis.  SCDs when in bed.  Weightbearing activities are limited but are tried daily.    No current signs of thrombosis. 3. Acute urinary retention: Cordero catheter for now.  Seems to tolerate the Flomax at night.  Voiding trial after several days of bladder rest, generalized strengthening and the Flomax. 4. Uncontrolled pain: Cryotherapy, LSO and oral analgesics. Tolerating the scheduled Tylenol 4 times daily and have oxycodone available as needed.  Flexeril as needed. Aggressive bowel intervention while on the narcotics. 5. Uncontrolled diabetes with hyperglycemia: CCD diet.  Blood sugars will be checked at before meals and at bedtime.  Oral Metformin and sliding scale Humalog. 6. Hypertension: Lopressor for blood pressure control.  Target systolic blood pressure is 120-140.  Vital signs are checked at rest and with activity. 7. COPD: Aggressive pulmonary hygiene.  Monitoring O2 saturations as symptoms dictate.  Albuterol inhaler has not been needed.      Counseling and Coordination of Care: In care conference today I met with the patient's OT, PT, RN and . We discussed the patient's problems, progress and prognosis. Disposition issues were clarified and plans were established for ongoing rehabilitation efforts beyond the ARU stay.  I reviewed this information with the patient during a second distinct visit with the patient. More than half of the total time of 35 minutes spent with the patient involved counseling and coordination of care.

## 2020-12-22 NOTE — PROGRESS NOTES
Dmitriy Gordon .    : 1950  Acct #: [de-identified]  MRN: [id number]              PM&R Progress Note      Admitting diagnosis: Lumbar spinal stenosis with radiculopathy ([address])     Comorbid diagnoses impacting rehabilitation: Uncontrolled pain, generalized weakness, gait disturbance, acute urinary retention, essential hypertension, uncontrolled diabetes type 2 with peripheral neuropathy, COPD, status post left total knee arthroplasty.     Chief complaint: No significant bowel movement for several days. Some pain with the Cordero. Significant lower back pain. Poor sleep. Prior (baseline) level of function: Independent. Current level of function:         Current  [facility] and Goals:   Hearing, Speech, and Vision  Expression of Ideas and Wants: Without difficulty  Understanding Verbal and Non-Verbal Content: Understands  Prior Functioning: Everyday Activities  Self Care: Independent  Indoor Mobility (Ambulation): Independent  Stairs: Independent  Functional Cognition: Independent  Prior Device Use: Walker    Eating  Assistance Needed: Independent  Comment: Per pt report  CARE Score: 6  Oral Hygiene  Assistance Needed: Setup or clean-up assistance  Comment: Pt performed oral care w/c level with setup. CARE Score: 5  Discharge Goal: [address]  Reason if not Attempted: Not attempted due to medical condition or safety concerns(Cordero catheter)  CARE Score: 88  Discharge Goal: Partial/moderate assistance  Shower/Bathe Self  Assistance Needed: Substantial/maximal assistance, Partial/moderate assistance  Comment: Pt performed sponge bathing w/c level with Mod A to wash anterior/posterior vikki area and distal BLE 2/2 spinal precautions. Discharge Goal: Partial/moderate assistance  Upper Body Dressing  Assistance Needed: Partial/moderate assistance  Comment: Pt doffed/donned shirt with SBA, however req Max A to don TLSO, requiring Mod A overall.   CARE Score: 3  Discharge Goal: Independent  Lower Body Dressing  Assistance Needed: Dependent  Comment: Don/doff pants Dep x2 with use of Laurelyn Ponto for STS. Pt unable to thread LE 2/2 sternal precautions. CARE Score: 1  Discharge Goal: Partial/moderate assistance  Putting On/Taking Off Footwear  Assistance Needed: Dependent  Comment: Dep to don/doff socks, pt demo poor knee extension to assist with lifting LE. CARE Score: 1  Discharge Goal: Independent    Roll Left and Right  Assistance Needed: Partial/moderate assistance  CARE Score: 3  Discharge Goal: Independent  Sit to Lying  Assistance Needed: Partial/moderate assistance  CARE Score: 3  Discharge Goal: Independent  Sit to Stand  Assistance Needed: Dependent  Comment: stedy  CARE Score: 1  Discharge Goal: Partial/moderate assistance  Chair/Bed-to-Chair Transfer  Assistance Needed: Dependent  Comment: steady  CARE Score: 1  Discharge Goal: Partial/moderate assistance  Toilet Transfer  Assistance Needed: Dependent  Comment: Dep x2 toilet transfer with use of Laurelyn Ponto. CARE Score: 1  Discharge Goal: Partial/moderate assistance  Car Transfer  Comment: not safe to attempt at this time due to patient dependent x2 with use of stedy for sit <> stand transfers  Reason if not Attempted: Not attempted due to medical condition or safety concerns  CARE Score: 88  Discharge Goal: Partial/moderate assistance   Walk 10 Feet?   Walk 10 Feet?: No  1 Step  Reason if not Attempted: Not attempted due to medical condition or safety concerns  Picking Up Object  Comment: not safe to attempt at this time due to patient dependent x2 with use of stedy for sit <> stand transfers  Reason if not Attempted: Not attempted due to medical condition or safety concerns  CARE Score: 88  Discharge Goal: Partial/moderate assistance  Wheelchair Ability  Uses a Wheelchair and/or Scooter?: Yes  Wheel 50 Feet with Two Turns  Assistance Needed: Supervision or touching assistance  Physical Assistance Level: No physical assistance  CARE Score: 4  Discharge Goal: Independent  Wheel 150 Feet  Assistance Needed: Substantial/maximal assistance  Physical Assistance Level: 50%-74%  Comment: patient fatigued with 50 feet of w/c propulsion and required assistance to propel manual w/c remaining distance  CARE Score: 2  Discharge Goal: Independent            I      Exam:    Blood pressure 132/75, pulse 100, temperature 98.7 °F (37.1 °C), temperature source Oral, resp. rate 16, height 6' (1.829 m), weight 233 lb 6.4 oz (105.9 kg), SpO2 96 %. General: Semiupright in bed. Abdominal wrap in place. Distracted by his pains. HEENT: MMM. Neck supple. No JVD. Pulmonary: Shallow respirations without rales or rhonchi. Cardiac: Regular rate and rhythm. Abdomen: Patient's abdomen is soft and nondistended. Bowel sounds were present throughout. There was no rebound, guarding or masses noted. Upper extremities: Very slow and deliberate with arm movements. Struggles to raise his arms because of back pain. [address] coordination. Lower extremities: Limited movements at the knees and ankles. Calves are soft. Some tenderness in the left thigh to light touch. Sitting balance was fair. Standing balance was poor. Lab Results   Component Value Date    WBC 8.5 05/26/2017    HGB 14.6 05/26/2017    HCT 46.5 05/26/2017    MCV 88.1 05/26/2017     05/26/2017     Lab Results   Component Value Date    INR 1.06 02/09/2011    PROTIME 11.6 02/09/2011     Lab Results   Component Value Date    CREATININE 0.9 10/19/2017    BUN 12 10/19/2017     10/19/2017    K 4.5 10/19/2017     10/19/2017    CO2 26 10/19/2017     Lab Results   Component Value Date    ALT 30 08/16/2018    AST 22 08/16/2018    ALKPHOS 110 08/16/2018    BILITOT 0.3 08/16/2018       Expected length of stay  prior to a supervised level of function for discharge home with a walker and C OT/PT is 2 weeks. Recommendations:    1.  Lumbar spinal stenosis with radiculopathy and gait disturbance: Developing the routine for his daily occupational and physical therapy. He requires us to be aggressive with pain management, DVT prophylaxis and pulmonary hygiene. Offering him bowel and bladder retraining with a voiding trial planned after several days of Flomax and general strengthening. Increasing oral bowel meds and he may need a suppository. Will work with him wearing an LSO when out of bed. Weightbearing as tolerated per his surgeon. Monitoring his wound for infection. Struggled to sit at the edge of the bed today. 2. DVT prophylaxis: With recent spinal surgery he has relative contraindications to chemoprophylaxis. SCDs when in bed. Weightbearing activities are limited but will be tried daily. No current signs of thrombosis. 3. Acute urinary retention: Cordero catheter for now. I added Flomax at night. Voiding trial after several days of bladder rest, generalized strengthening and the Flomax. 4. Uncontrolled pain: Cryotherapy, LSO and oral analgesics. Tolerating the scheduled Tylenol 4 times daily and have oxycodone available as needed. Flexeril as needed. Aggressive bowel intervention while on the narcotics. 5. Uncontrolled diabetes with hyperglycemia: CCD diet. Blood sugars will be checked at before meals and at bedtime. Oral Metformin and sliding scale Humalog. 6. Hypertension: Lopressor for blood pressure control. Target systolic blood pressure is 120-140. Vital signs are checked at rest and with activity. 7. COPD: Aggressive pulmonary hygiene. Monitoring O2 saturations as symptoms dictate. Albuterol inhaler has not been needed.

## 2020-12-23 LAB
GLUCOSE BLD-MCNC: 101 MG/DL (ref 70–99)
GLUCOSE BLD-MCNC: 107 MG/DL (ref 70–99)
GLUCOSE BLD-MCNC: 118 MG/DL (ref 70–99)
GLUCOSE BLD-MCNC: 99 MG/DL (ref 70–99)

## 2020-12-23 PROCEDURE — 97530 THERAPEUTIC ACTIVITIES: CPT

## 2020-12-23 PROCEDURE — 94761 N-INVAS EAR/PLS OXIMETRY MLT: CPT

## 2020-12-23 PROCEDURE — 99232 SBSQ HOSP IP/OBS MODERATE 35: CPT | Performed by: PHYSICAL MEDICINE & REHABILITATION

## 2020-12-23 PROCEDURE — 1280000000 HC REHAB R&B

## 2020-12-23 PROCEDURE — 97116 GAIT TRAINING THERAPY: CPT

## 2020-12-23 PROCEDURE — 97110 THERAPEUTIC EXERCISES: CPT

## 2020-12-23 PROCEDURE — 97542 WHEELCHAIR MNGMENT TRAINING: CPT

## 2020-12-23 PROCEDURE — 6370000000 HC RX 637 (ALT 250 FOR IP): Performed by: PHYSICAL MEDICINE & REHABILITATION

## 2020-12-23 PROCEDURE — 82962 GLUCOSE BLOOD TEST: CPT

## 2020-12-23 RX ADMIN — CYCLOBENZAPRINE 10 MG: 10 TABLET, FILM COATED ORAL at 01:31

## 2020-12-23 RX ADMIN — ACETAMINOPHEN 1000 MG: 500 TABLET ORAL at 17:33

## 2020-12-23 RX ADMIN — CYCLOBENZAPRINE 10 MG: 10 TABLET, FILM COATED ORAL at 20:20

## 2020-12-23 RX ADMIN — PRAVASTATIN SODIUM 40 MG: 40 TABLET ORAL at 20:17

## 2020-12-23 RX ADMIN — DOCUSATE SODIUM 100 MG: 100 CAPSULE ORAL at 09:18

## 2020-12-23 RX ADMIN — METFORMIN HYDROCHLORIDE 500 MG: 500 TABLET ORAL at 17:33

## 2020-12-23 RX ADMIN — ACETAMINOPHEN 1000 MG: 500 TABLET ORAL at 09:18

## 2020-12-23 RX ADMIN — ASPIRIN 81 MG CHEWABLE TABLET 81 MG: 81 TABLET CHEWABLE at 09:16

## 2020-12-23 RX ADMIN — STANDARDIZED SENNA CONCENTRATE 8.6 MG: 8.6 TABLET ORAL at 20:17

## 2020-12-23 RX ADMIN — METOPROLOL TARTRATE 50 MG: 50 TABLET, FILM COATED ORAL at 09:16

## 2020-12-23 RX ADMIN — ACETAMINOPHEN 1000 MG: 500 TABLET ORAL at 11:57

## 2020-12-23 RX ADMIN — METOPROLOL TARTRATE 50 MG: 50 TABLET, FILM COATED ORAL at 20:17

## 2020-12-23 ASSESSMENT — PAIN DESCRIPTION - DIRECTION
RADIATING_TOWARDS: RT HIP
RADIATING_TOWARDS: RIGHT HIP

## 2020-12-23 ASSESSMENT — PAIN SCALES - GENERAL
PAINLEVEL_OUTOF10: 1
PAINLEVEL_OUTOF10: 0
PAINLEVEL_OUTOF10: 6
PAINLEVEL_OUTOF10: 8

## 2020-12-23 ASSESSMENT — PAIN DESCRIPTION - DESCRIPTORS
DESCRIPTORS: ACHING;SORE
DESCRIPTORS: ACHING

## 2020-12-23 ASSESSMENT — PAIN DESCRIPTION - ORIENTATION: ORIENTATION: LOWER

## 2020-12-23 ASSESSMENT — PAIN DESCRIPTION - FREQUENCY: FREQUENCY: INTERMITTENT

## 2020-12-23 ASSESSMENT — PAIN DESCRIPTION - PAIN TYPE: TYPE: CHRONIC PAIN

## 2020-12-23 ASSESSMENT — PAIN DESCRIPTION - ONSET
ONSET: ON-GOING
ONSET: ON-GOING

## 2020-12-23 NOTE — PROGRESS NOTES
Comprehensive Nutrition Assessment    Type and Reason for Visit:  Reassess    Nutrition Recommendations/Plan:   Continue carb controlled diet at this time with oral nutrition supplement  Assist with meals as needed     Nutrition Assessment:  Improving meal intake during stay, recent meals %. Started on carb controlled diet and metformin. Receiving low calorie, high protein supplement. Will drink supplement during stay. Low nutrition risk at this time with improved, adequate intake. Malnutrition Assessment:  Malnutrition Status:  No malnutrition    Context:  Acute Illness       Estimated Daily Nutrient Needs:  Energy (kcal):  3753-1473; Weight Used for Energy Requirements:  Current     Protein (g):  81-97 (1-1.2 g/kg); Weight Used for Protein Requirements:  Ideal        Fluid (ml/day):  2000; Method Used for Fluid Requirements:  1 ml/kcal      Nutrition Related Findings:  laying in bed, no hx DM noted      Wounds:  Surgical Incision       Current Nutrition Therapies:    Dietary Nutrition Supplements: Low Calorie High Protein Supplement  DIET GENERAL; Carb Control: 5 carb choices (75 gms)/meal    Anthropometric Measures:  · Height: 6' (182.9 cm)  · Current Body Weight: 224 lb 6.9 oz (101.8 kg)   · Admission Body Weight: 214 lb 8.1 oz (97.3 kg)    · Usual Body Weight: 206 lb (93.4 kg)(per hx past year)     · Ideal Body Weight: 178 lbs; % Ideal Body Weight 120.5 %   · BMI: 30.4  · Adjusted Body Weight:  ; No Adjustment   · BMI Categories: Overweight (BMI 25.0-29. 9)       Nutrition Diagnosis:   · Predicted inadequate energy intake related to pain as evidenced by other (comment)(limited po data, not much appetite today)      Nutrition Interventions:   Food and/or Nutrient Delivery:  Continue Current Diet, Continue Oral Nutrition Supplement  Nutrition Education/Counseling:  Education initiated   Coordination of Nutrition Care:  Continue to monitor while inpatient    Goals:  Patient will consume at least 75% at meals during stay       Nutrition Monitoring and Evaluation:   Behavioral-Environmental Outcomes:  None Identified   Food/Nutrient Intake Outcomes:  Diet Advancement/Tolerance, Food and Nutrient Intake, Supplement Intake  Physical Signs/Symptoms Outcomes:  Biochemical Data, GI Status, Skin, Weight, Meal Time Behavior     Discharge Planning:    Continue current diet     Electronically signed by Sara Panchal RD, LD on 12/23/20 at 11:04 AM EST    Contact: [phone]

## 2020-12-23 NOTE — PROGRESS NOTES
Physical Therapy      [x] daily progress note       [] discharge       Patient Name:  Giulia Corley   :  1950 MRN: [id number]  Room:  [address] [place] [address] Date of Admission: 2020  Rehabilitation Diagnosis:   Spinal stenosis, lumbar region with neurogenic claudication [M48.062]  Spinal stenosis of lumbar region with radiculopathy [M48.061, M54.16]       Date 2020       Day of ARU Week:  6   Time IN/OUT 6467-5472   Individual Tx Minutes 60   TOTAL Tx Time Mins 60   Variance Time    Variance Time []   Refusal due to:     []   Medical hold/reason:    []   Illness   []   Off Unit for test/procedure  []   Extra time needed to complete task  []   Therapeutic need  []   Other (specify):   Restrictions Restrictions/Precautions  Restrictions/Precautions: Weight Bearing, Fall Risk, General Precautions(B LE WBAT, back brace on when up)  Required Braces or Orthoses?: Yes  Position Activity Restriction  Spinal Precautions: No Bending, No Lifting, No Twisting  Spinal Precautions: Educated pt on precautions, pt verbalized understanding. Other position/activity restrictions: Wound vac on lumbar spine, newell catheter. Communication with other providers: [x]   OK to see per nursing:     []   Spoke with team member regarding:      Subjective observations and cognitive status: Pt resting in bed, reports having \"a bad night\" last night due to pain, but feels \"OK\" to participate   Pain level/location: 6/10       Location: LB at rest.    Discharge recommendations  Anticipated discharge date:  tbd  Destination: []??home alone   []? ?home alone with assist PRN     []? ? home w/ family      []? ? Continuous supervision  []? ?SNF    []? ? Assisted living     []? ? Other:  Continued therapy: []??C PT  []??OUTPATIENT  PT   []? ? No Further PT  []? ?SNF PT  Caregiver training recommended: []? ?Yes  []? ? No   Equipment needs: possibly 5\" wheel extensions for pt's current standard walker he received 2020; if not pt may req RW; has rollator but pt unsafe to use at this time. Bed Mobility:           []   Pt received out of bed   Rolling R/L:  Mod I with bed features  Scooting:  SBA sit scoot to EOB  Supine --> Sit:  Mod I with bed features  Sit --> Supine:  NT    Transfers:    Sit--> Stand:  CG-Min A pushing up with one hand from bed, WC and other on RW  Stand --> Sit:   CG-Min A for controlled descent, cues to reach back  Practiced repetitive sit<>stand transfers from slight elevated bed height with CGA, pt with improved stability and balance with repetition. Stand-Pivot:   Min A with RW bed >WC to R  Assistive device required for transfer:   RW    Gait:    Distance:  7'+18' x 2   Assistance: Total A ( Min-Mod A + WC follow of second person for safety)  Device:  RW  Gait Quality: slow non-recip pattern with heavy lean on AD, increasing trunk flexion with distance req cues for upright posture. Wheelchair Propulsion:  Distance:   67' + 80' retro for quad strengthening   Assistance:   Supervision  Extremities Used: B LEs only     Additional Therapeutic activities/exercises completed this date:     []   Nu-step:  Time:        Level:         #Steps:       []   Rebounder:    []  Seated     []  Standing        []   Balance training         []   Postural training    []   Supine ther ex (reps/sets):     [x]   Seated ther ex (reps/sets):  B LEs AROM 10 x 2, focus on eccentric control    []   Standing ther ex (reps/sets):     []   Picked up object from floor                       []   Reacher used   []   Other:   []   Other:   []   Other:      Patient/Caregiver Education and Training:   [x]   Bed Mobility/Transfer technique/safety  [x]   Gait technique/sequencing  [x]   Proper use of assistive device  []   Advanced mobility safety and technique  []   Reinforced patient's precautions with mobility/functional tasks  [x]   Postural awareness  []   Family training  [x]   Progress was updated and reviewed in Rehabtracker with patient and/or family this date. Treatment Plan for Next Session: gait progression with RW and close WC follow, stage 1 DLS ex with focus on ab strengthening/ control, LE therx, standing balance/endurance        Treatment/Activity Tolerance:   [x] Tolerated treatment with no adverse effects    [] Patient limited by fatigue  [] Patient limited by pain   [] Patient limited by medical complications:    [] Adverse reaction to Tx:   [] Significant change in status    Safety:       []  bed alarm set    [x]  chair alarm set    []  Pt refused alarms                []  Telesitter activated      [x]  Gait belt used during tx session      []other:         Number of Minutes/Billable Intervention  Gait Training 20   Therapeutic Exercise 10   Neuro Re-Ed    Therapeutic Activity 15   Wheelchair Propulsion 15   Group    Other:    TOTAL 60         Social History  Social/Functional History  Lives With: Significant other  Type of Home: House  Home Layout: One level  Home Access: Ramped entrance  Bathroom Shower/Tub: Tub/Shower unit, Shower chair without back(Pt does not use shower chair - pt bathes in tub.)  Bathroom Toilet: Standard  Bathroom Equipment: Grab bars in [address]: Rolling walker, Cane(Pt uses rollator at baseline, also has access to cane.)  ADL Assistance: Independent  Homemaking Assistance: (Rollator)  Homemaking Responsibilities: No(Pt's girlfriend performs IADLs)  Ambulation Assistance: Independent(Mod I with use of rollator)  Transfer Assistance: Independent  Active : No  Patient's  Info: Pt's girlfriend drives  Education: Some college  Occupation: Retired  Leisure & Hobbies: Martial arts  IADL Comments: Pt manages own medications. Additional Comments: Pt sleeps on flat bed at baseline. Pt reports no falls in the past year. Objective                                                                                    Goals:  (Update in navigator)   :   Long term goals  Time Frame for Long term goals : In 10 days:  Long term goal 1: Pt will complete all bed mobility independently  Long term goal 2: Pt will complete sit <> stand transfers with modAx1  Long term goal 3: Pt will ambulate 20 feet with modAx2 with LRAD  Long term goal 4: Pt will propel manual w/c 150 feet independently  Long term goal 5: Pt will independently complete 3 sets of 10 reps of BLE AROM exercises in available and allowed ROM:        Plan of Care                                                                              Times per week: 5 days per week for a minimum of 60 minutes/day plus group as appropriate for 60 minutes.   Treatment to include Current Treatment Recommendations: Strengthening, ROM, Balance Training, Transfer Training, ADL/Self-care Training, Functional Mobility Training, IADL Training, Neuromuscular Re-education, Safety Education & Training, Home Exercise Program, Endurance Training, Patient/Caregiver Education & Training, Equipment Evaluation, Education, & procurement, Gait Training, Pain Management, Positioning, Wheelchair Mobility Training, Stair training    Electronically signed by   Katelyn Marion, PTA #[id number]  12/23/2020, 8:34 AM

## 2020-12-23 NOTE — PROGRESS NOTES
Toileting: Only passed gas, did not have BM. Was able to partially assist with pants management up/down      Toilet Transfers:  Min A to<>from W/C  Device Used:    []   Standard Toilet         [x]   Grab Bars           [x]  Bedside Commode frame over toilet       []   Elevated Toilet          []   Other:        Bed Mobility:           []   Pt received out of bed   Supine --> Sit:  Supervision      Transfers:    Sit--> Stand:  Min A, one instance of CGA  Stand --> Sit:   Min A, typically is only able to bring 1 UE back  Stand-Pivot:  Min A  Other:    Assistive device required for transfer:   RW      Functional Mobility:    Assistance:  Supervision 50 ft + 150 ft; required cue for navigating unit, attempted to go into wrong room  Device:   []   Rolling Walker     []   Standard Walker [x]   Wheelchair        []   [place]       []   4-Wheeled Walker         []   Cardiac Walker       []   Other:        Additional Therapeutic activities/exercises completed this date:     []   ADL Training   [x]   Balance/Postural training: Pt stood x 1.5 + 1.75 + 2 min at counter with CGA while completing dynamic stand task while reaching outside base of support to facilitate increased balance for ADL tasks and transfers. Requires cues to achieve more upright vs flexed posture, only able to minimally sustain      [x]   Bed/Transfer Training   [x]   Endurance Training   []   Neuromuscular Re-ed   []   Nu-step:  Time:        Level:         #Steps:       []   Rebounder:    []  Seated     []  Standing        []   Supine Ther Ex (reps/sets):     [x]   Seated Ther Ex (reps/sets): To increase BUE endurance for ADLs and transfers pt completed arm ergometer on min resistance x5 mins, 0 rest breaks. Pt reported shoulder fatigue at end of task   []   Standing Ther Ex (reps/sets):     []   Other:      Comments: All intervention performed to increase pt's endurance, ax tolerance, balance, and I c ADLs/IADLs and functional transfers/mobility. Patient/Caregiver Education and Training:   []   [employer] Equipment Use  [x]   Bed Mobility/Transfer Technique/Safety  []   Energy Conservation Tips  []   Family training  [x]   Postural Awareness  [x]   Safety During Functional Activities  []   Reinforced Patient's Precautions   []   Progress was updated and reviewed in Rehabtracker with patient and/or family this         date.     Treatment Plan for Next Session:  Continue OT POC     Assessment: Pt's standing tolerance was improved today however posture is still flexed        Treatment/Activity Tolerance:   [x] Tolerated treatment with no adverse effects    [] Patient limited by fatigue  [] Patient limited by pain   [] Patient limited by medical complications:    [] Adverse reaction to Tx:   [] Significant change in status    Safety:       []  bed alarm set    [x]  chair alarm set    []  Pt refused alarms                []  Telesitter activated      [x]  Gait belt used during tx session      []other:       Number of Minutes/Billable Intervention  Therapeutic Exercise    ADL Self-care    Neuro Re-Ed    Therapeutic Activity 60   Group    Other:    TOTAL 60       Social History  Social/Functional History  Lives With: Significant other  Type of Home: House  Home Layout: One level  Home Access: Ramped entrance  Bathroom Shower/Tub: Tub/Shower unit, Shower chair without back(Pt does not use shower chair - pt bathes in tub.)  Bathroom Toilet: Standard  Bathroom Equipment: Grab bars in shower  Home Equipment: Rolling walker, Cane(Pt uses rollator at baseline, also has access to cane.)  ADL Assistance: [facility]: (Rollator)  Homemaking Responsibilities: No(Pt's girlfriend performs IADLs)  Ambulation Assistance: Independent(Mod I with use of rollator)  Transfer Assistance: Independent  Active : No  Patient's  Info: Pt's girlfriend drives  Education: Some college  Occupation: Retired  Leisure & Hobbies: Martial arts  IADL Comments: Pt manages own medications. Additional Comments: Pt sleeps on flat bed at baseline. Pt reports no falls in the past year. Objective                                                                                    Goals:  (Update in navigator)  Short term goals  Time Frame for Short term goals: STG=LTG:  Long term goals  Time Frame for Long term goals : 10-14 days  Long term goal 1: Pt will perform all grooming tasks with Mod I.  Long term goal 2: Pt will perform sponge bathing with Min A and AE/DME. Long term goal 3: Pt will perform UB dressing with Mod I.  Long term goal 4: Pt will perform LB dressing with Min A and AE/DME. Long term goal 5: Pt will don/doff footwear with Mod I and AE. Long term goals 6: Pt will complete toileting with Min A and AE/DME. Long term goal 7: Pt will complete all functional transfers (toilet, tub, bed) with Min A and DME. Long term goal 8: Pt will participate in therex/theract with emphasis on static stance >3-5 min to increase standing tolerance and endurance needed for ADLs/IADLs. :        Plan of Care                                                                              Times per week: 5 days per week for a minimum of 60 minutes/day plus group as appropriate for 60 minutes. Treatment to include Plan  Times per week: 5x/week  Times per day: Daily  Plan weeks: 10-14 days  Specific instructions for Next Treatment: LB AE education  Current Treatment Recommendations: Strengthening, Wheelchair Mobility Training, Pain Management, Positioning, ROM, Gait Training, Safety Education & Training, Balance Training, Stair training, Patient/Caregiver Education & Training, Self-Care / ADL, Functional Mobility Training, Equipment Evaluation, Education, & procurement, Home Management Training, Endurance Training    Electronically signed by   Edel Fitch MS, OTR/L  License #[id number]  12/23/2020, 1:46 PM

## 2020-12-23 NOTE — PROGRESS NOTES
home w/ family      [] Continuous supervision  []SNF    [] Assisted living     [] Other:  Continued therapy: []HHC PT  []OUTPATIENT  PT   [] No Further PT  []SNF PT  Caregiver training recommended: []Yes  [] No   Equipment needs: tbd     Bed Mobility:           []   Pt received out of bed   Scooting:  supervision  Sit --> lying:  Supervision with bed rail  Bed features used: [] Yes  [] No       Transfers:    Sit--> Stand:  Variable performance min to mod assist  Stand --> Sit:   CGA  Chair-->Bed/Bed --> Chair:   Mod assist overall with most assist coming during turn of walker      Wheelchair Propulsion:  Distance: 10-20' in room negotiating  Small areas with min assist and max verbal cues  Extremities Used:  B UE/LE  Type:    [x]  Manual        []  Electric      Additional Therapeutic activities/exercises completed this date:     []   Nu-step:  Time:        Level:         #Steps:       []   Rebounder:    []  Seated     []  Standing        []   Balance training         []   Postural training    [x]   Supine ther ex (reps/sets):  Quad sets, glut sets 5x2   [x]   Seated ther ex (reps/sets): LAQ 5x2 through 30-50% of ROM, light t-band for DF,  knee flexion, hip abduction 10 x2   heavy t-band  marching motion with heavy t-band into hip extension, PF 10 x2,      []   Standing ther ex (reps/sets):     []   Picking up object from floor (standing):                   []   Reacher used   []   Other:   []   Other:    Comments:      Patient/Caregiver Education and Training:   []   Role of PT  [x]   Education about Dx: educated pt in side effects of opioids and in alternative measures for pain relief. Pt states he would like to get off the opioids.    [x]   Use of call light for assist   [x]   Wheelchair mobility/management  [x]   HEP provided and explained   []   Treatment plan reviewed  []   Home safety  [x]   Body mechanics  []   Positioning  [x]   Bed Mobility/Transfer technique  []   Gait technique/sequencing  []   Proper use of assistive device/adaptive equipment  []   Stair training/Advanced mobility safety and technique  [x]   Reinforced patient's precautions/mobility while maintaining precautions  []   Postural awareness  []   Family/caregiver training  []   Progress was updated and reviewed in Rehabtracker with patient and/or family this date.   []   Other:      Treatment Plan for Next Session: progression of gait, LE extension ex in standing      Assessment: Pt with good improvement in mobility today, though fatigued in afternoon     Treatment/Activity Tolerance:   [] Tolerated treatment with no adverse effects    [x] Patient limited by fatigue  [] Patient limited by pain   [] Patient limited by medical complications:    [] Adverse reaction to Tx:   [] Significant change in status    Barrier/s to progress/learning:   []   None  [x]   Cognition  []   Hearing deficit  []   Pre-morbid mental/psychological status   []   Motivation  []   Communication  []   Anxiety  []   Vision deficit  []   Attention  []   Other:      Safety:       [x]  bed alarm set    []  chair alarm set    []  Pt refused alarms                []  Telesitter activated      [x]  Gait belt used during tx session      []other:         Number of Minutes/Billable Intervention  Gait Training    Therapeutic Exercise 45   Neuro Re-Ed    Therapeutic Activity 15   Wheelchair Propulsion    Group    Other:    TOTAL 60         Social History  Social/Functional History  Lives With: Significant other  Type of Home: House  Home Layout: One level  Home Access: Ramped entrance  Bathroom Shower/Tub: Tub/Shower unit, Shower chair without back(Pt does not use shower chair - pt bathes in tub.)  Bathroom Toilet: Standard  Bathroom Equipment: Grab bars in shower  Home Equipment: Rolling walker, Cane(Pt uses rollator at baseline, also has access to cane.)  ADL Assistance: [address] [place] [address]: (Rollator)  Homemaking Responsibilities: No(Pt's girlfriend performs IADLs)  Ambulation Assistance: Independent(Mod I with use of rollator)  Transfer Assistance: Independent  Active : No  Patient's  Info: Pt's girlfriend drives  Education: Some college  Occupation: Retired  Leisure & Hobbies: Martial arts  IADL Comments: Pt manages own medications. Additional Comments: Pt sleeps on flat bed at baseline. Pt reports no falls in the past year. Objective                                                                                    Goals:  (Update in navigator)   : Long term goals  Time Frame for Long term goals : In 10 days:  Long term goal 1: Pt will complete all bed mobility independently  Long term goal 2: Pt will complete sit <> stand transfers with modAx1  Long term goal 3: Pt will ambulate 20 feet with modAx2 with LRAD  Long term goal 4: Pt will propel manual w/c 150 feet independently  Long term goal 5: Pt will independently complete 3 sets of 10 reps of BLE AROM exercises in available and allowed ROM:        Plan of Care                                                                              Times per week: 5 days per week for a minimum of 60 minutes/day plus group as appropriate for 60 minutes.   Treatment to include Current Treatment Recommendations: Strengthening, ROM, Balance Training, Transfer Training, ADL/Self-care Training, Functional Mobility Training, IADL Training, Neuromuscular Re-education, Safety Education & Training, Home Exercise Program, Endurance Training, Patient/Caregiver Education & Training, Equipment Evaluation, Education, & procurement, Gait Training, Pain Management, Positioning, Wheelchair Mobility Training, Stair training    Electronically signed by   Paco Austin  12/23/2020, 8:24 AM

## 2020-12-24 LAB
GLUCOSE BLD-MCNC: 108 MG/DL (ref 70–99)
GLUCOSE BLD-MCNC: 127 MG/DL (ref 70–99)
GLUCOSE BLD-MCNC: 144 MG/DL (ref 70–99)
GLUCOSE BLD-MCNC: 99 MG/DL (ref 70–99)

## 2020-12-24 PROCEDURE — 94761 N-INVAS EAR/PLS OXIMETRY MLT: CPT

## 2020-12-24 PROCEDURE — 97112 NEUROMUSCULAR REEDUCATION: CPT

## 2020-12-24 PROCEDURE — 6370000000 HC RX 637 (ALT 250 FOR IP): Performed by: PHYSICAL MEDICINE & REHABILITATION

## 2020-12-24 PROCEDURE — 97530 THERAPEUTIC ACTIVITIES: CPT

## 2020-12-24 PROCEDURE — 97110 THERAPEUTIC EXERCISES: CPT

## 2020-12-24 PROCEDURE — 82962 GLUCOSE BLOOD TEST: CPT

## 2020-12-24 PROCEDURE — 97535 SELF CARE MNGMENT TRAINING: CPT

## 2020-12-24 PROCEDURE — 97542 WHEELCHAIR MNGMENT TRAINING: CPT

## 2020-12-24 PROCEDURE — 51798 US URINE CAPACITY MEASURE: CPT

## 2020-12-24 PROCEDURE — 6370000000 HC RX 637 (ALT 250 FOR IP): Performed by: NURSE PRACTITIONER

## 2020-12-24 PROCEDURE — 99232 SBSQ HOSP IP/OBS MODERATE 35: CPT | Performed by: PHYSICAL MEDICINE & REHABILITATION

## 2020-12-24 PROCEDURE — 1280000000 HC REHAB R&B

## 2020-12-24 PROCEDURE — 97116 GAIT TRAINING THERAPY: CPT

## 2020-12-24 RX ORDER — POLYETHYLENE GLYCOL 3350 17 G/17G
17 POWDER, FOR SOLUTION ORAL DAILY
Status: DISCONTINUED | OUTPATIENT
Start: 2020-12-24 | End: 2021-01-05 | Stop reason: HOSPADM

## 2020-12-24 RX ORDER — METOCLOPRAMIDE 10 MG/1
10 TABLET ORAL ONCE
Status: COMPLETED | OUTPATIENT
Start: 2020-12-24 | End: 2020-12-24

## 2020-12-24 RX ADMIN — ACETAMINOPHEN 1000 MG: 500 TABLET ORAL at 12:05

## 2020-12-24 RX ADMIN — METOCLOPRAMIDE 10 MG: 10 TABLET ORAL at 12:08

## 2020-12-24 RX ADMIN — OXYCODONE HYDROCHLORIDE 10 MG: 5 TABLET ORAL at 07:52

## 2020-12-24 RX ADMIN — MAGNESIUM CITRATE 296 ML: 1.75 LIQUID ORAL at 13:03

## 2020-12-24 RX ADMIN — DOCUSATE SODIUM 100 MG: 100 CAPSULE ORAL at 07:49

## 2020-12-24 RX ADMIN — ASPIRIN 81 MG CHEWABLE TABLET 81 MG: 81 TABLET CHEWABLE at 07:49

## 2020-12-24 RX ADMIN — METOPROLOL TARTRATE 50 MG: 50 TABLET, FILM COATED ORAL at 07:49

## 2020-12-24 RX ADMIN — ACETAMINOPHEN 1000 MG: 500 TABLET ORAL at 07:49

## 2020-12-24 RX ADMIN — ACETAMINOPHEN 1000 MG: 500 TABLET ORAL at 20:11

## 2020-12-24 RX ADMIN — OXYCODONE HYDROCHLORIDE 10 MG: 5 TABLET ORAL at 04:13

## 2020-12-24 RX ADMIN — STANDARDIZED SENNA CONCENTRATE 8.6 MG: 8.6 TABLET ORAL at 20:11

## 2020-12-24 RX ADMIN — METFORMIN HYDROCHLORIDE 500 MG: 500 TABLET ORAL at 17:39

## 2020-12-24 RX ADMIN — METOPROLOL TARTRATE 50 MG: 50 TABLET, FILM COATED ORAL at 20:11

## 2020-12-24 RX ADMIN — OXYCODONE HYDROCHLORIDE 10 MG: 5 TABLET ORAL at 00:14

## 2020-12-24 RX ADMIN — PRAVASTATIN SODIUM 40 MG: 40 TABLET ORAL at 20:11

## 2020-12-24 RX ADMIN — ACETAMINOPHEN 1000 MG: 500 TABLET ORAL at 17:39

## 2020-12-24 RX ADMIN — POLYETHYLENE GLYCOL (3350) 17 G: 17 POWDER, FOR SOLUTION ORAL at 07:52

## 2020-12-24 ASSESSMENT — PAIN DESCRIPTION - FREQUENCY
FREQUENCY: INTERMITTENT
FREQUENCY: CONTINUOUS
FREQUENCY: CONTINUOUS

## 2020-12-24 ASSESSMENT — PAIN DESCRIPTION - ONSET: ONSET: ON-GOING

## 2020-12-24 ASSESSMENT — PAIN DESCRIPTION - DIRECTION: RADIATING_TOWARDS: RIGHT

## 2020-12-24 ASSESSMENT — PAIN DESCRIPTION - DESCRIPTORS
DESCRIPTORS: ACHING

## 2020-12-24 ASSESSMENT — PAIN DESCRIPTION - PROGRESSION
CLINICAL_PROGRESSION: GRADUALLY IMPROVING
CLINICAL_PROGRESSION: NOT CHANGED

## 2020-12-24 ASSESSMENT — PAIN DESCRIPTION - ORIENTATION
ORIENTATION: LOWER
ORIENTATION: LOWER

## 2020-12-24 ASSESSMENT — PAIN SCALES - GENERAL
PAINLEVEL_OUTOF10: 7
PAINLEVEL_OUTOF10: 1
PAINLEVEL_OUTOF10: 2
PAINLEVEL_OUTOF10: 0

## 2020-12-24 ASSESSMENT — PAIN DESCRIPTION - LOCATION
LOCATION: BACK
LOCATION: BACK

## 2020-12-24 NOTE — PROGRESS NOTES
family      [] Continuous supervision  []SNF    [] Assisted living     [] Other:  Continued therapy: [x]HHC PT  []OUTPATIENT  PT   [] No Further PT  []SNF PT  Caregiver training recommended: []Yes  [] No   Equipment needs: 2ww     Bed Mobility:           []   Pt received out of bed   Rolling R/L:  Supervision/mod I  Scooting: Mod I sitting, supervision supine  Lying --> Sit:  Supervision  Sit --> lying:  supervision  Bed features used: [x] Yes  [] No HOB ~15 degrees, bed rail      Transfers:    Sit--> Stand:  Min assist 80%, mod assist 20% dependent on fatigue  Stand --> Sit:   Min assist for controlled descent  Chair-->Bed/Bed --> Chair:   Min assist with cues for posture and control  Assistive device required for transfer:   Back brace, 2ww    Gait:    Distance:  28', 55'  Assistance:  Min assist of 1 with close w/c follow of another  Device:  Back brace, 2ww  Gait Quality:  Increased leaning on UEs, cues for tall posture, trunk engagement and terminal knee extension in stance. Pt attempted to walk with continuous steps/continuous movement of walker but did not control as well with postural decline. Educated pt in 3 point gait with step-past pattern with pt demonstrating better control and posture with 3 point gait at this time. Wheelchair Propulsion:  Distance:  48', 72'   Assistance:  supervision  Extremities Used:   BUE  Type:    [x]  Manual        []  Electric        Additional Therapeutic activities/exercises completed this date:     [x]   Nu-step:  Time: 10 min     Level: 5      Arms set at level 13 to ensure no trunk twisting with good control. Pt had a break at 5 min due to CNP from  [facility] stopped to check his incision      []   Rebounder:    []  Seated     []  Standing        []   Balance training         [x]   Postural training: Used // bars and mirror for visual feed back for static standing in midline from lateral perspective.  Pt needed cues for knee extension and trunk extension to neutral. Progressed to slight knee bends with return to full extension and cues for trunk engagement and to only use UE as much as needed. Pt tolerated 5 reps x2 before muscle fatigue. [x]   Supine ther ex (reps/sets): quad sets with cues for technique, heel slides, educated in abduction with leg  due to undue strain when attempted without. All 10 x1    [x]   Seated ther ex (reps/sets): AROM LAQ with 3 second hold at max ext attainable 5x1, LAQ for fast power kick with 4# 5x1, medium tband for abduction, hamstring curl, L PF, AP heavy t-band for R PF each 10 x1   []   Standing ther ex (reps/sets):     []   Picking up object from floor (standing):                   []   Reacher used   []   Other:   []   Other:    Comments: Mod rests given between each ex due to muscle fatigue. Patient/Caregiver Education and Training:   []   Role of PT  [x]   Education about Dx  []   Use of call light for assist   [x]   Wheelchair mobility/management  [x]   HEP provided and explained   [x]   Treatment plan reviewed  []   Home safety  []   Body mechanics  []   Positioning  [x]   Bed Mobility/Transfer technique  [x]   Gait technique/sequencing  [x]   Proper use of assistive device/adaptive equipment  []   Stair training/Advanced mobility safety and technique  [x]   Reinforced patient's precautions/mobility while maintaining precautions: cues for back brace don/doff  [x]   Postural awareness  []   Family/caregiver training  []   Progress was updated and reviewed in Rehabtracker with patient and/or family this date. []   Other:      Treatment Plan for Next Session: progress gait, posture, LE strength    Assessment: Pt with good progression of gait and resistance in therex today.  Pt pleased, but very fatigued      Treatment/Activity Tolerance:   [] Tolerated treatment with no adverse effects    [x] Patient limited by fatigue  [] Patient limited by pain   [] Patient limited by medical complications:    [] Adverse reaction to Tx:   [] Significant change in status    Barrier/s to progress/learning:   []   None  [x]   Cognition  []   Hearing deficit  []   Pre-morbid mental/psychological status   []   Motivation  []   Communication  []   Anxiety  []   Vision deficit  []   Attention  []   Other:      Safety:       [x]  bed alarm set    []  chair alarm set    []  Pt refused alarms                []  Telesitter activated      [x]  Gait belt used during tx session      []other:         Number of Minutes/Billable Intervention  Gait Training 25   Therapeutic Exercise 45   Neuro Re-Ed 20   Therapeutic Activity 20   Wheelchair Propulsion 10   Group    Other:    TOTAL 120         Social History  Social/Functional History  Lives With: Significant other  Type of Home: House  Home Layout: One level  Home Access: Ramped entrance  Bathroom Shower/Tub: Tub/Shower unit, Shower chair without back(Pt does not use shower chair - pt bathes in tub.)  Bathroom Toilet: Standard  Bathroom Equipment: Grab bars in [address] [place] [address]: Rolling walker, Cane(Pt uses rollator at baseline, also has access to cane.)  ADL Assistance: [address] [place] [address]: (Rollator)  Homemaking Responsibilities: No(Pt's girlfriend performs IADLs)  Ambulation Assistance: Independent(Mod I with use of rollator)  Transfer Assistance: Independent  Active : No  Patient's  Info: Pt's girlfriend drives  Education: Some college  Occupation: Retired  Leisure & Hobbies: Martial arts  IADL Comments: Pt manages own medications. Additional Comments: Pt sleeps on flat bed at baseline. Pt reports no falls in the past year. Objective                                                                                    Goals:  (Update in navigator)   : Long term goals  Time Frame for Long term goals :  In 10 days:  Long term goal 1: Pt will complete all bed mobility independently  Long term goal 2: Pt will complete sit <> stand transfers with modAx1  Long term goal 3: Pt will ambulate 20 feet with modAx2 with LRAD  Long term goal 4: Pt will propel manual w/c 150 feet independently  Long term goal 5: Pt will independently complete 3 sets of 10 reps of BLE AROM exercises in available and allowed ROM:        Plan of Care                                                                              Times per week: 5 days per week for a minimum of 60 minutes/day plus group as appropriate for 60 minutes.   Treatment to include Current Treatment Recommendations: Strengthening, ROM, Balance Training, Transfer Training, ADL/Self-care Training, Functional Mobility Training, IADL Training, Neuromuscular Re-education, Safety Education & Training, Home Exercise Program, Endurance Training, Patient/Caregiver Education & Training, Equipment Evaluation, Education, & procurement, Gait Training, Pain Management, Positioning, Wheelchair Mobility Training, Stair training    Electronically signed by   Umberto Lancaster PT  12/24/2020, 1:24 PM

## 2020-12-24 NOTE — CARE COORDINATION
LSW met with patient following Care Conference. LSW informed patient of recommendations for a rolling walker, bedside commode, grab bars for the bathroom, and possibly a manual wheelchair. Patient states he already owns a bedside commode, but agrees with rest of recommendations. LSW then informed of recommendation for [facility]  PT, OT, and Nursing and patient is agreeable to all. Patient verbalized understanding and will choose an agency closer to discharge. D/C Plan:  Date:  Jan. 5th  DME:  [facility], [address]?, grab bars  HHC:  PT, OT, Nursing (Agency TBD)  To:   Home with friend (friend [facility], [facility] will transport)

## 2020-12-24 NOTE — PROGRESS NOTES
urinary retention. Plan of care reviewed with patient at bedside. Objective:  Patient awake, alert, oriented. Compartments soft, - Rafia's, capillary refill <2s. Side:  Bilateral  4/5 strength L2-S1 left but improving, 5/5 right  Sensation reduced to left L5/S1 nerve distributions but improving, normal on right  2+ DP pulses  Negative babinski  Negative clonus  Dressing C/D/I. Small area of dry serosanguinous drainage noted. Vitals:  Vitals:    12/24/20 0749   BP: (!) 128/92   Pulse: 89   Resp:    Temp:    SpO2:         I/O last 3 completed shifts: In: 600 [P.O.:600]  Out: 2225 [Urine:2225]    BMP: @LABRCNTIP(NA:3,K:3,CL:3,CO2:3,BUN:3,CREATININE:3,GLU:3,CALCIUM:3)@  CBC: @LABRCNTIP(WBC:3,HEMOGLOBIN:3,HCT:3,PLT:3)@        Allergies: Allergies   Allergen Reactions    Crestor [Rosuvastatin Calcium]     Lipitor [Atorvastatin] Other (See Comments)     Leg pain      Celebrex [Celecoxib] Other (See Comments)       PMH:   Past Medical History:   Diagnosis Date    2-vessel coronary artery disease     Mild to moderate    CAD (coronary artery disease)     Dizziness     AGUILAR (dyspnea on exertion)     H/O cardiovascular stress test 8/25/2013    thallium, normal EF59%    H/O echocardiogram 9/19/12     EF45-50% LVH, sclerotic AV and normal MV, trace TR and MR    History of cardiac catheterization 9/30/08    History of cardiovascular stress test 5/13/10, 8/8/08    The post stress myocardial perfusion images show a normal pattern of perfusion in all regions. The post stress left ventricle is normal in size. There is no scintigraphic evidence of inducible myocardial ischemia. This is a new change over previous study. No wall motion abnormalities seen. The rest ejection fraction is 49%. Global left ventricular systolic function is normal. Exercise capacity 5 METS.  History of echocardiogram 5/11/11, 4/8/10, 11/18/09, 8/7/08    normal left ventricle dimensions with preserved systolic function. LV ejection fraction is approximately 50-55%. Left ventricle wall is hypertrophic. Diastolic dysfunction is present. Normal right ventricle. Mildly enlarged left atrium. Sclerotic aortic valve and normal mitral valve. no pericardial effusion or mural thrombus. Doppler eval reveals trace tricuspid regurg. and trace mitral regurg.      History of echocardiogram 11/14/2016    EF55%-Trace MR&TR    History of nuclear stress test 11/15/2016    lexiscan-normal,EF65%    History of nuclear stress test 01/02/2020    EF 60%, Normal    Hyperlipidemia     Obesity            Admitting Diagnosis: Lumbar stenosis with neurogenic claudication L3-S1      Zaira Barajas, APRN-CNP, 12/24/2020 12:04 PM

## 2020-12-24 NOTE — FLOWSHEET NOTE
Patient identifies as Oriental orthodox. Patient stated that spirituality is important to him. Patient talked about his health and how he is experiencing the presence of God in this particular time of pain and suffering. Patient draws he strength from chanell and family. Rev. Paige, [address]     12/24/20 9637   Encounter Summary   Services provided to: Patient   Referral/Consult From: [facility]; Other (Comment)   Place of [facility]   Continue Visiting Yes   Complexity of Encounter Low   Length of Encounter 30 minutes   Spiritual Assessment Completed Yes   Routine   Type Initial   Assessment Calm; Approachable;Peaceful   Intervention Explored feelings, thoughts, concerns;Explored coping resources;Nurtured hope;Prayer;Discussed relationship with God;Discussed illness/injury and it's impact   Outcome Expressed gratitude;Expressed feelings of imani, peace, and/or awe;Encouraged; Hopeful;Receptive   Spiritual/Druze   Type Spiritual support

## 2020-12-24 NOTE — PROGRESS NOTES
Occupational Therapy  Physical Rehabilitation: OCCUPATIONAL THERAPY     [x] daily progress note       [] discharge       Patient Name:  Rubin Rizvi   :  1950 MRN: [id number]  Room:  [address] [place], [place] [address] Date of Admission: 2020  Rehabilitation Diagnosis:   Spinal stenosis, lumbar region with neurogenic claudication [M48.062]  Spinal stenosis of lumbar region with radiculopathy [M48.061, M54.16]       Date 2020       Day of ARU Week:  7   Time IN/OUT 1002/1104   Individual Tx Minutes 62   Group Tx Minutes    Co-Treat Minutes    Concurrent Tx Minutes    TOTAL Tx Time Mins 62   Variance Time    Variance Time []   Refusal due to:     []   Medical hold/reason:    []   Illness   []   Off Unit for test/procedure  []   Extra time needed to complete task  []   Therapeutic need  []   Other (specify):   Restrictions Restrictions/Precautions  Restrictions/Precautions: Weight Bearing, Fall Risk, General Precautions(B LE WBAT, back brace on when up)  Required Braces or Orthoses?: Yes  Position Activity Restriction  Spinal Precautions: No Bending, No Lifting, No Twisting  Spinal Precautions: Educated pt on precautions, pt verbalized understanding. Other position/activity restrictions: Wound vac on lumbar spine, newell catheter. Communication with other providers: [x]   OK to see per nursing:     [x]   Spoke with [facility] LPN regarding pt's report that catheter is supposed to be removed; LPN aware and waiting on order per report     Subjective observations and cognitive status: Pt in sidelying on approach, agreeable to ADL session, but declined full shower today 2* back pain. Reported he had some BLE spasming last night but that has subsided   Pain level/location:   6 /10       Location: Lower back   Discharge recommendations  Anticipated discharge date:   Destination: []??home alone   []? ?home alone w assist prn [x]? ? home w/ family    []? ? Continuous supervision       []? ?SNF            []? ? Assisted living     []? ? Other:   Continued therapy: [x]? ?HHC OT  []??OUTPATIENT  OT   []?? No Further OT  Equipment needs: BSC, grab bars in shower? (HIT F2 to transition between stars)      Grooming:   Ind seated  Oral Hygiene:  Ind seated      Bathing:    CGA while in stance to bathe bottom during sinkside ADLs, demo'ed good compliance with spinal precautions not twisting during task. Used LHS and reacher seated to wash/dry BLEs with min cues for technique      Dressing:      Upper Body Dressing:  Supervision to doff/don T shirt and LSO, still requires min cues for brace management    Lower Body Dressing:  Min A d/t requiring partial assist in back for pants management up; was CGA while in stance for pants management down; pt able to use reacher to unthread/thread BLEs in shorts. Therapist assisted threading catheter    Footwear:  Min A: pt able to use reacher to [facility] hospital socks and sock aid to don. Required assist to doff thigh high PARISA hose: as pt had been wearing for multiple consecutive days pt agreeable to leave off for skin integrity purposes. Educated on proper wearing schedule    Toileting:  Denied need    Bed Mobility:           []   Pt received out of bed   Supine --> Sit:  Supervision      Transfers:    Sit--> Stand:  Min A  Stand --> Sit:   Min A  Stand-Pivot:   Min A  Other:    Assistive device required for transfer:    RW      Additional Therapeutic activities/exercises completed this date:     [x]   ADL Training   [x]   Balance/Postural training     [x]   Bed/Transfer Training   [x]   Endurance Training   []   Neuromuscular Re-ed   []   Nu-step:  Time:        Level:         #Steps:       []   Rebounder:    []  Seated     []  Standing        []   Supine Ther Ex (reps/sets):     []   Seated Ther Ex (reps/sets):     []   Standing Ther Ex (reps/sets):     []   Other:      Comments:   All intervention performed to increase pt's endurance, ax tolerance, balance,  and I c ADLs/IADLs and functional transfers/mobility. Patient/Caregiver Education and Training:   []   [employer] Equipment Use  [x]   Bed Mobility/Transfer Technique/Safety  []   Energy Conservation Tips  []   Family training  [x]   Postural Awareness  [x]   Safety During Functional Activities  [x]   Reinforced Patient's Precautions   []   Progress was updated and reviewed in Rehabtracker with patient and/or family this         date.      Treatment Plan for Next Session: Continue OT POC, full shower next ADL session     Assessment: Pt is progressing with dynamic standing balance for ADL completion        Treatment/Activity Tolerance:   [x] Tolerated treatment with no adverse effects    [] Patient limited by fatigue  [] Patient limited by pain   [] Patient limited by medical complications:    [] Adverse reaction to Tx:   [] Significant change in status    Safety:       []  bed alarm set    [x]  chair alarm set    []  Pt refused alarms                []  Telesitter activated      [x]  Gait belt used during tx session      []other:       Number of Minutes/Billable Intervention  Therapeutic Exercise    ADL Self-care 62   Neuro Re-Ed    Therapeutic Activity    Group    Other:    TOTAL 62       Social History  Social/Functional History  Lives With: Significant other  Type of Home: House  Home Layout: One level  Home Access: Ramped entrance  Bathroom Shower/Tub: Tub/Shower unit, Shower chair without back(Pt does not use shower chair - pt bathes in tub.)  Bathroom Toilet: Standard  Bathroom Equipment: Grab bars in shower  Home Equipment: Rolling walker, Cane(Pt uses rollator at baseline, also has access to cane.)  ADL Assistance: Independent  Homemaking Assistance: (Rollator)  Homemaking Responsibilities: No(Pt's girlfriend performs IADLs)  Ambulation Assistance: Independent(Mod I with use of rollator)  Transfer Assistance: Independent  Active : No  Patient's  Info: Pt's girlfriend drives  Education: Some college  Occupation: Retired  Leisure & Hobbies: Martial arts  IADL Comments: Pt manages own medications. Additional Comments: Pt sleeps on flat bed at baseline. Pt reports no falls in the past year. Objective                                                                                    Goals:  (Update in navigator)  Short term goals  Time Frame for Short term goals: STG=LTG:  Long term goals  Time Frame for Long term goals : 10-14 days  Long term goal 1: Pt will perform all grooming tasks with Mod I.  Long term goal 2: Pt will perform sponge bathing with Min A and AE/DME. Long term goal 3: Pt will perform UB dressing with Mod I.  Long term goal 4: Pt will perform LB dressing with Min A and AE/DME. Long term goal 5: Pt will don/doff footwear with Mod I and AE. Long term goals 6: Pt will complete toileting with Min A and AE/DME. Long term goal 7: Pt will complete all functional transfers (toilet, tub, bed) with Min A and DME. Long term goal 8: Pt will participate in therex/theract with emphasis on static stance >3-5 min to increase standing tolerance and endurance needed for ADLs/IADLs. :        Plan of Care                                                                              Times per week: 5 days per week for a minimum of 60 minutes/day plus group as appropriate for 60 minutes. Treatment to include Plan  Times per week: 5x/week  Times per day: Daily  Plan weeks: 10-14 days  Specific instructions for Next Treatment: LB AE education  Current Treatment Recommendations: Strengthening, Wheelchair Mobility Training, Pain Management, Positioning, ROM, Gait Training, Safety Education & Training, Balance Training, Stair training, Patient/Caregiver Education & Training, Self-Care / ADL, Functional Mobility Training, Equipment Evaluation, Education, & procurement, Home Management Training, Endurance Training    Electronically signed by   Jade Arriola MS, OTR/L  License #[id number]  12/24/2020, 10:18 AM

## 2020-12-24 NOTE — PROGRESS NOTES
Mariangel Braggvenita Hurtado.    : 1950  Acct #: [de-identified]  MRN: [id number]              PM&R Progress Note      Admitting diagnosis: Lumbar spinal stenosis with radiculopathy (IGC 3.9)     Comorbid diagnoses impacting rehabilitation: Uncontrolled pain, generalized weakness, gait disturbance, acute urinary retention, essential hypertension, uncontrolled diabetes type 2 with peripheral neuropathy, COPD, status post left total knee arthroplasty.     Chief complaint: He is still reluctant to give up the Cordero. He feels weak and painful. Prior (baseline) level of function: Independent. Current level of function:         Current  IRF-MIREIL[place] and Goals:   Hearing, Speech, and Vision  Expression of Ideas and Wants: Without difficulty  Understanding Verbal and Non-Verbal Content: Understands  Prior Functioning: Everyday Activities  Self Care: Independent  Indoor Mobility (Ambulation): Independent  Stairs: Independent  Functional Cognition: Independent  Prior Device Use: Walker    Eating  Assistance Needed: Independent  Comment: Per pt report  CARE Score: 6  Oral Hygiene  Assistance Needed: Setup or clean-up assistance  Comment: Pt performed oral care w/c level with setup. CARE Score: 5  Discharge Goal: [address]  Reason if not Attempted: Not attempted due to medical condition or safety concerns(Cordero catheter)  CARE Score: 88  Discharge Goal: Partial/moderate assistance  Shower/Bathe Self  Assistance Needed: Substantial/maximal assistance, Partial/moderate assistance  Comment: Pt performed sponge bathing w/c level with Mod A to wash anterior/posterior vikki area and distal BLE 2/2 spinal precautions. Discharge Goal: Partial/moderate assistance  Upper Body Dressing  Assistance Needed: Partial/moderate assistance  Comment: Pt doffed/donned shirt with SBA, however req Max A to don TLSO, requiring Mod A overall.   CARE Score: 3  Discharge Goal: Independent  Lower Body Dressing  Assistance Needed: Dependent  Comment: Don/doff pants Dep x2 with use of Lind Se for STS. Pt unable to thread LE 2/2 sternal precautions. CARE Score: 1  Discharge Goal: Partial/moderate assistance  Putting On/Taking Off Footwear  Assistance Needed: Dependent  Comment: Dep to don/doff socks, pt demo poor knee extension to assist with lifting LE. CARE Score: 1  Discharge Goal: Independent    Roll Left and Right  Assistance Needed: Partial/moderate assistance  CARE Score: 3  Discharge Goal: Independent  Sit to Lying  Assistance Needed: Partial/moderate assistance  CARE Score: 3  Discharge Goal: Independent  Sit to Stand  Assistance Needed: Dependent  Comment: stedy  CARE Score: 1  Discharge Goal: Partial/moderate assistance  Chair/Bed-to-Chair Transfer  Assistance Needed: Dependent  Comment: steady  CARE Score: 1  Discharge Goal: Partial/moderate assistance  Toilet Transfer  Assistance Needed: Dependent  Comment: Dep x2 toilet transfer with use of Lind Se. CARE Score: 1  Discharge Goal: Partial/moderate assistance  Car Transfer  Comment: not safe to attempt at this time due to patient dependent x2 with use of stedy for sit <> stand transfers  Reason if not Attempted: Not attempted due to medical condition or safety concerns  CARE Score: 88  Discharge Goal: Partial/moderate assistance   Walk 10 Feet?   Walk 10 Feet?: No  1 Step  Reason if not Attempted: Not attempted due to medical condition or safety concerns  Picking Up Object  Comment: not safe to attempt at this time due to patient dependent x2 with use of stedy for sit <> stand transfers  Reason if not Attempted: Not attempted due to medical condition or safety concerns  CARE Score: 88  Discharge Goal: Partial/moderate assistance  Wheelchair Ability  Uses a Wheelchair and/or Scooter?: Yes  Wheel 50 Feet with Two Turns  Assistance Needed: Supervision or touching assistance  Physical Assistance Level: No physical assistance  CARE Score: 4  Discharge Goal: Independent  Wheel 150 Feet  Assistance Needed: Substantial/maximal assistance  Physical Assistance Level: 50%-74%  Comment: patient fatigued with 50 feet of w/c propulsion and required assistance to propel manual w/c remaining distance  CARE Score: 2  Discharge Goal: Independent            I      Exam:    Blood pressure 119/61, pulse 82, temperature 97.2 °F (36.2 °C), temperature source Oral, resp. rate 18, height 6' (1.829 m), weight 224 lb 6.4 oz (101.8 kg), SpO2 97 %. General: Seems more alert. Inconsistent attention. In no distress. HEENT: Neck supple. Visual fields full. MMM. Pulmonary: Symmetric air exchange without rales or rhonchi. Cardiac: RRR. Abdomen: Patient's abdomen is soft and nondistended. Bowel sounds were present throughout. There was no rebound, guarding or masses noted. Upper extremities: Strong . No new bruising. Lower extremities: No signs of DVT. Give way with strength testing across left hip and knee. Sitting balance was good. Standing balance was poor. Lab Results   Component Value Date    WBC 8.5 05/26/2017    HGB 14.6 05/26/2017    HCT 46.5 05/26/2017    MCV 88.1 05/26/2017     05/26/2017     Lab Results   Component Value Date    INR 1.06 02/09/2011    PROTIME 11.6 02/09/2011     Lab Results   Component Value Date    CREATININE 0.9 10/19/2017    BUN 12 10/19/2017     10/19/2017    K 4.5 10/19/2017     10/19/2017    CO2 26 10/19/2017     Lab Results   Component Value Date    ALT 30 08/16/2018    AST 22 08/16/2018    ALKPHOS 110 08/16/2018    BILITOT 0.3 08/16/2018       Expected length of stay  prior to a supervised level of function for discharge home with a walker and [facility] OT/PT is 1/5/2021. Recommendations:    1. Lumbar spinal stenosis with radiculopathy and gait disturbance:   Inconsistent engagement in the daily occupational and physical therapy.    He uses pain fatigue as reasons not to try activity.   Ongoing aggressive pain management, DVT prophylaxis and pulmonary hygiene. Voiding trial after the holiday. May need urology consultants to follow. Responded some to a suppository finally. He is always wearing an LSO when out of bed.  Weightbearing as tolerated per his surgeon. Crissyrenae Chuy his wound for infection.  Struggled to do a pivot transfer to the wheelchair again today. 2. DVT prophylaxis: With recent spinal surgery he has relative contraindications to chemoprophylaxis.  SCDs when in bed.  Weightbearing activities are limited but are tried daily.    No current signs of thrombosis. 3. Acute urinary retention: Cordero catheter for now.  Seems to tolerate the Flomax at night.  Voiding trial after several days of bladder rest, generalized strengthening and the Flomax. 4. Uncontrolled pain: Cryotherapy, LSO and oral analgesics.  Tolerating the scheduled Tylenol 4 times daily and have oxycodone available as needed.  Flexeril as needed. Aggressive bowel intervention while on the narcotics. 5. Uncontrolled diabetes with hyperglycemia: CCD diet.  Blood sugars will be checked at before meals and at bedtime.  Oral Metformin and sliding scale Humalog. Blood sugars generally less than 160.  6. Hypertension: Lopressor for blood pressure control.  Target systolic blood pressure is 120-140.  Vital signs are checked at rest and with activity.   7. COPD: Aggressive pulmonary hygiene.  Monitoring O2 saturations as symptoms dictate.  Albuterol inhaler has not been needed.

## 2020-12-25 LAB
GLUCOSE BLD-MCNC: 100 MG/DL (ref 70–99)
GLUCOSE BLD-MCNC: 101 MG/DL (ref 70–99)
GLUCOSE BLD-MCNC: 109 MG/DL (ref 70–99)
GLUCOSE BLD-MCNC: 128 MG/DL (ref 70–99)

## 2020-12-25 PROCEDURE — 51798 US URINE CAPACITY MEASURE: CPT

## 2020-12-25 PROCEDURE — 82962 GLUCOSE BLOOD TEST: CPT

## 2020-12-25 PROCEDURE — 94761 N-INVAS EAR/PLS OXIMETRY MLT: CPT

## 2020-12-25 PROCEDURE — 6370000000 HC RX 637 (ALT 250 FOR IP): Performed by: PHYSICAL MEDICINE & REHABILITATION

## 2020-12-25 PROCEDURE — 1280000000 HC REHAB R&B

## 2020-12-25 RX ADMIN — STANDARDIZED SENNA CONCENTRATE 8.6 MG: 8.6 TABLET ORAL at 20:54

## 2020-12-25 RX ADMIN — ACETAMINOPHEN 1000 MG: 500 TABLET ORAL at 10:17

## 2020-12-25 RX ADMIN — ACETAMINOPHEN 1000 MG: 500 TABLET ORAL at 20:54

## 2020-12-25 RX ADMIN — OXYCODONE HYDROCHLORIDE 5 MG: 5 TABLET ORAL at 02:19

## 2020-12-25 RX ADMIN — ACETAMINOPHEN 1000 MG: 500 TABLET ORAL at 14:34

## 2020-12-25 RX ADMIN — OXYCODONE HYDROCHLORIDE 10 MG: 5 TABLET ORAL at 18:27

## 2020-12-25 RX ADMIN — METOPROLOL TARTRATE 50 MG: 50 TABLET, FILM COATED ORAL at 20:53

## 2020-12-25 RX ADMIN — ASPIRIN 81 MG CHEWABLE TABLET 81 MG: 81 TABLET CHEWABLE at 10:17

## 2020-12-25 RX ADMIN — METOPROLOL TARTRATE 50 MG: 50 TABLET, FILM COATED ORAL at 10:17

## 2020-12-25 RX ADMIN — METFORMIN HYDROCHLORIDE 500 MG: 500 TABLET ORAL at 17:43

## 2020-12-25 RX ADMIN — PRAVASTATIN SODIUM 40 MG: 40 TABLET ORAL at 20:54

## 2020-12-25 ASSESSMENT — PAIN DESCRIPTION - PAIN TYPE: TYPE: SURGICAL PAIN

## 2020-12-25 ASSESSMENT — PAIN DESCRIPTION - LOCATION: LOCATION: BACK

## 2020-12-25 ASSESSMENT — PAIN SCALES - GENERAL: PAINLEVEL_OUTOF10: 5

## 2020-12-25 NOTE — PROGRESS NOTES
Marlen Shelton Sr.    : 1950  Acct #: [de-identified]  MRN: [id number]              PM&R Progress Note      Admitting diagnosis: Lumbar spinal stenosis with radiculopathy (IGC 3.9)     Comorbid diagnoses impacting rehabilitation: Uncontrolled pain, generalized weakness, gait disturbance, acute urinary retention, essential hypertension, uncontrolled diabetes type 2 with peripheral neuropathy, COPD, status post left total knee arthroplasty.     Chief complaint: Fluctuating pain. Some activities to trigger more pain but it is leg pain rather than back pain. Bowels moving some. Prior (baseline) level of function: Independent. Current level of function:         Current  IRF-[place]REIL[place] and Goals:   Hearing, Speech, and Vision  Expression of Ideas and Wants: Without difficulty  Understanding Verbal and Non-Verbal Content: Understands  Prior Functioning: Everyday Activities  Self Care: Independent  Indoor Mobility (Ambulation): Independent  Stairs: Independent  Functional Cognition: Independent  Prior Device Use: Walker    Eating  Assistance Needed: Independent  Comment: Per pt report  CARE Score: 6  Oral Hygiene  Assistance Needed: Setup or clean-up assistance  Comment: Pt performed oral care w/c level with setup. CARE Score: 5  Discharge Goal: [address]  Reason if not Attempted: Not attempted due to medical condition or safety concerns(Cordero catheter)  CARE Score: 88  Discharge Goal: Partial/moderate assistance  Shower/Bathe Self  Assistance Needed: Substantial/maximal assistance, Partial/moderate assistance  Comment: Pt performed sponge bathing w/c level with Mod A to wash anterior/posterior vikki area and distal BLE 2/2 spinal precautions. Discharge Goal: Partial/moderate assistance  Upper Body Dressing  Assistance Needed: Partial/moderate assistance  Comment: Pt doffed/donned shirt with SBA, however req Max A to don TLSO, requiring Mod A overall.   CARE Score: 3  Discharge Goal: Independent  Lower Body Dressing  Assistance Needed: Dependent  Comment: Don/doff pants Dep x2 with use of Angela Joe for STS. Pt unable to thread LE 2/2 sternal precautions. CARE Score: 1  Discharge Goal: Partial/moderate assistance  Putting On/Taking Off Footwear  Assistance Needed: Dependent  Comment: Dep to don/doff socks, pt demo poor knee extension to assist with lifting LE. CARE Score: 1  Discharge Goal: Independent    Roll Left and Right  Assistance Needed: Partial/moderate assistance  CARE Score: 3  Discharge Goal: Independent  Sit to Lying  Assistance Needed: Partial/moderate assistance  CARE Score: 3  Discharge Goal: Independent  Sit to Stand  Assistance Needed: Dependent  Comment: stedy  CARE Score: 1  Discharge Goal: Partial/moderate assistance  Chair/Bed-to-Chair Transfer  Assistance Needed: Dependent  Comment: steady  CARE Score: 1  Discharge Goal: Partial/moderate assistance  Toilet Transfer  Assistance Needed: Dependent  Comment: Dep x2 toilet transfer with use of Angela Abbe. CARE Score: 1  Discharge Goal: Partial/moderate assistance  Car Transfer  Comment: not safe to attempt at this time due to patient dependent x2 with use of stedy for sit <> stand transfers  Reason if not Attempted: Not attempted due to medical condition or safety concerns  CARE Score: 88  Discharge Goal: Partial/moderate assistance   Walk 10 Feet?   Walk 10 Feet?: No  1 Step  Reason if not Attempted: Not attempted due to medical condition or safety concerns  Picking Up Object  Comment: not safe to attempt at this time due to patient dependent x2 with use of stedy for sit <> stand transfers  Reason if not Attempted: Not attempted due to medical condition or safety concerns  CARE Score: 88  Discharge Goal: Partial/moderate assistance  Wheelchair Ability  Uses a Wheelchair and/or Scooter?: Yes  Wheel 50 Feet with Two Turns  Assistance Needed: Supervision or touching assistance  Physical Assistance Level: No physical assistance  CARE Score: 4  Discharge Goal: Independent  Wheel 150 Feet  Assistance Needed: Substantial/maximal assistance  Physical Assistance Level: 50%-74%  Comment: patient fatigued with 50 feet of w/c propulsion and required assistance to propel manual w/c remaining distance  CARE Score: 2  Discharge Goal: Independent            I      Exam:    Blood pressure 133/70, pulse 92, temperature 98.3 °F (36.8 °C), temperature source Oral, resp. rate 18, height 6' (1.829 m), weight 224 lb 6.4 oz (101.8 kg), SpO2 97 %. General: Up in a bedside chair with legs elevated. Talkative. Easily distracted, however. HEENT: No adenopathy or JVD. Speech clear. Pulmonary: Unlabored breathing without coughing. Cardiac: Regular rate and rhythm. Abdomen: Patient's abdomen is soft and nondistended. Bowel sounds were present throughout. There was no rebound, guarding or masses noted. Upper extremities: Strong . Give way with strength testing across the shoulders due to back pain. Lower extremities: No signs of DVT. Heels clear. Sitting balance was good. Standing balance was poor. Lab Results   Component Value Date    WBC 8.5 05/26/2017    HGB 14.6 05/26/2017    HCT 46.5 05/26/2017    MCV 88.1 05/26/2017     05/26/2017     Lab Results   Component Value Date    INR 1.06 02/09/2011    PROTIME 11.6 02/09/2011     Lab Results   Component Value Date    CREATININE 0.9 10/19/2017    BUN 12 10/19/2017     10/19/2017    K 4.5 10/19/2017     10/19/2017    CO2 26 10/19/2017     Lab Results   Component Value Date    ALT 30 08/16/2018    AST 22 08/16/2018    ALKPHOS 110 08/16/2018    BILITOT 0.3 08/16/2018       Expected length of stay  prior to a supervised level of function for discharge home with a walker and [facility] OT/PT is 1/5/2021. Recommendations:    1.  Lumbar spinal stenosis with radiculopathy and gait disturbance:    Continues to be inconsistent with working with the daily occupational and physical therapy.    He uses pain fatigue as reasons not to try activity.  Ongoing aggressive pain management, DVT prophylaxis and pulmonary hygiene. Voiding trial after the holiday. May need urology consultants to follow. Responded some to a suppository finally. He is always wearing an LSO when out of bed.  Weightbearing as tolerated per his surgeon. Cindy Bake his wound for infection.  Stood in a walker some today.     2. DVT prophylaxis: With recent spinal surgery he has relative contraindications to chemoprophylaxis.  SCDs when in bed.  Weightbearing activities are limited but are tried daily.    No current signs of thrombosis. 3. Acute urinary retention: Cordero catheter for now.  Seems to tolerate the Flomax at night.  Voiding trial after several days of bladder rest, generalized strengthening and the Flomax. 4. Uncontrolled pain: Cryotherapy, LSO and oral analgesics.  Tolerating the scheduled Tylenol 4 times daily and have oxycodone available as needed.  Flexeril as needed. Aggressive bowel intervention while on the narcotics. 5. Uncontrolled diabetes with hyperglycemia: CCD diet.  Blood sugars will be checked at before meals and at bedtime.  Oral Metformin and sliding scale Humalog. Blood sugars generally less than 160.  6. Hypertension: Lopressor for blood pressure control.  Target systolic blood pressure is 120-140.  Vital signs are checked at rest and with activity.   7. COPD: Aggressive pulmonary hygiene.  Monitoring O2 saturations as symptoms dictate.  Albuterol inhaler has not been needed.

## 2020-12-26 LAB
GLUCOSE BLD-MCNC: 104 MG/DL (ref 70–99)
GLUCOSE BLD-MCNC: 106 MG/DL (ref 70–99)
GLUCOSE BLD-MCNC: 106 MG/DL (ref 70–99)
GLUCOSE BLD-MCNC: 127 MG/DL (ref 70–99)
GLUCOSE BLD-MCNC: 95 MG/DL (ref 70–99)

## 2020-12-26 PROCEDURE — 97535 SELF CARE MNGMENT TRAINING: CPT

## 2020-12-26 PROCEDURE — 97542 WHEELCHAIR MNGMENT TRAINING: CPT

## 2020-12-26 PROCEDURE — 1280000000 HC REHAB R&B

## 2020-12-26 PROCEDURE — 97530 THERAPEUTIC ACTIVITIES: CPT

## 2020-12-26 PROCEDURE — 6370000000 HC RX 637 (ALT 250 FOR IP): Performed by: NURSE PRACTITIONER

## 2020-12-26 PROCEDURE — 6370000000 HC RX 637 (ALT 250 FOR IP): Performed by: PHYSICAL MEDICINE & REHABILITATION

## 2020-12-26 PROCEDURE — 97116 GAIT TRAINING THERAPY: CPT

## 2020-12-26 PROCEDURE — 94150 VITAL CAPACITY TEST: CPT

## 2020-12-26 PROCEDURE — 82962 GLUCOSE BLOOD TEST: CPT

## 2020-12-26 PROCEDURE — 97110 THERAPEUTIC EXERCISES: CPT

## 2020-12-26 PROCEDURE — 94761 N-INVAS EAR/PLS OXIMETRY MLT: CPT

## 2020-12-26 PROCEDURE — 94664 DEMO&/EVAL PT USE INHALER: CPT

## 2020-12-26 RX ADMIN — PRAVASTATIN SODIUM 40 MG: 40 TABLET ORAL at 20:54

## 2020-12-26 RX ADMIN — ACETAMINOPHEN 1000 MG: 500 TABLET ORAL at 12:54

## 2020-12-26 RX ADMIN — POLYETHYLENE GLYCOL (3350) 17 G: 17 POWDER, FOR SOLUTION ORAL at 09:43

## 2020-12-26 RX ADMIN — METOPROLOL TARTRATE 50 MG: 50 TABLET, FILM COATED ORAL at 09:43

## 2020-12-26 RX ADMIN — METFORMIN HYDROCHLORIDE 500 MG: 500 TABLET ORAL at 17:50

## 2020-12-26 RX ADMIN — DOCUSATE SODIUM 100 MG: 100 CAPSULE ORAL at 09:44

## 2020-12-26 RX ADMIN — OXYCODONE HYDROCHLORIDE 10 MG: 5 TABLET ORAL at 00:46

## 2020-12-26 RX ADMIN — OXYCODONE HYDROCHLORIDE 10 MG: 5 TABLET ORAL at 20:54

## 2020-12-26 RX ADMIN — ASPIRIN 81 MG CHEWABLE TABLET 81 MG: 81 TABLET CHEWABLE at 09:43

## 2020-12-26 RX ADMIN — STANDARDIZED SENNA CONCENTRATE 8.6 MG: 8.6 TABLET ORAL at 20:54

## 2020-12-26 RX ADMIN — OXYCODONE HYDROCHLORIDE 10 MG: 5 TABLET ORAL at 05:44

## 2020-12-26 RX ADMIN — ACETAMINOPHEN 1000 MG: 500 TABLET ORAL at 09:43

## 2020-12-26 RX ADMIN — ACETAMINOPHEN 1000 MG: 500 TABLET ORAL at 17:49

## 2020-12-26 RX ADMIN — METOPROLOL TARTRATE 50 MG: 50 TABLET, FILM COATED ORAL at 20:54

## 2020-12-26 ASSESSMENT — PAIN SCALES - GENERAL
PAINLEVEL_OUTOF10: 9
PAINLEVEL_OUTOF10: 7
PAINLEVEL_OUTOF10: 7

## 2020-12-26 NOTE — FLOWSHEET NOTE
[x] daily progress note       [] discharge       Patient Name:  Josesito Ge   :  1950 MRN: [id number]  Room:  [address] [place], [place] [address] Date of Admission: 2020  Rehabilitation Diagnosis:   Spinal stenosis, lumbar region with neurogenic claudication [M48.062]  Spinal stenosis of lumbar region with radiculopathy [M48.061, M54.16]       Date 2020       Day of ARU Week:  2   Time IN/OUT 0414-2742  [id number]   Individual Tx Minutes 60+60   TOTAL Tx Time Mins 120   Restrictions Restrictions/Precautions  Restrictions/Precautions: Weight Bearing, Fall Risk, General Precautions(B LE WBAT, back brace on when up)  Required Braces or Orthoses?: Yes  Position Activity Restriction  Spinal Precautions: No Bending, No Lifting, No Twisting  Spinal Precautions: Educated pt on precautions, pt verbalized understanding. Other position/activity restrictions: Wound vac on lumbar spine, newell catheter. Communication with other providers: [x]   OK to see per nursing:     []   Spoke with team member regarding:      Subjective observations and cognitive status: AM:Pt was sitting up in recliner at beginning of therapy session. Pt was alert and agreeable to therapy session. PM: Pt was sitting up in bed at beginning of therapy session. Pt was alert agreeable to treatment session. Pain level/location:    7/10       Location: back   Discharge recommendations  Anticipated discharge date:  tbd  Destination: []???home alone   []? ??home alone with assist PRN     []? ?? home w/ family      []? ?? Continuous supervision  []? ??SNF    []??? Assisted living     []? ?? Other:  Continued therapy: []???[facility] PT  []???OUTPATIENT  PT   []??? No Further PT  []???SNF PT  Caregiver training recommended: []? ? ? Yes  []??? No   Equipment needs: possibly 5\" wheel extensions for pt's current standard walker he received 2020; if not pt may req RW; has rollator but pt unsafe to use at this time.      Bed Mobility:           [x]   Pt received out of bed Assessment:      Treatment/Activity Tolerance:   [x] Tolerated treatment with no adverse effects    [] Patient limited by fatigue  [] Patient limited by pain   [] Patient limited by medical complications:    [] Adverse reaction to Tx:   [] Significant change in status    Safety:       [x]  bed alarm set    [x]  chair alarm set    []  Pt refused alarms                []  Telesitter activated      [x]  Gait belt used during tx session      [x]other: AM: Pt was left sitting up in recliner at end of therapy session w/ call light. PM: Pt was left sitting up in bed at end of therapy session w/ call light. Number of Minutes/Billable Intervention  Gait Training 45   Therapeutic Exercise 30   Neuro Re-Ed    Therapeutic Activity 15   Wheelchair Propulsion 30   Group    Other:    TOTAL 120         Social History  Social/Functional History  Lives With: Significant other  Type of Home: House  Home Layout: One level  Home Access: Ramped entrance  Bathroom Shower/Tub: Tub/Shower unit, Shower chair without back(Pt does not use shower chair - pt bathes in tub.)  Bathroom Toilet: Standard  Bathroom Equipment: Grab bars in [facility]: Rolling walker, Cane(Pt uses rollator at baseline, also has access to cane.)  ADL Assistance: Independent  Homemaking Assistance: (Rollator)  Homemaking Responsibilities: No(Pt's girlfriend performs IADLs)  Ambulation Assistance: Independent(Mod I with use of rollator)  Transfer Assistance: Independent  Active : No  Patient's  Info: Pt's girlfriend drives  Education: Some college  Occupation: Retired  Leisure & Hobbies: Martial arts  IADL Comments: Pt manages own medications. Additional Comments: Pt sleeps on flat bed at baseline. Pt reports no falls in the past year. Objective                                                                                    Goals:  (Update in navigator)   : Long term goals  Time Frame for Long term goals :  In 10 days:  Long term goal 1: Pt will complete all bed mobility independently  Long term goal 2: Pt will complete sit <> stand transfers with modAx1  Long term goal 3: Pt will ambulate 20 feet with modAx2 with LRAD  Long term goal 4: Pt will propel manual w/c 150 feet independently  Long term goal 5: Pt will independently complete 3 sets of 10 reps of BLE AROM exercises in available and allowed ROM:        Plan of Care                                                                              Times per week: 5 days per week for a minimum of 60 minutes/day plus group as appropriate for 60 minutes.   Treatment to include Current Treatment Recommendations: Strengthening, ROM, Balance Training, Transfer Training, ADL/Self-care Training, Functional Mobility Training, IADL Training, Neuromuscular Re-education, Safety Education & Training, Home Exercise Program, Endurance Training, Patient/Caregiver Education & Training, Equipment Evaluation, Education, & procurement, Gait Training, Pain Management, Positioning, Wheelchair Mobility Training, Stair training    Electronically signed by   Freeman Lomeli [id number]    12/26/2020, 8:22 AM

## 2020-12-26 NOTE — PROGRESS NOTES
Physical Rehabilitation: OCCUPATIONAL THERAPY     [x] daily progress note       [] discharge       Patient Name:  Christian Tucker   :  1950 MRN: [id number]  Room:  [address] [place] [address] Date of Admission: 2020  Rehabilitation Diagnosis:   Spinal stenosis, lumbar region with neurogenic claudication [M48.062]  Spinal stenosis of lumbar region with radiculopathy [M48.061, M54.16]       Date 2020       Day of ARU Week:  2   Time IN/OUT 6819-1532   Individual Tx Minutes 60   Group Tx Minutes    Co-Treat Minutes    Concurrent Tx Minutes    TOTAL Tx Time Mins 60   Variance Time    Variance Time []   Refusal due to:     []   Medical hold/reason:    []   Illness   []   Off Unit for test/procedure  []   Extra time needed to complete task  []   Therapeutic need  []   Other (specify):   Restrictions Restrictions/Precautions  Restrictions/Precautions: Weight Bearing, Fall Risk, General Precautions(B LE WBAT, back brace on when up)  Required Braces or Orthoses?: Yes  Position Activity Restriction  Spinal Precautions: No Bending, No Lifting, No Twisting  Spinal Precautions: Educated pt on precautions, pt verbalized understanding. Other position/activity restrictions: Wound vac on lumbar spine, newell catheter.    Communication with other providers: [x]   OK to see per nursing:     []   Spoke with team member regarding:      Subjective observations and cognitive status: Please see tx note      Pain level/location:   2 /10       Location: Lower back   Discharge recommendations  Anticipated discharge date:    Destination: []home alone   []home alone w assist prn [x] home w/ family    [] Continuous supervision       []SNF            [] Assisted living     [] Other:   Continued therapy: [x]HHC OT  []OUTPATIENT  OT   [] No Further OT  Equipment needs: Grab bars     Grooming:   S/U  Oral Hygiene:  S/U      Bathing:   UB: SUP / LB: CGA using long handled sponge using grab bars to stand then perform anterior/posterior pericare      Dressing:      Upper Body Dressing:  SUP pullover shirt then TLSO  Lower Body Dressing:  MOD A using reacher  Footwear: MOD A using sockaide    Toileting:   MOD A may need toilet karey for posterior pericare       Toilet Transfers:   CGA  Device Used:    [x]   Standard Toilet         []   Grab Bars           []  Bedside Commode       []   Elevated Toilet          []   Other:        Tub/Shower Transfer:   CGA using grab bars  Device Used:    [x]   Shower Bench      []   Shower Chair      []   Tub Transfer Bench           []   Bathtub    []   Shower         []   Other:         Bed Mobility:           [x]   Pt received out of bed   Rolling R/L:    Scooting:    Supine --> Sit:    Sit --> Supine:      Transfers:    Sit--> Stand:  CGA from EOB/MIN A from lower shower bench  Stand --> Sit:   CGA  Stand-Pivot:   CGA  Other:    Assistive device required for transfer:  RW      Functional Mobility:    Assistance:  CGA from recliner>toilet  Device:   [x]   Rolling Walker     []   Standard Walker []   Wheelchair        []   Bharath Beadrivera       []   4-Wheeled [facility]         []   Cardiac Walker       []   Other:        Additional Therapeutic activities/exercises completed this date:     [x]   ADL Training   [x]   Balance/Postural training     [x]   Bed/Transfer Training   [x]   Endurance Training   []   Neuromuscular Re-ed   []   Nu-step:  Time:        Level:         #Steps:       []   Rebounder:    []  Seated     []  Standing        []   Supine Ther Ex (reps/sets):     []   Seated Ther Ex (reps/sets):     []   Standing Ther Ex (reps/sets):     []   Other:      Comments:      Patient/Caregiver Education and Training:   [x]   [employer] Equipment Use  [x]   Bed Mobility/Transfer Technique/Safety  [x]   Energy Conservation Tips  []   Family training  [x]   Postural Awareness  [x]   Safety During Functional Activities  []   Reinforced Patient's Precautions   []   Progress was updated and reviewed in Rehabtracker with patient and/or family this         date. Treatment Plan for Next Session: continue in current POC      Assessment:        Treatment/Activity Tolerance:   [x] Tolerated treatment with no adverse effects    [] Patient limited by fatigue  [] Patient limited by pain   [] Patient limited by medical complications:    [] Adverse reaction to Tx:   [] Significant change in status    Safety:       [x]  bed alarm set    []  chair alarm set    []  Pt refused alarms                []  Telesitter activated      [x]  Gait belt used during tx session      []other:       Number of Minutes/Billable Intervention  Therapeutic Exercise    ADL Self-care 45   Neuro Re-Ed    Therapeutic Activity 15   Group    Other:    TOTAL 60       Social History  Social/Functional History  Lives With: Significant other  Type of Home: House  Home Layout: One level  Home Access: Ramped entrance  Bathroom Shower/Tub: Tub/Shower unit, Shower chair without back(Pt does not use shower chair - pt bathes in tub.)  Bathroom Toilet: Standard  Bathroom Equipment: Grab bars in shower  Home Equipment: Rolling walker, Cane(Pt uses rollator at baseline, also has access to cane.)  ADL Assistance: [address] [place] [address] Avenue: (Rollator)  Homemaking Responsibilities: No(Pt's girlfriend performs IADLs)  Ambulation Assistance: Independent(Mod I with use of rollator)  Transfer Assistance: Independent  Active : No  Patient's  Info: Pt's girlfriend drives  Education: Some college  Occupation: Retired  Leisure & Hobbies: Martial arts  IADL Comments: Pt manages own medications. Additional Comments: Pt sleeps on flat bed at baseline. Pt reports no falls in the past year.     Objective                                                                                    Goals:  (Update in navigator)  Short term goals  Time Frame for Short term goals: STG=LTG:  Long term goals  Time Frame for Long term goals : 10-14 days  Long term goal 1: Pt will perform all grooming tasks with Mod I.  Long term goal 2: Pt will perform sponge bathing with Min A and AE/DME. Long term goal 3: Pt will perform UB dressing with Mod I.  Long term goal 4: Pt will perform LB dressing with Min A and AE/DME. Long term goal 5: Pt will don/doff footwear with Mod I and AE. Long term goals 6: Pt will complete toileting with Min A and AE/DME. Long term goal 7: Pt will complete all functional transfers (toilet, tub, bed) with Min A and DME. Long term goal 8: Pt will participate in therex/theract with emphasis on static stance >3-5 min to increase standing tolerance and endurance needed for ADLs/IADLs. :        Plan of Care                                                                              Times per week: 5 days per week for a minimum of 60 minutes/day plus group as appropriate for 60 minutes.   Treatment to include Plan  Times per week: 5x/week  Times per day: Daily  Plan weeks: 10-14 days  Specific instructions for Next Treatment: LB AE education  Current Treatment Recommendations: Strengthening, Wheelchair Mobility Training, Pain Management, Positioning, ROM, Gait Training, Safety Education & Training, Balance Training, Stair training, Patient/Caregiver Education & Training, Self-Care / ADL, Functional Mobility Training, Equipment Evaluation, Education, & procurement, Home Management Training, Endurance Training    Electronically signed by   ANIL Chiu #[id number]. 2016 12/26/2020, 2:47 PM

## 2020-12-27 LAB
GLUCOSE BLD-MCNC: 107 MG/DL (ref 70–99)
GLUCOSE BLD-MCNC: 112 MG/DL (ref 70–99)
GLUCOSE BLD-MCNC: 112 MG/DL (ref 70–99)
GLUCOSE BLD-MCNC: 128 MG/DL (ref 70–99)
GLUCOSE BLD-MCNC: 130 MG/DL (ref 70–99)

## 2020-12-27 PROCEDURE — 6370000000 HC RX 637 (ALT 250 FOR IP): Performed by: PHYSICAL MEDICINE & REHABILITATION

## 2020-12-27 PROCEDURE — 6370000000 HC RX 637 (ALT 250 FOR IP): Performed by: NURSE PRACTITIONER

## 2020-12-27 PROCEDURE — 1280000000 HC REHAB R&B

## 2020-12-27 PROCEDURE — 99232 SBSQ HOSP IP/OBS MODERATE 35: CPT | Performed by: PHYSICAL MEDICINE & REHABILITATION

## 2020-12-27 PROCEDURE — 82962 GLUCOSE BLOOD TEST: CPT

## 2020-12-27 RX ADMIN — METOPROLOL TARTRATE 50 MG: 50 TABLET, FILM COATED ORAL at 08:18

## 2020-12-27 RX ADMIN — STANDARDIZED SENNA CONCENTRATE 8.6 MG: 8.6 TABLET ORAL at 20:15

## 2020-12-27 RX ADMIN — ACETAMINOPHEN 1000 MG: 500 TABLET ORAL at 12:53

## 2020-12-27 RX ADMIN — POLYETHYLENE GLYCOL (3350) 17 G: 17 POWDER, FOR SOLUTION ORAL at 08:19

## 2020-12-27 RX ADMIN — METFORMIN HYDROCHLORIDE 500 MG: 500 TABLET ORAL at 17:27

## 2020-12-27 RX ADMIN — PRAVASTATIN SODIUM 40 MG: 40 TABLET ORAL at 20:15

## 2020-12-27 RX ADMIN — CYCLOBENZAPRINE 10 MG: 10 TABLET, FILM COATED ORAL at 20:15

## 2020-12-27 RX ADMIN — ASPIRIN 81 MG CHEWABLE TABLET 81 MG: 81 TABLET CHEWABLE at 08:18

## 2020-12-27 RX ADMIN — OXYCODONE HYDROCHLORIDE 5 MG: 5 TABLET ORAL at 08:19

## 2020-12-27 RX ADMIN — ACETAMINOPHEN 1000 MG: 500 TABLET ORAL at 20:15

## 2020-12-27 RX ADMIN — ACETAMINOPHEN 1000 MG: 500 TABLET ORAL at 17:27

## 2020-12-27 RX ADMIN — OXYCODONE HYDROCHLORIDE 10 MG: 5 TABLET ORAL at 01:02

## 2020-12-27 RX ADMIN — METOPROLOL TARTRATE 50 MG: 50 TABLET, FILM COATED ORAL at 20:15

## 2020-12-27 RX ADMIN — ACETAMINOPHEN 1000 MG: 500 TABLET ORAL at 08:19

## 2020-12-27 RX ADMIN — DOCUSATE SODIUM 100 MG: 100 CAPSULE ORAL at 08:19

## 2020-12-27 ASSESSMENT — PAIN SCALES - GENERAL
PAINLEVEL_OUTOF10: 7
PAINLEVEL_OUTOF10: 7
PAINLEVEL_OUTOF10: 10
PAINLEVEL_OUTOF10: 7
PAINLEVEL_OUTOF10: 2
PAINLEVEL_OUTOF10: 8

## 2020-12-27 ASSESSMENT — PAIN DESCRIPTION - ONSET: ONSET: GRADUAL

## 2020-12-27 ASSESSMENT — PAIN DESCRIPTION - FREQUENCY: FREQUENCY: INTERMITTENT

## 2020-12-27 ASSESSMENT — PAIN DESCRIPTION - ORIENTATION: ORIENTATION: LOWER

## 2020-12-28 LAB
GLUCOSE BLD-MCNC: 101 MG/DL (ref 70–99)
GLUCOSE BLD-MCNC: 113 MG/DL (ref 70–99)
GLUCOSE BLD-MCNC: 116 MG/DL (ref 70–99)
GLUCOSE BLD-MCNC: 94 MG/DL (ref 70–99)

## 2020-12-28 PROCEDURE — 97530 THERAPEUTIC ACTIVITIES: CPT

## 2020-12-28 PROCEDURE — 1280000000 HC REHAB R&B

## 2020-12-28 PROCEDURE — 82962 GLUCOSE BLOOD TEST: CPT

## 2020-12-28 PROCEDURE — 97110 THERAPEUTIC EXERCISES: CPT

## 2020-12-28 PROCEDURE — 99232 SBSQ HOSP IP/OBS MODERATE 35: CPT | Performed by: PHYSICAL MEDICINE & REHABILITATION

## 2020-12-28 PROCEDURE — 97112 NEUROMUSCULAR REEDUCATION: CPT

## 2020-12-28 PROCEDURE — 97116 GAIT TRAINING THERAPY: CPT

## 2020-12-28 PROCEDURE — 6370000000 HC RX 637 (ALT 250 FOR IP): Performed by: PHYSICAL MEDICINE & REHABILITATION

## 2020-12-28 RX ADMIN — ACETAMINOPHEN 1000 MG: 500 TABLET ORAL at 20:18

## 2020-12-28 RX ADMIN — ACETAMINOPHEN 1000 MG: 500 TABLET ORAL at 17:41

## 2020-12-28 RX ADMIN — ACETAMINOPHEN 1000 MG: 500 TABLET ORAL at 08:21

## 2020-12-28 RX ADMIN — ACETAMINOPHEN 1000 MG: 500 TABLET ORAL at 12:14

## 2020-12-28 RX ADMIN — STANDARDIZED SENNA CONCENTRATE 8.6 MG: 8.6 TABLET ORAL at 20:18

## 2020-12-28 RX ADMIN — DOCUSATE SODIUM 100 MG: 100 CAPSULE ORAL at 08:19

## 2020-12-28 RX ADMIN — METFORMIN HYDROCHLORIDE 500 MG: 500 TABLET ORAL at 17:41

## 2020-12-28 RX ADMIN — OXYCODONE HYDROCHLORIDE 10 MG: 5 TABLET ORAL at 08:18

## 2020-12-28 RX ADMIN — METOPROLOL TARTRATE 50 MG: 50 TABLET, FILM COATED ORAL at 08:18

## 2020-12-28 RX ADMIN — PRAVASTATIN SODIUM 40 MG: 40 TABLET ORAL at 20:18

## 2020-12-28 RX ADMIN — OXYCODONE HYDROCHLORIDE 10 MG: 5 TABLET ORAL at 01:51

## 2020-12-28 RX ADMIN — METOPROLOL TARTRATE 50 MG: 50 TABLET, FILM COATED ORAL at 20:18

## 2020-12-28 RX ADMIN — OXYCODONE HYDROCHLORIDE 10 MG: 5 TABLET ORAL at 20:18

## 2020-12-28 RX ADMIN — ASPIRIN 81 MG CHEWABLE TABLET 81 MG: 81 TABLET CHEWABLE at 08:18

## 2020-12-28 ASSESSMENT — PAIN SCALES - GENERAL
PAINLEVEL_OUTOF10: 5
PAINLEVEL_OUTOF10: 6
PAINLEVEL_OUTOF10: 8

## 2020-12-28 ASSESSMENT — PAIN DESCRIPTION - ONSET: ONSET: GRADUAL

## 2020-12-28 ASSESSMENT — PAIN DESCRIPTION - PAIN TYPE: TYPE: SURGICAL PAIN

## 2020-12-28 ASSESSMENT — PAIN DESCRIPTION - DESCRIPTORS: DESCRIPTORS: SHOOTING

## 2020-12-28 ASSESSMENT — PAIN DESCRIPTION - PROGRESSION: CLINICAL_PROGRESSION: GRADUALLY IMPROVING

## 2020-12-28 NOTE — PROGRESS NOTES
Tomas Samira .    : 1950  Acct #: [de-identified]  MRN: [id number]              PM&R Progress Note      Admitting diagnosis: Lumbar spinal stenosis with radiculopathy (IGC 3.9)     Comorbid diagnoses impacting rehabilitation: Uncontrolled pain, generalized weakness, gait disturbance, acute urinary retention, essential hypertension, uncontrolled diabetes type 2 with peripheral neuropathy, COPD, status post left total knee arthroplasty. Chief complaint: Alert. Less back pain. Still with shooting pains in the left leg. Slept better. Prior (baseline) level of function: Independent. Current level of function:         Current  IRF-MIREILLE and Goals:   Hearing, Speech, and Vision  Expression of Ideas and Wants: Without difficulty  Understanding Verbal and Non-Verbal Content: Understands  Prior Functioning: Everyday Activities  Self Care: Independent  Indoor Mobility (Ambulation): Independent  Stairs: Independent  Functional Cognition: Independent  Prior Device Use: Walker    Eating  Assistance Needed: Independent  Comment: Per pt report  CARE Score: 6  Oral Hygiene  Assistance Needed: Setup or clean-up assistance  Comment: Pt performed oral care w/c level with setup. CARE Score: 5  Discharge Goal: Independent  Toileting Hygiene  Assistance Needed: Dependent  Reason if not Attempted: Not attempted due to medical condition or safety concerns(Cordero catheter)  CARE Score: 1  Discharge Goal: Partial/moderate assistance  Shower/Bathe Self  Assistance Needed: Substantial/maximal assistance, Partial/moderate assistance  Comment: Pt performed sponge bathing w/c level with Mod A to wash anterior/posterior vikki area and distal BLE 2/2 spinal precautions. Discharge Goal: Partial/moderate assistance  Upper Body Dressing  Assistance Needed: Partial/moderate assistance  Comment: Pt doffed/donned shirt with SBA, however req Max A to don TLSO, requiring Mod A overall.   CARE Score: 3  Discharge Goal: Independent  Lower 4  Discharge Goal: Independent  Wheel 150 Feet  Assistance Needed: Substantial/maximal assistance  Physical Assistance Level: 50%-74%  Comment: patient fatigued with 50 feet of w/c propulsion and required assistance to propel manual w/c remaining distance  CARE Score: 2  Discharge Goal: Independent            I      Exam:    Blood pressure 124/76, pulse 107, temperature 98.7 °F (37.1 °C), resp. rate 16, height 6' (1.829 m), weight 220 lb (99.8 kg), SpO2 98 %. General: Up in a bedside chair with legs elevated. In no distress. LSO in place. HEENT: MMM. Neck supple. Clear speech. Pulmonary: Unlabored without coughing. Cardiac: RRR. Abdomen: Patient's abdomen is soft and nondistended. Bowel sounds were present throughout. There was no rebound, guarding or masses noted. Upper extremities: Able to bring both hands up overhead. Strong . Diminished sensation in the fingertips. Lower extremities: Guarded movements of the left hip and knee. No signs of DVT. Weak ankle dorsiflexion bilaterally. Sitting balance was good. Standing balance was poor. Lab Results   Component Value Date    WBC 8.5 05/26/2017    HGB 14.6 05/26/2017    HCT 46.5 05/26/2017    MCV 88.1 05/26/2017     05/26/2017     Lab Results   Component Value Date    INR 1.06 02/09/2011    PROTIME 11.6 02/09/2011     Lab Results   Component Value Date    CREATININE 0.9 10/19/2017    BUN 12 10/19/2017     10/19/2017    K 4.5 10/19/2017     10/19/2017    CO2 26 10/19/2017     Lab Results   Component Value Date    ALT 30 08/16/2018    AST 22 08/16/2018    ALKPHOS 110 08/16/2018    BILITOT 0.3 08/16/2018       Expected length of stay  prior to a supervised level of function for discharge home with a walker and [facility] OT/PT is 1/5/2021. Recommendations:    1. Lumbar spinal stenosis with radiculopathy and gait disturbance:  Excellent participation in the daily occupational and physical therapy.   Still has symptoms from his radicular injury. Tolerating the pain management, DVT prophylaxis and pulmonary hygiene.    Voiding well after removal of his Cordero (on Flomax).    Using an LSO when out of bed.  Weightbearing as tolerated per his surgeon. No signs of wound infection. Stand pivot transfers to and from wheelchair today. 2. DVT prophylaxis: With recent spinal surgery he has relative contraindications to chemoprophylaxis.    Using SCDs when in bed.  Weightbearing activities are pursued daily.  Monitoring him for signs of thrombosis. 3. Acute urinary retention: Cordero catheter was removed and his voiding trial has been successful. Continue Flomax. 4. Uncontrolled pain: Cryotherapy, LSO and oral analgesics. Benefiting from the scheduled Tylenol 4 times daily and have oxycodone available as needed.  Flexeril as needed. Aggressive bowel intervention while on the narcotics. 5. Uncontrolled diabetes with hyperglycemia: CCD diet.  Blood sugars are checked at before meals and at bedtime.  Oral Metformin and sliding scale Humalog. Recent blood sugars are consistently less than 150. I will change the frequency of blood sugar checks to twice daily. 6. Hypertension: Lopressor for blood pressure control.  Target systolic blood pressure is 120-140.  Vital signs are checked at rest and with activity. Blood pressure in target range recently. 7.  COPD: Aggressive pulmonary hygiene.  Monitoring O2 saturations as symptoms dictate.  Albuterol inhaler as needed.

## 2020-12-28 NOTE — PROGRESS NOTES
Occupational Therapy  Physical Rehabilitation: OCCUPATIONAL THERAPY     [x] daily progress note       [] discharge       Patient Name:  Christian Tucker   :  1950 MRN: [id number]  Room:  [address] [place] [address] Date of Admission: 2020  Rehabilitation Diagnosis:   Spinal stenosis, lumbar region with neurogenic claudication [M48.062]  Spinal stenosis of lumbar region with radiculopathy [M48.061, M54.16]       Date 2020       Day of ARU Week:  4   Time IN/OUT 0938/1008  1258/1358   Individual Tx Minutes 30+60   Group Tx Minutes    Co-Treat Minutes    Concurrent Tx Minutes    TOTAL Tx Time Mins 90   Variance Time    Variance Time []   Refusal due to:     []   Medical hold/reason:    []   Illness   []   Off Unit for test/procedure  []   Extra time needed to complete task  []   Therapeutic need  []   Other (specify):   Restrictions Restrictions/Precautions: Weight Bearing, Fall Risk, General Precautions(B LE WBAT, back brace on when up)         Communication with other providers: [x]   OK to see per nursing:     []   Spoke with team member regarding:      Subjective observations and cognitive status: Pt was pleasant and agreeable to therapy   Pain level/location:   0 /10       Location: none    Discharge recommendations  Anticipated discharge date:    Destination: []home alone   []home alone w assist prn   [] home w/ family    [] Continuous supervision       []SNF    [] Assisted living     [] Other:   Continued therapy: []HHC OT  []OUTPATIENT  OT   [] No Further OT  Equipment needs: TBD     Toileting:  Denies need       Toilet Transfers:   NA   Device Used:    []   Standard Toilet         []   Grab Bars           []  Bedside Commode       []   Elevated Toilet          []   Other:        Bed Mobility:           []   Pt received out of bed   Rolling R/L:  NA  Scooting:  Sup to scoot to EOB   Supine --> Sit:  SUP   Sit --> Supine:  SUP     Transfers:    Sit--> Stand:  SBA   Stand --> Sit:   CGA with education and heavy cueing for slow, controlled descent   Stand-Pivot:   NA   Other:    Assistive device required for transfer:   RW      Functional Mobility:  56ft c 1 turn + 113ft c 2 turns +82ft   Assistance:  SBA/CGA  Device:   [x]   Rolling Walker     []   Standard Walker []   Wheelchair        []   Priyanka Rodaser       []   4-Wheeled Vero Garduno         []   Cardiac Walker       []   Other:            Additional Therapeutic activities/exercises completed this date:     []   ADL Training   [x]   Balance/Postural training     []   Bed/Transfer Training   [x]   Endurance Training   []   Neuromuscular Re-ed   []   Nu-step:  Time:        Level:         #Steps:       []   Rebounder:    []  Seated     []  Standing        []   Supine Ther Ex (reps/sets):     [x]   Seated Ther Ex (reps/sets):  BUE exercise, 2 sets of 15 with 3# dowel fernando in all planes sitting EOB to increase strength and endurance needed ADLs. []   Standing Ther Ex (reps/sets):     []   Other:      Comments:  Pt engaged in dynamic standing balance activity at [facility], reaching outside base of support with cueing to maintain upright posture. Pt able tolerate ~4:17 mins; relying heavy on walker, and ~3:39 mins. Patient/Caregiver Education and Training:   []   [employer] Equipment Use  []   Bed Mobility/Transfer Technique/Safety  [x]   Energy Conservation Tips  []   Family training  []   Postural Awareness  []   Safety During Functional Activities  []   Reinforced Patient's Precautions   []   Progress was updated and reviewed in Rehabtracker with patient and/or family this         date.     Treatment Plan for Next Session: Continued therapy as tolerated       Assessment:        Treatment/Activity Tolerance:   [x] Tolerated treatment with no adverse effects    [] Patient limited by fatigue  [] Patient limited by pain   [] Patient limited by medical complications:    [] Adverse reaction to Tx:   [] Significant change in status    Safety:       []  bed alarm set    []  chair alarm set    []  Pt refused alarms                []  Telesitter activated      [x]  Gait belt used during tx session      []other:       Number of Minutes/Billable Intervention  Therapeutic Exercise 30   ADL Self-care    Neuro Re-Ed    Therapeutic Activity 60   Group    Other:    TOTAL 90       Social History  Social/Functional History  Lives With: Significant other  Type of Home: House  Home Layout: One level  Home Access: Ramped entrance  Bathroom Shower/Tub: Tub/Shower unit, Shower chair without back(Pt does not use shower chair - pt bathes in tub.)  Bathroom Toilet: Standard  Bathroom Equipment: Grab bars in [address] [place] [address] [place]: Rolling walker, Cane(Pt uses rollator at baseline, also has access to cane.)  ADL Assistance: [facility] [address]: (Rollator)  Homemaking Responsibilities: No(Pt's girlfriend performs IADLs)  Ambulation Assistance: Independent(Mod I with use of rollator)  Transfer Assistance: Independent  Active : No  Patient's  Info: Pt's girlfriend drives  Education: Some college  Occupation: Retired  Leisure & Hobbies: Martial arts  IADL Comments: Pt manages own medications. Additional Comments: Pt sleeps on flat bed at baseline. Pt reports no falls in the past year. Objective                                                                                    Goals:  (Update in navigator)  Short term goals  Time Frame for Short term goals: STG=LTG:  Long term goals  Time Frame for Long term goals : 10-14 days  Long term goal 1: Pt will perform all grooming tasks with Mod I.  Long term goal 2: Pt will perform sponge bathing with Min A and AE/DME. Long term goal 3: Pt will perform UB dressing with Mod I.  Long term goal 4: Pt will perform LB dressing with Min A and AE/DME. Long term goal 5: Pt will don/doff footwear with Mod I and AE. Long term goals 6: Pt will complete toileting with Min A and AE/DME.   Long term goal 7: Pt will complete all functional transfers (toilet, tub, bed) with Min ARIA and DME. Long term goal 8: Pt will participate in therex/theract with emphasis on static stance >3-5 min to increase standing tolerance and endurance needed for ADLs/IADLs. :        Plan of Care                                                                              Times per week: 5 days per week for a minimum of 60 minutes/day plus group as appropriate for 60 minutes.   Treatment to include Plan  Times per week: 5x/week  Times per day: Daily  Plan weeks: 10-14 days  Specific instructions for Next Treatment: LB AE education  Current Treatment Recommendations: Strengthening, Wheelchair Mobility Training, Pain Management, Positioning, ROM, Gait Training, Safety Education & Training, Balance Training, Stair training, Patient/Caregiver Education & Training, Self-Care / ADL, Functional Mobility Training, Equipment Evaluation, Education, & procurement, Home Management Training, Endurance Training    Electronically signed by   SYLVIE Ontiveros  12/28/2020, 10:19 AM

## 2020-12-28 NOTE — PROGRESS NOTES
Physical Therapy      [x] daily progress note       [] discharge       Patient Name:  Jose Calvo   :  1950 MRN: [id number]  Room:  [address] [place], [place] [address] Date of Admission: 2020  Rehabilitation Diagnosis:   Spinal stenosis, lumbar region with neurogenic claudication [M48.062]  Spinal stenosis of lumbar region with radiculopathy [M48.061, M54.16]       Date 2020       Day of ARU Week:  4   Time IN/OUT 9421-6589   Individual Tx Minutes 60   TOTAL Tx Time Mins 60   Variance Time    Variance Time []   Refusal due to:     []   Medical hold/reason:    []   Illness   []   Off Unit for test/procedure  []   Extra time needed to complete task  []   Therapeutic need  []   Other (specify):   Restrictions Restrictions/Precautions  Restrictions/Precautions: Weight Bearing, Fall Risk, General Precautions(B LE WBAT, back brace on when up)  Required Braces or Orthoses?: Yes  Position Activity Restriction  Spinal Precautions: No Bending, No Lifting, No Twisting  Spinal Precautions: Educated pt on precautions, pt verbalized understanding. Other position/activity restrictions: Wound vac on lumbar spine, newell catheter. Communication with other providers: [x]   OK to see per nursing:     []   Spoke with team member regarding:      Subjective observations and cognitive status: Pt up in [facility] just finished OT session with c/o fatigue but willing to participate. Pt with decreased memory, decreased safety awareness, and decreased problem solving req cueing during session during tasks. Pain level/location: 0/10       Location:    Discharge recommendations  Anticipated discharge date:    Destination: []???home alone   []? ??home alone with assist PRN     [x]? ?? home w/ friend Alexia Beckett      [x]? ?? Continuous supervision  []? ??SNF    []??? Assisted living     []? ?? Other:  Continued therapy: [x]? ? ? C PT  []???OUTPATIENT  PT   []??? No Further PT  []???SNF PT  Caregiver training recommended: []? ? ? Yes  []??? No   Equipment needs: RW and manual WC  Alicia Osborn Sr. requires the assistance of a wheeled walker to successfully ambulate from room to room at home to allow completion of daily living tasks such as: bathing, toileting, dressing and grooming. A wheeled walker is necessary due to the patient's unsteady gait, upper body weakness, inability to  a standard walker. This patient can ambulate only by pushing a walker instead of using a lesser assistive device such as a cane or crutch. Radha Bernal requires a standard wheelchair to successfully complete daily living tasks such as: bathing, toileting, dressing and grooming; or any other daily living task in the home. A standard manual wheelchair is necessary due to patient's impaired ambulation and mobility restrictions and would be unable to resolve these daily living tasks using a cane or walker. The patient is capable of using/self-propelling a standard wheelchair safely in their home and can maneuver within their home with adequate access in a reasonable amount of time. The patient has not expressed an unwillingness to use the wheelchair. Having this wheelchair would enable Alicia Osborn Sr. ability to regularly participate in the above mentioned daily living tasks around the home. There is a caregiver available to provide necessary assistance. Expected length of need is lifetime. Radha Bernal does not have any infectious diagnoses. Alicia Osborn Sr. can self propel a wheelchair and needs anti-rollback device to propel up ramps. Bed Mobility:           []   Pt received out of bed   Sit --> Supine:  SBA    Transfers:    Sit--> Stand:  CGA  Stand --> Sit:   CGA  Stand-Pivot:   CGA  Assistive device required for transfer:   RW    Wheelchair Propulsion:  Distance:   95'   Assistance:   Supervision  Extremities Used: B LEs only with focus on quad strengthening, slow pace, cues for obstacle management, extra time to complete distance. Curb       Assistance:    CG-Min A, mod seq cues  Supportive Device:  // bars 2 trials x 2  Height:   2\"    Additional Therapeutic activities/exercises completed this date:     [x]   Nu-step:  Time: 5'       Level: 4 using LE's only        #Steps: 170  Pt at max fatigue, unable to increase time today     []   Rebounder:    []  Seated     []  Standing        []   Balance training         []   Postural training    [x]   Supine ther ex (reps/sets): DLS handout provided with education provided regarding abdominal bracing technique, and stage 1 abdominal crunches. Pt able to perform 10 reps each with good tolerance, min cues for technique using handout. [x]   Seated ther ex (reps/sets): B LE 10 x 2 for hip flexion, LAQ( ~ 15 degrees extensor lag bilaterally), hip abd/add AROM. []   Standing ther ex (reps/sets):     []   Picked up object from floor                       []   Reacher used   []   Other:   []   Other:   []   Other:    Patient/Caregiver Education and Training:   [x]   Bed Mobility/Transfer technique/safety  [x]   Gait technique/sequencing  [x]   Proper use of assistive device  [x]   Advanced mobility safety and technique  []   Reinforced patient's precautions with mobility/functional tasks  [x]   Postural awareness  []   Family training  [x]   Progress was updated and reviewed in Rehabtracker with patient and/or family this date.     Treatment Plan for Next Session: gait progression/safety, standing balance/endurance; core and quad strengthening, carpet, object retrieval      Treatment/Activity Tolerance:   [x] Tolerated treatment with no adverse effects    [x] Patient limited by fatigue  [] Patient limited by pain   [] Patient limited by medical complications:    [] Adverse reaction to Tx:   [] Significant change in status    Safety:       [x]  bed alarm set    []  chair alarm set    []  Pt refused alarms                []  Telesitter activated      [x]  Gait belt used during tx session []other:         Number of Minutes/Billable Intervention  Gait Training 15   Therapeutic Exercise 35   Neuro Re-Ed    Therapeutic Activity 10   Wheelchair Propulsion    Group    Other:    TOTAL 60         Social History  Social/Functional History  Lives With: Significant other  Type of Home: House  Home Layout: One level  Home Access: Ramped entrance  Bathroom Shower/Tub: Tub/Shower unit, Shower chair without back(Pt does not use shower chair - pt bathes in tub.)  Bathroom Toilet: Standard  Bathroom Equipment: Grab bars in [address] [place]: Rolling walker, Cane(Pt uses rollator at baseline, also has access to cane.)  ADL Assistance: Independent  Homemaking Assistance: (Rollator)  Homemaking Responsibilities: No(Pt's girlfriend performs IADLs)  Ambulation Assistance: Independent(Mod I with use of rollator)  Transfer Assistance: Independent  Active : No  Patient's  Info: Pt's girlfriend drives  Education: Some college  Occupation: Retired  Leisure & Hobbies: Martial arts  IADL Comments: Pt manages own medications. Additional Comments: Pt sleeps on flat bed at baseline. Pt reports no falls in the past year. Objective                                                                                    Goals:  (Update in navigator)   : Long term goals  Time Frame for Long term goals : In 10 days:  Long term goal 1: Pt will complete all bed mobility independently  Long term goal 2: Pt will complete sit <> stand transfers with modAx1  Long term goal 3: Pt will ambulate 20 feet with modAx2 with LRAD  Long term goal 4: Pt will propel manual w/c 150 feet independently  Long term goal 5: Pt will independently complete 3 sets of 10 reps of BLE AROM exercises in available and allowed ROM:        Plan of Care                                                                              Times per week: 5 days per week for a minimum of 60 minutes/day plus group as appropriate for 60 minutes.   Treatment to include Current Treatment Recommendations: Strengthening, ROM, Balance Training, Transfer Training, ADL/Self-care Training, Functional Mobility Training, IADL Training, Neuromuscular Re-education, Safety Education & Training, Home Exercise Program, Endurance Training, Patient/Caregiver Education & Training, Equipment Evaluation, Education, & procurement, Gait Training, Pain Management, Positioning, Wheelchair Mobility Training, Stair training    Electronically signed by   Kacie Dubois PTA #[id number]  12/28/2020, 9:58 AM

## 2020-12-28 NOTE — PROGRESS NOTES
Jr Ramirezjose Hurtado.    : 1950  Acct #: [de-identified]  MRN: [id number]              PM&R Progress Note      Admitting diagnosis: Lumbar spinal stenosis with radiculopathy ([address])     Comorbid diagnoses impacting rehabilitation: Uncontrolled pain, generalized weakness, gait disturbance, acute urinary retention, essential hypertension, uncontrolled diabetes type 2 with peripheral neuropathy, COPD, status post left total knee arthroplasty. Chief complaint: Still having intermittent shooting pains into the left leg from the pelvis. Prior (baseline) level of function: Independent. Current level of function:         Current  IRF-MIREILLE and Goals:   Hearing, Speech, and Vision  Expression of Ideas and Wants: Without difficulty  Understanding Verbal and Non-Verbal Content: Understands  Prior Functioning: Everyday Activities  Self Care: Independent  Indoor Mobility (Ambulation): Independent  Stairs: Independent  Functional Cognition: Independent  Prior Device Use: Walker    Eating  Assistance Needed: Independent  Comment: Per pt report  CARE Score: 6  Oral Hygiene  Assistance Needed: Setup or clean-up assistance  Comment: Pt performed oral care w/c level with setup. CARE Score: 5  Discharge Goal: Independent  Toileting Hygiene  Assistance Needed: Dependent  Reason if not Attempted: Not attempted due to medical condition or safety concerns(Cordero catheter)  CARE Score: 1  Discharge Goal: Partial/moderate assistance  Shower/Bathe Self  Assistance Needed: Substantial/maximal assistance, Partial/moderate assistance  Comment: Pt performed sponge bathing w/c level with Mod A to wash anterior/posterior vikki area and distal BLE 2/2 spinal precautions. Discharge Goal: Partial/moderate assistance  Upper Body Dressing  Assistance Needed: Partial/moderate assistance  Comment: Pt doffed/donned shirt with SBA, however req Max A to don TLSO, requiring Mod A overall.   CARE Score: 3  Discharge Goal: Independent  Lower Body Dressing  Assistance Needed: Dependent  Comment: Don/doff pants Dep x2 with use of Ulis Hones for STS. Pt unable to thread LE 2/2 sternal precautions. CARE Score: 1  Discharge Goal: Partial/moderate assistance  Putting On/Taking Off Footwear  Assistance Needed: Dependent  Comment: Dep to don/doff socks, pt demo poor knee extension to assist with lifting LE. CARE Score: 1  Discharge Goal: Independent    Roll Left and Right  Assistance Needed: Partial/moderate assistance  CARE Score: 3  Discharge Goal: Independent  Sit to Lying  Assistance Needed: Partial/moderate assistance  CARE Score: 3  Discharge Goal: Independent  Sit to Stand  Assistance Needed: Dependent  Comment: stedy  CARE Score: 1  Discharge Goal: Partial/moderate assistance  Chair/Bed-to-Chair Transfer  Assistance Needed: Dependent  Comment: steady  CARE Score: 1  Discharge Goal: Partial/moderate assistance  Toilet Transfer  Assistance Needed: Dependent  Comment: Dep x2 toilet transfer with use of Ulis Hones. CARE Score: 1  Discharge Goal: Partial/moderate assistance  Car Transfer  Comment: not safe to attempt at this time due to patient dependent x2 with use of stedy for sit <> stand transfers  Reason if not Attempted: Not attempted due to medical condition or safety concerns  CARE Score: 88  Discharge Goal: Partial/moderate assistance   Walk 10 Feet?   Walk 10 Feet?: No  1 Step  Reason if not Attempted: Not attempted due to medical condition or safety concerns  Picking Up Object  Comment: not safe to attempt at this time due to patient dependent x2 with use of stedy for sit <> stand transfers  Reason if not Attempted: Not attempted due to medical condition or safety concerns  CARE Score: 88  Discharge Goal: Partial/moderate assistance  Wheelchair Ability  Uses a Wheelchair and/or Scooter?: Yes  Wheel 50 Feet with Two Turns  Assistance Needed: Supervision or touching assistance  Physical Assistance Level: No physical assistance  CARE Score: 4  Discharge Goal: Independent  Wheel 150 Feet  Assistance Needed: Substantial/maximal assistance  Physical Assistance Level: 50%-74%  Comment: patient fatigued with 50 feet of w/c propulsion and required assistance to propel manual w/c remaining distance  CARE Score: 2  Discharge Goal: Independent            I      Exam:    Blood pressure 110/68, pulse 74, temperature 98 °F (36.7 °C), temperature source Oral, resp. rate 18, height 6' (1.829 m), weight 224 lb 6.4 oz (101.8 kg), SpO2 96 %. General: Semirecumbent in bed. Alert and talkative. In good spirits. HEENT: MMM. Neck supple. Speech clear. Pulmonary: Unlabored and clear. Cardiac: RRR. Abdomen: Patient's abdomen is soft and nondistended. Bowel sounds were present throughout. There was no rebound, guarding or masses noted. Upper extremities: Slow and deliberate with arm movements with a strong . Lower extremities: Left thigh slightly tender to palpation. Calves are soft. Increased tone across the right knee. Sitting balance was good. Standing balance was poor. Lab Results   Component Value Date    WBC 8.5 05/26/2017    HGB 14.6 05/26/2017    HCT 46.5 05/26/2017    MCV 88.1 05/26/2017     05/26/2017     Lab Results   Component Value Date    INR 1.06 02/09/2011    PROTIME 11.6 02/09/2011     Lab Results   Component Value Date    CREATININE 0.9 10/19/2017    BUN 12 10/19/2017     10/19/2017    K 4.5 10/19/2017     10/19/2017    CO2 26 10/19/2017     Lab Results   Component Value Date    ALT 30 08/16/2018    AST 22 08/16/2018    ALKPHOS 110 08/16/2018    BILITOT 0.3 08/16/2018       Expected length of stay  prior to a supervised level of function for discharge home with a walker and [place] [facility] OT/PT is 1/5/2021. Recommendations:    1. Lumbar spinal stenosis with radiculopathy and gait disturbance: He has shown distinct benefit from participating in the daily occupational and physical therapy.   Still has symptoms from his

## 2020-12-28 NOTE — PATIENT CARE CONFERENCE
ACUTE REHAB TEAM CONFERENCE SUMMARY   [facility]    NAME: Jorge Styles Sr.  : 1950 ADMIT DATE: 2020    Rehab Admitting Dx: Spinal stenosis, lumbar region with neurogenic claudication [M48.062]  Spinal stenosis of lumbar region with radiculopathy [N43.019, M54.16]  Patient Comorbid Conditions: Active Hospital Problems    Diagnosis Date Noted    Spinal stenosis of lumbar region with radiculopathy [M48.061, M54.16] 2020    Generalized weakness [R53.1]     Uncontrolled pain [R52]     Gait disturbance [R26.9]     Acute urinary retention [R33.8]     Essential hypertension [I10]     Uncontrolled type 2 diabetes mellitus with peripheral neuropathy (HCC) [E11.42, E11.65]     Simple chronic bronchitis (HCC) [J41.0]     Status post left knee replacement [Z96.652]      Date: 2020    CASE MANAGEMENT  Current issues/needs regarding patient and family discharge status: Patient lives in 1L home, 0 [address] with ramp with friend Inell Jose. Plan is to discharge home with Inell Jose. Current recommendation is for a RW, possibly a manual wheelchair, and C PT, OT, and Nursing. PHYSICAL THERAPY         Long term goals  Time Frame for Long term goals :  In 10 days:  Long term goal 1: Pt will complete all bed mobility independentlypart met: Supervision to mod I  Long term goal 2: Pt will complete sit <> stand transfers with [id number]: variable min assist to CG dependent on fatigue  Long term goal 3: Pt will ambulate 20 feet with modAx2 with LRADMET:variable distance due to fatigue from ' with CGA and heavy reliance on UEs   Long term goal 4: Pt will propel manual w/c 150 feet independentlyMET: 200' mod I  Long term goal 5: Pt will independently complete 3 sets of 10 reps of BLE AROM exercises in available and allowed ROM ongoing   Impairments/deficits, barriers:          Prognosis: Good  Decision Making: High Complexity  Clinical Presentation: unpredictable characteristics  Equipment needed at discharge: 2ww, w/c      PT IRF-MIREILLE scores and goals since initial assessment:   Roll Left and Right  Assistance Needed: Partial/moderate assistance  CARE Score: 3  Discharge Goal: Independent  Sit to Lying  Assistance Needed: Partial/moderate assistance  CARE Score: 3  Discharge Goal: Independent  Lying to Sitting on Side of Bed  Assistance Needed: Partial/moderate assistance  Physical Assistance Level: Less than 25%  CARE Score: 3  Discharge Goal: Independent  Sit to Stand  Assistance Needed: Dependent  Comment: stedy  CARE Score: 1  Discharge Goal: Partial/moderate assistance  Chair/Bed-to-Chair Transfer  Assistance Needed: Dependent  Comment: steady  CARE Score: 1  Discharge Goal: Partial/moderate assistance  Toilet Transfer  Assistance Needed: Dependent  Comment: Dep x2 toilet transfer with use of Marlen [place]. CARE Score: 1  Discharge Goal: Partial/moderate assistance  Car Transfer  Comment: not safe to attempt at this time due to patient dependent x2 with use of stedy for sit <> stand transfers  Reason if not Attempted: Not attempted due to medical condition or safety concerns  CARE Score: 88  Discharge Goal: Partial/moderate assistance  Walk 10 Feet?   Walk 10 Feet?: No  1 Step  Reason if not Attempted: Not attempted due to medical condition or safety concerns  Picking Up Object  Comment: not safe to attempt at this time due to patient dependent x2 with use of stedy for sit <> stand transfers  Reason if not Attempted: Not attempted due to medical condition or safety concerns  CARE Score: 88  Discharge Goal: Partial/moderate assistance  Wheelchair Ability  Uses a Wheelchair and/or Scooter?: Yes  Wheel 50 Feet with Two Turns  Assistance Needed: Supervision or touching assistance  Physical Assistance Level: No physical assistance  CARE Score: 4  Discharge Goal: Independent  Wheel 150 Feet  Assistance Needed: Substantial/maximal assistance  Physical Assistance Level: 50%-74%  Comment: patient fatigued with 50 feet of w/c propulsion and required assistance to propel manual w/c remaining distance  CARE Score: 2  Discharge Goal: Independent        1 Step (Curb)  Comment: not safe to attempt at this time due to patient dependent x2 with use of stedy for sit <> stand transfers  Reason if not Attempted: Not attempted due to medical condition or safety concerns  CARE Score: 88  Discharge Goal: Partial/moderate assistance   4 Steps  Comment: not safe to attempt at this time due to patient dependent x2 with use of stedy for sit <> stand transfers  Reason if not Attempted: Not attempted due to medical condition or safety concerns  CARE Score: 88  Discharge Goal: Partial/moderate assistance   12 Steps  Comment: not safe to attempt at this time due to patient dependent x2 with use of stedy for sit <> stand transfers  Reason if not Attempted: Not attempted due to medical condition or safety concerns  CARE Score: 88  Discharge Goal: Partial/moderate assistance    Fall Risk: [x]  Yes  []  No    OCCUPATIONAL THERAPY   Short term goals  Time Frame for Short term goals: STG=LTG :   Long term goals  Time Frame for Long term goals : 10-14 days  Long term goal 1: Pt will perform all grooming tasks with Mod I. MET seated  Long term goal 2: Pt will perform sponge bathing with Min A and AE/DME. MET; CGA   Long term goal 3: Pt will perform UB dressing with Mod I. NMET; supervision  Long term goal 4: Pt will perform LB dressing with Min A and AE/DME. NMET; mod A using AE  Long term goal 5: Pt will don/doff footwear with Mod I and AE. NMET; mod A using AE  Long term goals 6: Pt will complete toileting with Min A and AE/DME. NMET; mod A when having BM  Long term goal 7: Pt will complete all functional transfers (toilet, tub, bed) with Min A and DME.  MET; CGA  Long term goal 8: Pt will participate in therex/theract with emphasis on static stance >3-5 min to increase standing tolerance and endurance needed for ADLs/IADLs. Ongoing                                      OT IRF-MIREILLE scores and goals since initial assessment:    Eating  Assistance Needed: Independent  Comment: Per pt report  CARE Score: 6  Oral Hygiene  Assistance Needed: Setup or clean-up assistance  Comment: Pt performed oral care w/c level with setup. CARE Score: 5  Discharge Goal: Independent  Toileting Hygiene  Assistance Needed: Dependent  Reason if not Attempted: Not attempted due to medical condition or safety concerns(Cordero catheter)  CARE Score: 1  Discharge Goal: Partial/moderate assistance  Shower/Bathe Self  Assistance Needed: Substantial/maximal assistance, Partial/moderate assistance  Comment: Pt performed sponge bathing w/c level with Mod A to wash anterior/posterior vikki area and distal BLE 2/2 spinal precautions. Discharge Goal: Partial/moderate assistance  Upper Body Dressing  Assistance Needed: Partial/moderate assistance  Comment: Pt doffed/donned shirt with SBA, however req Max A to don TLSO, requiring Mod A overall. CARE Score: 3  Discharge Goal: Independent  Lower Body Dressing  Assistance Needed: Dependent  Comment: Don/doff pants Dep x2 with use of Leretha David for STS. Pt unable to thread LE 2/2 sternal precautions. CARE Score: 1  Discharge Goal: Partial/moderate assistance  Putting On/Taking Off Footwear  Assistance Needed: Dependent  Comment: Dep to don/doff socks, pt demo poor knee extension to assist with lifting LE.   CARE Score: 1  Discharge Goal: Independent      Impairments/deficits, barriers: Decreased balance, endurance, posture, still presenting with mild cognitive deficits  Assessment  Performance deficits / Impairments: Decreased functional mobility , Decreased high-level IADLs, Decreased ADL status, Decreased endurance, Decreased fine motor control, Decreased ROM, Decreased strength, Decreased balance, Decreased posture  Prognosis: Good  Decision Making: High Complexity  REQUIRES OT FOLLOW UP: Yes  Discharge Recommendations: Continue to assess pending progress, Home with assist PRN  Equipment needed at discharge: [facility] (is the one in his room his??), shower chair with back? ?      COGNITIVE FUNCTION/SPEECH THERAPY (AS INDICATED)  LTG        Nursing Current Medical Status:   [x] Is continent of bowel and bladder     [] Is incontinent of bowel and bladder    [x] Has had an adequate number of bowel movements   [] Urinates with no urinary retention >300ml in bladder   [] Targeting bladder protocol with newell removal   [x] Maintaining O2 SATs at 92% or greater   [x] Has pain managed while on ARU         [x] Has had no skin breakdown while on ARU   [] Has improved skin integrity via wound measurements   [] Has no signs/symptoms of infection at the wound site   [] Pressure wounds Stage/Location:    [] Arrived on unit with pressure wound  [x] Has been free from injury during hospitalization   [] Has experienced a fall during hospitalization  [] Ongoing education with patient/family with understanding demonstrated for:  [] Receives IV Fluids  [] Other:        NUTRITION  Weight: 219 lb (99.3 kg) / Body mass index is 29.7 kg/m². Current diet: Dietary Nutrition Supplements: Low Calorie High Protein Supplement  DIET GENERAL; Carb Control: 5 carb choices (75 gms)/meal  Intake: Improving intake, reasonable meals, intake %      Medical improvements/barriers: improving strength, cognitive issues with carryover of safety and sequencing, dizziness, variable performance, fatigues quickly, brace, improved intake        Team goals for next treatment period/Intervention for current barriers:   [x] Pt will increase activity tolerance for daily tasks.   [x] Pt will improve bed mobility with reduced assist.  [x] Pt will improve safety in fx tasks with reduced cues/assist  [x] Pt will improve transfers with reduced assist  [x] Pt will improve toileting with reduced assist  [x] Pt will improve ADL's with use of adaptive equipment with reduced

## 2020-12-28 NOTE — PROGRESS NOTES
Physical Therapy  [x] daily progress note       [] discharge       Patient Name:  Krish Redman   :  1950 MRN: [id number]  Room:  [address] [place], [place] [address] Date of Admission: 2020  Rehabilitation Diagnosis:   Spinal stenosis, lumbar region with neurogenic claudication [M48.062]  Spinal stenosis of lumbar region with radiculopathy [M48.061, M54.16]       Date 2020       Day of ARU Week:  4   Time IN/OUT 1029/1059   Individual Tx Minutes 30   Group Tx Minutes    Co-Treat Minutes    Concurrent Tx Minutes    TOTAL Tx Time Mins 30   Variance Time    Variance Time []   Refusal due to:     []   Medical hold/reason:    []   Illness   []   Off Unit for test/procedure  []   Extra time needed to complete task  []   Therapeutic need  []   Other (specify):   Restrictions Restrictions/Precautions  Restrictions/Precautions: Weight Bearing, Fall Risk, General Precautions(B LE WBAT, back brace on when up)  Required Braces or Orthoses?: Yes  Position Activity Restriction  Spinal Precautions: No Bending, No Lifting, No Twisting  Spinal Precautions: Educated pt on precautions, pt verbalized understanding. Other position/activity restrictions: Wound vac on lumbar spine, newell catheter. Interdisciplinary communication [x]   Cleared for therapy per nursing     []   RN notified about issues during session  []   RN updated on pt performance  []   Spoke with   []   Spoke with OT  []   Spoke with MD  []   Other:    Subjective observations and cognitive status: Pt agreeable to therapy.  Inconsistent with carryover of safety   Pain level/location:    0/10       Location:    Discharge recommendations  Anticipated discharge date:    Destination: []home alone   []home alone with assist PRN     [x] home w/ family      [x] Continuous supervision  []SNF    [] Assisted living     [] Other:  Continued therapy: [x]HHC PT  []OUTPATIENT  PT   [] No Further PT  []SNF PT  Caregiver training recommended: []Yes  [] No   Equipment needs: Domenico Zamudio Sr. requires the assistance of a wheeled walker to successfully ambulate from room to room at home to allow completion of daily living tasks such as: bathing, toileting, dressing and grooming. A wheeled walker is necessary due to the patient's unsteady gait, upper body weakness, inability to  a standard walker. This patient can ambulate only by pushing a walker instead of using a lesser assistive device such as a cane or crutch. Yara Jaimes requires the assistance of a lightweight wheelchair with anti-tippers because he requires this to successfully complete daily living tasks of eating, bathing, toileting, personal cares, ambulating and grooming.  A lightweight wheelchair with anti-tippers is necessary due to the patient's impaired ambulation and mobility restrictions.  The patient would not be able to resolve these daily living tasks using a cane or walker.  The patient can self-propel a lightweight wheelchair with anti-tippers safely in their home and can maneuver within their home with adequate access.  The patient cannot self-propel in a standard wheelchair.  The need for this equipment was discussed with the patient and he understands, is in agreement, and has not expressed an unwillingness to use the wheelchair. Expected length of need is 12 mos   Yara Avalos does not have any infectious diagnoses. Bed Mobility:           []   Pt received out of bed   Lying --> Sit:  Mod I  Bed features used: [x] Yes  [] No   Transfers:    Sit--> Stand:  CG  Stand --> Sit:   Min assist with cues for control  Assistive device required for transfer:   *2ww    Gait:    Distance:  150'    Assistance:  CGA  Device:  2ww  Gait Quality:  Education with pt on need to use step to pattern for increased control and increased use of LE's and decreased leaning on UEs.  Educated pt in difference in control of 4ww vs 2ww and standard walker and why PT is recommending 2ww at this time. Answered pt questions regarding insurance as able and deferred him to [facility] [employer] details    Wheelchair Propulsion:  Distance:  200' for LE strengthening  Assistance: Mod I  Extremities Used:   B UE/LE  Type:    [x]  Manual        []  Electric        Additional Therapeutic activities/exercises completed this date:     []   Nu-step:  Time:        Level:         #Steps:       []   Rebounder:    []  Seated     []  Standing        []   Balance training         [x]   Postural training: use of visual feedback of mirror for postural training without UE support with allowance of slight trunk flexion. Pt could hold for only 20 seconds at a time before needing UE assist. L knee TKR poor    []   Supine ther ex (reps/sets):     []   Seated ther ex (reps/sets):     []   Standing ther ex (reps/sets):     []   Picking up object from floor (standing):                   []   Reacher used   []   Other:   []   Other:    Comments:  Encouraged pt to sit up in recliner as he most often returns to bed    Patient/Caregiver Education and Training:   []   Role of PT  [x]   Education about Dx  [x]   Use of call light for assist   [x]   Wheelchair mobility/management  []   HEP provided and explained   [x]   Treatment plan reviewed  []   Home safety  []   Body mechanics  []   Positioning  [x]   Bed Mobility/Transfer technique  [x]   Gait technique/sequencing  []   Proper use of assistive device/adaptive equipment  []   Stair training/Advanced mobility safety and technique  []   Reinforced patient's precautions/mobility while maintaining precautions  []   Postural awareness  []   Family/caregiver training  []   Progress was updated and reviewed in Rehabtracker with patient and/or family this date. []   Other:      Treatment Plan for Next Session: stair training, ramp training     Assessment: pt improving in LE strength for improved transfers.  Safety issues persist and feel cognitive issues are not improving    Treatment/Activity Tolerance:   [] Tolerated treatment with no adverse effects    [x] Patient limited by fatigue  [] Patient limited by pain   [] Patient limited by medical complications:    [] Adverse reaction to Tx:   [] Significant change in status    Barrier/s to progress/learning:   []   None  [x]   Cognition  []   Hearing deficit  []   Pre-morbid mental/psychological status   []   Motivation  []   Communication  []   Anxiety  []   Vision deficit  []   Attention  []   Other:      Safety:       []  bed alarm set    [x]  chair alarm set    []  Pt refused alarms                []  Telesitter activated      [x]  Gait belt used during tx session      []other:         Number of Minutes/Billable Intervention  Gait Training 10   Therapeutic Exercise    Neuro Re-Ed 15   Therapeutic Activity 5   Wheelchair Propulsion    Group    Other:    TOTAL 30         Social History  Social/Functional History  Lives With: Significant other  Type of Home: House  Home Layout: One level  Home Access: Ramped entrance  Bathroom Shower/Tub: Tub/Shower unit, Shower chair without back(Pt does not use shower chair - pt bathes in tub.)  Bathroom Toilet: Standard  Bathroom Equipment: Grab bars in [address] [facility]: Rolling walker, Cane(Pt uses rollator at baseline, also has access to cane.)  ADL Assistance: Independent  Homemaking Assistance: (Rollator)  Homemaking Responsibilities: No(Pt's girlfriend performs IADLs)  Ambulation Assistance: Independent(Mod I with use of rollator)  Transfer Assistance: Independent  Active : No  Patient's  Info: Pt's girlfriend drives  Education: Some college  Occupation: Retired  Leisure & Hobbies: Martial arts  IADL Comments: Pt manages own medications. Additional Comments: Pt sleeps on flat bed at baseline. Pt reports no falls in the past year. Objective                                                                                    Goals:  (Update in navigator)   :   Long term goals  Time Frame for Long term goals : In 10 days:  Long term goal 1: Pt will complete all bed mobility independently  Long term goal 2: Pt will complete sit <> stand transfers with modAx1  Long term goal 3: Pt will ambulate 20 feet with modAx2 with LRAD  Long term goal 4: Pt will propel manual w/c 150 feet independently  Long term goal 5: Pt will independently complete 3 sets of 10 reps of BLE AROM exercises in available and allowed ROM:        Plan of Care                                                                              Times per week: 5 days per week for a minimum of 60 minutes/day plus group as appropriate for 60 minutes.   Treatment to include Current Treatment Recommendations: Strengthening, ROM, Balance Training, Transfer Training, ADL/Self-care Training, Functional Mobility Training, IADL Training, Neuromuscular Re-education, Safety Education & Training, Home Exercise Program, Endurance Training, Patient/Caregiver Education & Training, Equipment Evaluation, Education, & procurement, Gait Training, Pain Management, Positioning, Wheelchair Mobility Training, Stair training    Electronically signed by   Harry Corea PT  12/28/2020, 3:55 PM

## 2020-12-29 LAB
GLUCOSE BLD-MCNC: 103 MG/DL (ref 70–99)
GLUCOSE BLD-MCNC: 109 MG/DL (ref 70–99)
GLUCOSE BLD-MCNC: 149 MG/DL (ref 70–99)
GLUCOSE BLD-MCNC: 158 MG/DL (ref 70–99)

## 2020-12-29 PROCEDURE — 1280000000 HC REHAB R&B

## 2020-12-29 PROCEDURE — 6370000000 HC RX 637 (ALT 250 FOR IP): Performed by: PHYSICAL MEDICINE & REHABILITATION

## 2020-12-29 PROCEDURE — 97110 THERAPEUTIC EXERCISES: CPT

## 2020-12-29 PROCEDURE — 97530 THERAPEUTIC ACTIVITIES: CPT

## 2020-12-29 PROCEDURE — 82962 GLUCOSE BLOOD TEST: CPT

## 2020-12-29 PROCEDURE — 94761 N-INVAS EAR/PLS OXIMETRY MLT: CPT

## 2020-12-29 PROCEDURE — 99233 SBSQ HOSP IP/OBS HIGH 50: CPT | Performed by: PHYSICAL MEDICINE & REHABILITATION

## 2020-12-29 PROCEDURE — 97116 GAIT TRAINING THERAPY: CPT

## 2020-12-29 PROCEDURE — 97542 WHEELCHAIR MNGMENT TRAINING: CPT

## 2020-12-29 RX ADMIN — DOCUSATE SODIUM 100 MG: 100 CAPSULE ORAL at 08:32

## 2020-12-29 RX ADMIN — ACETAMINOPHEN 1000 MG: 500 TABLET ORAL at 18:03

## 2020-12-29 RX ADMIN — METOPROLOL TARTRATE 50 MG: 50 TABLET, FILM COATED ORAL at 21:21

## 2020-12-29 RX ADMIN — ACETAMINOPHEN 1000 MG: 500 TABLET ORAL at 08:31

## 2020-12-29 RX ADMIN — ASPIRIN 81 MG CHEWABLE TABLET 81 MG: 81 TABLET CHEWABLE at 08:32

## 2020-12-29 RX ADMIN — METOPROLOL TARTRATE 50 MG: 50 TABLET, FILM COATED ORAL at 08:31

## 2020-12-29 RX ADMIN — PRAVASTATIN SODIUM 40 MG: 40 TABLET ORAL at 21:22

## 2020-12-29 RX ADMIN — OXYCODONE HYDROCHLORIDE 10 MG: 5 TABLET ORAL at 03:16

## 2020-12-29 RX ADMIN — ACETAMINOPHEN 1000 MG: 500 TABLET ORAL at 12:20

## 2020-12-29 RX ADMIN — METFORMIN HYDROCHLORIDE 500 MG: 500 TABLET ORAL at 18:03

## 2020-12-29 RX ADMIN — ACETAMINOPHEN 1000 MG: 500 TABLET ORAL at 21:21

## 2020-12-29 RX ADMIN — STANDARDIZED SENNA CONCENTRATE 8.6 MG: 8.6 TABLET ORAL at 21:21

## 2020-12-29 ASSESSMENT — PAIN SCALES - GENERAL
PAINLEVEL_OUTOF10: 4
PAINLEVEL_OUTOF10: 5

## 2020-12-29 NOTE — PROGRESS NOTES
Physical Therapy      [x] daily progress note       [] discharge       Patient Name:  Ricardo Pratt   :  1950 MRN: [id number]  Room:  [address] [place], [place] [address] Date of Admission: 2020  Rehabilitation Diagnosis:   Spinal stenosis, lumbar region with neurogenic claudication [M48.062]  Spinal stenosis of lumbar region with radiculopathy [M48.061, M54.16]       Date 2020       Day of ARU Week:  5   Time IN/OUT 1686-2342  2588-5935   Individual Tx Minutes 120   TOTAL Tx Time Mins 120   Variance Time    Variance Time []   Refusal due to:     []   Medical hold/reason:    []   Illness   []   Off Unit for test/procedure  []   Extra time needed to complete task  []   Therapeutic need  []   Other (specify):   Restrictions Restrictions/Precautions  Restrictions/Precautions: Weight Bearing, Fall Risk, General Precautions(B LE WBAT, back brace on when up)  Required Braces or Orthoses?: Yes  Position Activity Restriction  Spinal Precautions: No Bending, No Lifting, No Twisting  Spinal Precautions: Educated pt on precautions, pt verbalized understanding. Other position/activity restrictions: Wound vac on lumbar spine, newell catheter. Communication with other providers: [x]   OK to see per nursing:     []   Spoke with team member regarding:      Subjective observations and cognitive status: Pt resting in bed, refusing to participate. Reports having episode of \"everything spinning\" this morning, and generally not feeling well. Reports having increased LBP and radicular pain R LE when moving, denies pain at rest. Reports nursing was aware of complaints and medicine given. Pt slightly agitated, then apologetic. Eventually willing to attempt car transfer and self propel WC. No episodes of vertigo during therapy session, however pt was reporting still not feeling well. Discussed with nursing and OT at end of session.    VS: semi fowlers: /71, HR 82, O2 sats 95  %  Sitting: /76, HR 88, O2 sats 93%  Standing: BP 133/83, HR 94  PM: Pt up in [address], reports feeling better than this AM, agreeable to tx but unwilling to amb due to fatigue     Pain level/location: 0/10   At rest, sitting EOB no complaints, standing 5/10    Location: LBP and L LE   Discharge recommendations  Anticipated discharge date:  1/5  Destination: []? ???home alone   []? ???home alone with assist PRN     [x]???? home w/ friend Jr Hawk      [x]? ??? Continuous supervision  []????SNF    []???? Assisted living     []???? Other:  Continued therapy: [x]????HHC PT  []????OUTPATIENT  PT   []???? No Further PT  []????SNF PT  Caregiver training recommended: []?? ??Yes  []???? No   Equipment needs:RW and manual WC  Rick Schirmer Sr. requires the assistance of a wheeled walker to successfully ambulate from room to room at home to allow completion of daily living tasks such as: bathing, toileting, dressing and grooming. A wheeled walker is necessary due to the patient's unsteady gait, upper body weakness, inability to  a standard walker. This patient can ambulate only by pushing a walker instead of using a lesser assistive device such as a cane or crutch. Muna Melendez requires a standard wheelchair to successfully complete daily living tasks such as: bathing, toileting, dressing and grooming; or any other daily living task in the home. A standard manual wheelchair is necessary due to patient's impaired ambulation and mobility restrictions and would be unable to resolve these daily living tasks using a cane or walker. The patient is capable of using/self-propelling a standard wheelchair safely in their home and can maneuver within their home with adequate access in a reasonable amount of time. The patient has not expressed an unwillingness to use the wheelchair. Having this wheelchair would enable Rick Schirmer Sr. ability to regularly participate in the above mentioned daily living tasks around the home.   There is a caregiver available to provide necessary assistance. Expected length of need is lifetime. Jeri Villegas does not have any infectious diagnoses. Tomas Samira Sr. can self propel a wheelchair and needs anti-rollback device to propel up ramps. Bed Mobility:           []   Pt received out of bed   Rolling R/L:  Indep   Scooting: sit scoot to EOB SBA  Supine --> Sit: Indep from SL without bed features  Sit --> Supine:  Indep without bed features      Transfers:    Sit--> Stand:  SBA  Stand --> Sit:   SBA  Req max safety cues for hand placement during transfer and locking WC brakes before attempting to stand. Max cues to reach back and controlled descent with stand-sit  Stand-Pivot:   CGA  Car Transfers:  CGA for SPT to /from car, otherwise SBA. Pt able to follow spinal precautions with transfer, req cues for safety with hand placement  Assistive device required for transfer:   RW    Gait:    Pt refusing amb today due to \"not feeling well\" today and c/o fatigue    Wheelchair Propulsion:  Distance:   165' x 2` / 72'+  Assistance:   Supervision  Extremities Used:   B UEs first distance, B UEs and LEs second distance/ PM: UEs only    Uneven Surfaces:       Assistance:    Min A,demonstratiion provided prior to ax, with pt req max cues during session for safety with transitioning AD over carpet edge, safety with turning, and maintaining all legs of AD on carpet. Pt with max UE WB with difficulty de-weighting to lift back legs and rolling carpet under feet, as well as R back leg being off R side of carpet edge.    Device:    RW  Surfaces Completed:   [] Green mat with bean bags beneath       [] Throw rugs          [] Outdoor pavements      [] Grass          [] Loose gravel   [] Carpet      []  Other:       Additional Therapeutic activities/exercises completed this date:     [x]   Nu-step:  Time:6'+8'      Level: 4        #Steps: 280+ 904     []   Rebounder:    []  Seated     []  Standing        []   Balance training         []   Postural training    [x]   Supine ther ex (reps/sets): DLS ex for abdominal bracing, stage 1 abdominal curl ups with min cues, stage 1 dead bug ex's with Mod TCs and  VCs    []   Seated ther ex (reps/sets):     []   Standing ther ex (reps/sets):     []   Picked up object from floor                       []   Reacher used   []   Other:   []   Other:   []   Other:    Patient/Caregiver Education and Training:   [x]   Bed Mobility/Transfer technique/safety  []   Gait technique/sequencing  [x]   Proper use of assistive device  [x]   Advanced mobility safety and technique  []   Reinforced patient's precautions with mobility/functional tasks  [x]   Postural awareness  []   Family training  [x]   Progress was updated and reviewed in Rehabtracker with patient and/or family this date. Treatment Plan for Next Session: gait progression, LE and abdominal ex, standing balance, transfer safety, amb over threshold and carpet. Treatment/Activity Tolerance:   [] Tolerated treatment with no adverse effects    [] Patient limited by fatigue  [x] Patient limited by pain and general c/o not feeling well.    [] Patient limited by medical complications:    [] Adverse reaction to Tx:   [] Significant change in status    Safety:       []  bed alarm set    [x]  chair alarm set    []  Pt refused alarms                []  Telesitter activated      [x]  Gait belt used during tx session      [x]other: Pt [facility] to [facility]          Number of Minutes/Billable Intervention  Gait Training 15   Therapeutic Exercise 30   Neuro Re-Ed    Therapeutic Activity 45   Wheelchair Propulsion 30   Group    Other:    TOTAL 120         Social History  Social/Functional History  Lives With: Significant other  Type of Home: House  Home Layout: One level  Home Access: Ramped entrance  Bathroom Shower/Tub: Tub/Shower unit, Shower chair without back(Pt does not use shower chair - pt bathes in tub.)  Bathroom Toilet: Standard  Bathroom Equipment: Grab bars in shower  Home Equipment: Rolling walker, Cane(Pt uses rollator at baseline, also has access to cane.)  ADL Assistance: [address]: (Rollator)  Homemaking Responsibilities: No(Pt's girlfriend performs IADLs)  Ambulation Assistance: Independent(Mod I with use of rollator)  Transfer Assistance: Independent  Active : No  Patient's  Info: Pt's girlfriend drives  Education: Some college  Occupation: Retired  Leisure & Hobbies: Martial arts  IADL Comments: Pt manages own medications. Additional Comments: Pt sleeps on flat bed at baseline. Pt reports no falls in the past year. Objective                                                                                    Goals:  (Update in navigator)   : Long term goals  Time Frame for Long term goals : In 10 days:  Long term goal 1: Pt will complete all bed mobility independently  Long term goal 2: Pt will complete sit <> stand transfers with modAx1  Long term goal 3: Pt will ambulate 20 feet with modAx2 with LRAD  Long term goal 4: Pt will propel manual w/c 150 feet independently  Long term goal 5: Pt will independently complete 3 sets of 10 reps of BLE AROM exercises in available and allowed ROM:        Plan of Care                                                                              Times per week: 5 days per week for a minimum of 60 minutes/day plus group as appropriate for 60 minutes.   Treatment to include Current Treatment Recommendations: Strengthening, ROM, Balance Training, Transfer Training, ADL/Self-care Training, Functional Mobility Training, IADL Training, Neuromuscular Re-education, Safety Education & Training, Home Exercise Program, Endurance Training, Patient/Caregiver Education & Training, Equipment Evaluation, Education, & procurement, Gait Training, Pain Management, Positioning, Wheelchair Mobility Training, Stair training    Electronically signed by   Brad Rojas PTA #[id number]  12/29/2020, 9:43 AM

## 2020-12-29 NOTE — PROGRESS NOTES
Occupational Therapy  Physical Rehabilitation: OCCUPATIONAL THERAPY     [x] daily progress note       [] discharge       Patient Name:  Latrice Costa.   :  1950 MRN: [id number]  Room:  [address] [place] [address] Date of Admission: 2020  Rehabilitation Diagnosis:   Spinal stenosis, lumbar region with neurogenic claudication [M48.062]  Spinal stenosis of lumbar region with radiculopathy [M48.061, M54.16]       Date 2020       Day of ARU Week:  5   Time IN/OUT 7721-4814   Individual Tx Minutes 60   Group Tx Minutes    Co-Treat Minutes    Concurrent Tx Minutes    TOTAL Tx Time Mins 60   Variance Time    Variance Time []   Refusal due to:     []   Medical hold/reason:    []   Illness   []   Off Unit for test/procedure  []   Extra time needed to complete task  []   Therapeutic need  []   Other (specify):   Restrictions Restrictions/Precautions: Weight Bearing, Fall Risk, General Precautions(B LE WBAT, back brace on when up)         Communication with other providers: [x]   OK to see per nursing:     []   Spoke with team member regarding:      Subjective observations and cognitive status: Pt reports, \"I'm feeling extremely tired, exhausted, I can't do much today and I just don't know why. This started last night and I'm worried. \"     Pain level/location:    /10       Location: denies pain   Discharge recommendations  Anticipated discharge date:    Destination: []home alone   []home alone w assist prn   [x] home w/ family    [] Continuous supervision       []SNF    [] Assisted living     [] Other:   Continued therapy: [x]HHC OT  []OUTPATIENT  OT   [] No Further OT  Equipment needs: has BSC and grab bars. Mehul shower chair with back      Transfers:    Sit--> Stand:  SBA  Stand --> Sit:   CGA  Other:    Assistive device required for transfer:   FWW      Functional Mobility:    Assistance:  TO/FROM GYM c w/c.   Device:   []   [facility]     []   Standard Walker [x]   Wheelchair        []   [employer]       [] Jenaro Schultz         []   Cardiac Walker       []   Other:         Additional Therapeutic activities/exercises completed this date:     []   ADL Training   []   Balance/Postural training     []   Bed/Transfer Training   []   Endurance Training   []   Neuromuscular Re-ed   []   Nu-step:  Time:        Level:         #Steps:       []   Rebounder:    []  Seated     []  Standing        []   Supine Ther Ex (reps/sets):     [x]   Seated Ther Ex (reps/sets):  BUE reaching/reciprocal movements required for w/c mobility and increasing BUE strength   [x]   Standing Ther Ex (reps/sets): Tolerated 4 mins of dynamic standing balance ax, completed armbike exercise (30 RPM). [x]   Other:Education on DME recommendations      Comments:      Patient/Caregiver Education and Training:   []   Adaptive Equipment Use  []   Bed Mobility/Transfer Technique/Safety  [x]   Energy Conservation Tips  []   Family training  [x]   Postural Awareness  [x]   Safety During Functional Activities  []   Reinforced Patient's Precautions   []   Progress was updated and reviewed in Rehabtracker with patient and/or family this         date. Treatment Plan for Next Session: tub transfer bench training. Assessment:  Pt's major barrier this date was c/o fatigue and poor activity tolerance this date.        Treatment/Activity Tolerance:   [x] Tolerated treatment with no adverse effects    [] Patient limited by fatigue  [] Patient limited by pain   [] Patient limited by medical complications:    [] Adverse reaction to Tx:   [] Significant change in status    Safety:       []  bed alarm set    [x]  chair alarm set    []  Pt refused alarms                []  Telesitter activated      [x]  Gait belt used during tx session      []other:       Number of Minutes/Billable Intervention  Therapeutic Exercise 30   ADL Self-care    Neuro Re-Ed    Therapeutic Activity 30   Group    Other:    TOTAL 60       Social History  Social/Functional History  Lives With: Significant other  Type of Home: House  Home Layout: One level  Home Access: Ramped entrance  Bathroom Shower/Tub: Tub/Shower unit, Shower chair without back(Pt does not use shower chair - pt bathes in tub.)  Bathroom Toilet: Standard  Bathroom Equipment: Grab bars in shower  Home Equipment: Rolling walker, Cane(Pt uses rollator at baseline, also has access to cane.)  ADL Assistance: [address]: (Rollator)  Homemaking Responsibilities: No(Pt's girlfriend performs IADLs)  Ambulation Assistance: Independent(Mod I with use of rollator)  Transfer Assistance: Independent  Active : No  Patient's  Info: Pt's girlfriend drives  Education: Some college  Occupation: Retired  Leisure & Hobbies: Martial arts  IADL Comments: Pt manages own medications. Additional Comments: Pt sleeps on flat bed at baseline. Pt reports no falls in the past year. Objective                                                                                    Goals:  (Update in navigator)  Short term goals  Time Frame for Short term goals: STG=LTG:  Long term goals  Time Frame for Long term goals : 10-14 days  Long term goal 1: Pt will perform all grooming tasks with Mod I.  Long term goal 2: Pt will perform sponge bathing with Min A and AE/DME. Long term goal 3: Pt will perform UB dressing with Mod I.  Long term goal 4: Pt will perform LB dressing with Min A and AE/DME. Long term goal 5: Pt will don/doff footwear with Mod I and AE. Long term goals 6: Pt will complete toileting with Min A and AE/DME. Long term goal 7: Pt will complete all functional transfers (toilet, tub, bed) with Min A and DME. Long term goal 8: Pt will participate in therex/theract with emphasis on static stance >3-5 min to increase standing tolerance and endurance needed for ADLs/IADLs. :        Plan of Care                                                                              Times per week: 5 days per week for a minimum of 60 minutes/day plus group as appropriate for 60 minutes.   Treatment to include Plan  Times per week: 5x/week  Times per day: Daily  Plan weeks: 10-14 days  Specific instructions for Next Treatment: LB AE education  Current Treatment Recommendations: Strengthening, Wheelchair Mobility Training, Pain Management, Positioning, ROM, Gait Training, Safety Education & Training, Balance Training, Stair training, Patient/Caregiver Education & Training, Self-Care / ADL, Functional Mobility Training, Equipment Evaluation, Education, & procurement, Home Management Training, Endurance Training    Electronically signed by   LUCY Davila, OTR/L  License # [id number]    12/29/2020, 3:49 PM

## 2020-12-30 LAB
GLUCOSE BLD-MCNC: 102 MG/DL (ref 70–99)
GLUCOSE BLD-MCNC: 108 MG/DL (ref 70–99)
GLUCOSE BLD-MCNC: 118 MG/DL (ref 70–99)
GLUCOSE BLD-MCNC: 152 MG/DL (ref 70–99)

## 2020-12-30 PROCEDURE — 6370000000 HC RX 637 (ALT 250 FOR IP): Performed by: PHYSICAL MEDICINE & REHABILITATION

## 2020-12-30 PROCEDURE — 6370000000 HC RX 637 (ALT 250 FOR IP): Performed by: NURSE PRACTITIONER

## 2020-12-30 PROCEDURE — 99232 SBSQ HOSP IP/OBS MODERATE 35: CPT | Performed by: PHYSICAL MEDICINE & REHABILITATION

## 2020-12-30 PROCEDURE — 97116 GAIT TRAINING THERAPY: CPT

## 2020-12-30 PROCEDURE — 97530 THERAPEUTIC ACTIVITIES: CPT

## 2020-12-30 PROCEDURE — 82962 GLUCOSE BLOOD TEST: CPT

## 2020-12-30 PROCEDURE — 97110 THERAPEUTIC EXERCISES: CPT

## 2020-12-30 PROCEDURE — 94761 N-INVAS EAR/PLS OXIMETRY MLT: CPT

## 2020-12-30 PROCEDURE — 1280000000 HC REHAB R&B

## 2020-12-30 PROCEDURE — 97542 WHEELCHAIR MNGMENT TRAINING: CPT

## 2020-12-30 RX ADMIN — OXYCODONE HYDROCHLORIDE 10 MG: 5 TABLET ORAL at 00:59

## 2020-12-30 RX ADMIN — PRAVASTATIN SODIUM 40 MG: 40 TABLET ORAL at 21:03

## 2020-12-30 RX ADMIN — METFORMIN HYDROCHLORIDE 500 MG: 500 TABLET ORAL at 16:49

## 2020-12-30 RX ADMIN — ACETAMINOPHEN 1000 MG: 500 TABLET ORAL at 07:50

## 2020-12-30 RX ADMIN — STANDARDIZED SENNA CONCENTRATE 8.6 MG: 8.6 TABLET ORAL at 21:03

## 2020-12-30 RX ADMIN — ACETAMINOPHEN 1000 MG: 500 TABLET ORAL at 11:21

## 2020-12-30 RX ADMIN — OXYCODONE HYDROCHLORIDE 5 MG: 5 TABLET ORAL at 16:50

## 2020-12-30 RX ADMIN — ASPIRIN 81 MG CHEWABLE TABLET 81 MG: 81 TABLET CHEWABLE at 07:46

## 2020-12-30 RX ADMIN — METOPROLOL TARTRATE 50 MG: 50 TABLET, FILM COATED ORAL at 07:47

## 2020-12-30 RX ADMIN — POLYETHYLENE GLYCOL (3350) 17 G: 17 POWDER, FOR SOLUTION ORAL at 07:47

## 2020-12-30 RX ADMIN — DOCUSATE SODIUM 100 MG: 100 CAPSULE ORAL at 07:46

## 2020-12-30 RX ADMIN — OXYCODONE HYDROCHLORIDE 5 MG: 5 TABLET ORAL at 07:49

## 2020-12-30 RX ADMIN — METOPROLOL TARTRATE 50 MG: 50 TABLET, FILM COATED ORAL at 21:02

## 2020-12-30 RX ADMIN — ACETAMINOPHEN 1000 MG: 500 TABLET ORAL at 16:49

## 2020-12-30 ASSESSMENT — PAIN DESCRIPTION - DESCRIPTORS: DESCRIPTORS: ACHING;DISCOMFORT;DULL

## 2020-12-30 ASSESSMENT — PAIN DESCRIPTION - PAIN TYPE: TYPE: ACUTE PAIN;SURGICAL PAIN

## 2020-12-30 ASSESSMENT — PAIN DESCRIPTION - PROGRESSION
CLINICAL_PROGRESSION: GRADUALLY IMPROVING

## 2020-12-30 ASSESSMENT — PAIN DESCRIPTION - ONSET: ONSET: ON-GOING

## 2020-12-30 ASSESSMENT — PAIN DESCRIPTION - ORIENTATION
ORIENTATION: LOWER
ORIENTATION: LOWER

## 2020-12-30 ASSESSMENT — PAIN SCALES - GENERAL
PAINLEVEL_OUTOF10: 0
PAINLEVEL_OUTOF10: 1
PAINLEVEL_OUTOF10: 6
PAINLEVEL_OUTOF10: 3

## 2020-12-30 ASSESSMENT — PAIN DESCRIPTION - FREQUENCY
FREQUENCY: CONTINUOUS
FREQUENCY: CONTINUOUS

## 2020-12-30 ASSESSMENT — PAIN DESCRIPTION - LOCATION
LOCATION: BACK
LOCATION: BACK

## 2020-12-30 NOTE — CARE COORDINATION
LSW met with patient following Care Conference. LSW reminded patient of recommendations for a rolling walker, manual wheelchair, and bedside commode (although a new one has appeared in patient's room). Patient still in agreement for all. LSW then reminded of recommendation for [facility] PT, OT, and Nursing. Patient agrees and will choose agency closer to discharge. D/C Plan:  Date:  Jan. 5th  DME:  [facility], [address] [facility] (has?)  HHC:  PT, OT, Nursing (Agency TBD)  To:   Home with friend Jordan [facility] will transport)

## 2020-12-30 NOTE — PROGRESS NOTES
Occupational Therapy  Physical Rehabilitation: OCCUPATIONAL THERAPY     [x] daily progress note       [] discharge       Patient Name:  Sanjay Leigh   :  1950 MRN: [id number]  Room:  [address] [place] [address] Date of Admission: 2020  Rehabilitation Diagnosis:   Spinal stenosis, lumbar region with neurogenic claudication [M48.062]  Spinal stenosis of lumbar region with radiculopathy [M48.061, M54.16]       Date 2020       Day of ARU Week:  6   Time IN/OUT 1304/1404   Individual Tx Minutes 60   Group Tx Minutes    Co-Treat Minutes    Concurrent Tx Minutes    TOTAL Tx Time Mins 60   Variance Time    Variance Time []   Refusal due to:     []   Medical hold/reason:    []   Illness   []   Off Unit for test/procedure  []   Extra time needed to complete task  []   Therapeutic need  []   Other (specify):   Restrictions Restrictions/Precautions: Weight Bearing, Fall Risk, General Precautions(B LE WBAT, back brace on when up)         Communication with other providers: [x]   OK to see per nursing:     []   Spoke with team member regarding:      Subjective observations and cognitive status: Pt in recliner on approach, pleasant and agreeable to tx session. Did not exhibit any s/s of distress, diaphoresis, etc this session     Pain level/location:    3/10       Location: Lower back   Discharge recommendations  Anticipated discharge date:    Destination: []?home alone   []?home alone w assist prn   [x]? home w/ family    []? Continuous supervision       []? SNF    []? Assisted living     []? Other:   Continued therapy: [x]? Brandi Ingram OT  []? OUTPATIENT  OT   []? No Further OT  Equipment needs: has BSC and grab bars.  Mehul shower chair with back      Toileting:   Denied need    Bed Mobility:           [x]   Pt received out of bed   Sit --> Supine:  Supervision    Transfers:    Sit--> Stand: SBA  Stand --> Sit:   SBA  Stand-Pivot:   CGA-SBA  Other:    Assistive device required for transfer:   RW      Functional Mobility: Assistance:  CGA within room, mod I for ~50 ft x2 W/C mobility  Device:   [x]   Rolling Walker     []   Standard Walker [x]   Wheelchair        []   [employer]       []   4-Wheeled Uma Hollidaymann         []   Cardiac Walker       []   Other:        Homemaking Tasks:   Pt reports his significant other will be performing meal preparation at discharge. Therapist educated pt that if he is home alone for a brief period of time to use W/C for kitchen access; educated pt on technique to use W/C to complete kitchen tasks, importance of locking brakes, etc.  Pt able to set up W/C and perform sit<>stand c SBA to retrieve item from upper cupboard and verbalized understanding of all education. Additional Therapeutic activities/exercises completed this date:     []   ADL Training   [x]   Balance/Postural training: Pt stood x 3 min at counter with SBA while completing dynamic stand task while reaching outside base of support to facilitate increased balance for ADL tasks and transfers. [x]   Bed/Transfer Training: In prep for d/c home c tub shower combo, pt completed simulated (dry) tub/shower combo transfer using shower chair and sidestepping in/out. Pt required CGA throughout 2* unsteadiness but did not experience LOB. Educated pt repeatedly not to attempt this alone at home. Also did educate pt on tub transfer bench however pt is declining at this time. [x]   Endurance Training   []   Neuromuscular Re-ed   []   Nu-step:  Time:        Level:         #Steps:       []   Rebounder:    []  Seated     []  Standing        []   Supine Ther Ex (reps/sets):     [x]   Seated Ther Ex (reps/sets): To increase BUE endurance for ADLs and transfers pt completed 3 sets x10 reps of BUE therex c a 4# weighted bar: shoulder presses, chest presses, and elbow flexion   []   Standing Ther Ex (reps/sets):     []   Other:      Comments:   All intervention performed to increase pt's endurance, ax tolerance, balance,  and I c ADLs/IADLs and functional transfers/mobility. Patient/Caregiver Education and Training:   []   [employer] Equipment Use  [x]   Bed Mobility/Transfer Technique/Safety  []   Energy Conservation Tips  []   Family training  [x]   Postural Awareness  [x]   Safety During Functional Activities  []   Reinforced Patient's Precautions   []   Progress was updated and reviewed in Rehabtracker with patient and/or family this         date. Treatment Plan for Next Session: Continue OT POC, CGT         Treatment/Activity Tolerance:   [x] Tolerated treatment with no adverse effects    [] Patient limited by fatigue  [] Patient limited by pain   [] Patient limited by medical complications:    [] Adverse reaction to Tx:   [] Significant change in status    Safety:       [x]  bed alarm set    []  chair alarm set    []  Pt refused alarms                []  Telesitter activated      [x]  Gait belt used during tx session      []other:       Number of Minutes/Billable Intervention  Therapeutic Exercise 15   ADL Self-care    Neuro Re-Ed    Therapeutic Activity 45   Group    Other:    TOTAL 60       Social History  Social/Functional History  Lives With: Significant other  Type of Home: House  Home Layout: One level  Home Access: Ramped entrance  Bathroom Shower/Tub: Tub/Shower unit, Shower chair without back(Pt does not use shower chair - pt bathes in tub.)  Bathroom Toilet: Standard  Bathroom Equipment: Grab bars in shower  Home Equipment: Rolling walker, Cane(Pt uses rollator at baseline, also has access to cane.)  ADL Assistance: [facility]: (Rollator)  Homemaking Responsibilities: No(Pt's girlfriend performs IADLs)  Ambulation Assistance: Independent(Mod I with use of rollator)  Transfer Assistance: Independent  Active : No  Patient's  Info: Pt's girlfriend drives  Education: Some college  Occupation: Retired  Leisure & Hobbies: Martial arts  IADL Comments: Pt manages own medications.   Additional Comments: Pt sleeps on flat bed at baseline. Pt reports no falls in the past year. Objective                                                                                    Goals:  (Update in navigator)  Short term goals  Time Frame for Short term goals: STG=LTG:  Long term goals  Time Frame for Long term goals : 10-14 days  Long term goal 1: Pt will perform all grooming tasks with Mod I.  Long term goal 2: Pt will perform sponge bathing with Min A and AE/DME. Long term goal 3: Pt will perform UB dressing with Mod I.  Long term goal 4: Pt will perform LB dressing with Min A and AE/DME. Long term goal 5: Pt will don/doff footwear with Mod I and AE. Long term goals 6: Pt will complete toileting with Min A and AE/DME. Long term goal 7: Pt will complete all functional transfers (toilet, tub, bed) with Min A and DME. Long term goal 8: Pt will participate in therex/theract with emphasis on static stance >3-5 min to increase standing tolerance and endurance needed for ADLs/IADLs. :        Plan of Care                                                                              Times per week: 5 days per week for a minimum of 60 minutes/day plus group as appropriate for 60 minutes. Treatment to include Plan  Times per week: 5x/week  Times per day: Daily  Plan weeks: 10-14 days  Specific instructions for Next Treatment: LB AE education  Current Treatment Recommendations: Strengthening, Wheelchair Mobility Training, Pain Management, Positioning, ROM, Gait Training, Safety Education & Training, Balance Training, Stair training, Patient/Caregiver Education & Training, Self-Care / ADL, Functional Mobility Training, Equipment Evaluation, Education, & procurement, Home Management Training, Endurance Training    Electronically signed by   Enoch Turner MS, OTR/L  License #[id number]  12/30/2020, 9:03 AM

## 2020-12-30 NOTE — PROGRESS NOTES
Comprehensive Nutrition Assessment    Type and Reason for Visit:  Reassess    Nutrition Recommendations/Plan:   Continue carb controlled diet with oral nutrition supplement  Encourage consistent meal intake    Nutrition Assessment:  Meal intake remains % at most meals. Remains on carb controlled diet with low calorie, high protein supplement offered. Improving intake during stay, remains low nutrition risk. Malnutrition Assessment:  Malnutrition Status:  No malnutrition    Context:  Acute Illness       Estimated Daily Nutrient Needs:  Energy (kcal):  9125-4465; Weight Used for Energy Requirements:  Current     Protein (g):  81-97 (1-1.2 g/kg); Weight Used for Protein Requirements:  Ideal        Fluid (ml/day):  2000; Method Used for Fluid Requirements:  1 ml/kcal      Nutrition Related Findings:  asleep in bed darkened room      Wounds:  Surgical Incision       Current Nutrition Therapies:    Dietary Nutrition Supplements: Low Calorie High Protein Supplement  DIET GENERAL; Carb Control: 5 carb choices (75 gms)/meal    Anthropometric Measures:  · Height: 6' (182.9 cm)  · Current Body Weight: 218 lb 14.7 oz (99.3 kg)   · Admission Body Weight: 214 lb 8.1 oz (97.3 kg)    · Usual Body Weight: 206 lb (93.4 kg)(per hx past year)     · Ideal Body Weight: 178 lbs; % Ideal Body Weight 120.5 %   · BMI: 29.7  · Adjusted Body Weight:  ; No Adjustment   · BMI Categories: Overweight (BMI 25.0-29. 9)       Nutrition Diagnosis:   · Predicted inadequate energy intake related to pain as evidenced by other (comment)(limited po data, not much appetite today)      Nutrition Interventions:   Food and/or Nutrient Delivery:  Continue Current Diet, Continue Oral Nutrition Supplement  Nutrition Education/Counseling:  Education initiated   Coordination of Nutrition Care:  Continue to monitor while inpatient    Goals:  Patient will consume at least 75% at meals during stay       Nutrition Monitoring and Evaluation: Behavioral-Environmental Outcomes:  None Identified   Food/Nutrient Intake Outcomes:  Diet Advancement/Tolerance, Food and Nutrient Intake, Supplement Intake  Physical Signs/Symptoms Outcomes:  Biochemical Data, Meal Time Behavior, Skin, Weight     Discharge Planning:    Continue current diet     Electronically signed by Wilmar Prince RD, LD on 12/30/20 at 2:18 PM EST    Contact: [phone]

## 2020-12-30 NOTE — PROGRESS NOTES
Jose Alejandro Christina .    : 1950  Acct #: [de-identified]  MRN: [id number]              PM&R Progress Note      Admitting diagnosis: Lumbar spinal stenosis with radiculopathy (IGC 3.9)     Comorbid diagnoses impacting rehabilitation: Uncontrolled pain, generalized weakness, gait disturbance, acute urinary retention, essential hypertension, uncontrolled diabetes type 2 with peripheral neuropathy, COPD, status post left total knee arthroplasty.     Chief complaint: Vague complaints of \"not feeling well\". Denied chills, chest pain, fever or nausea. Prior (baseline) level of function: Independent. Current level of function:         Current  [facility]F-[facility] and Goals:   Hearing, Speech, and Vision  Expression of Ideas and Wants: Without difficulty  Understanding Verbal and Non-Verbal Content: Understands  Prior Functioning: Everyday Activities  Self Care: Independent  Indoor Mobility (Ambulation): Independent  Stairs: Independent  Functional Cognition: Independent  Prior Device Use: Walker    Eating  Assistance Needed: Independent  Comment: Per pt report  CARE Score: 6  Oral Hygiene  Assistance Needed: Setup or clean-up assistance  Comment: Pt performed oral care w/c level with setup. CARE Score: 5  Discharge Goal: Independent  Toileting Hygiene  Assistance Needed: Dependent  Reason if not Attempted: Not attempted due to medical condition or safety concerns(Cordero catheter)  CARE Score: 1  Discharge Goal: Partial/moderate assistance  Shower/Bathe Self  Assistance Needed: Substantial/maximal assistance, Partial/moderate assistance  Comment: Pt performed sponge bathing w/c level with Mod A to wash anterior/posterior vikki area and distal BLE 2/2 spinal precautions. Discharge Goal: Partial/moderate assistance  Upper Body Dressing  Assistance Needed: Partial/moderate assistance  Comment: Pt doffed/donned shirt with SBA, however req Max A to don TLSO, requiring Mod A overall.   CARE Score: 3  Discharge Goal: Independent  Lower Body Dressing  Assistance Needed: Dependent  Comment: Don/doff pants Dep x2 with use of Laurelyn Ponto for STS. Pt unable to thread LE 2/2 sternal precautions. CARE Score: 1  Discharge Goal: Partial/moderate assistance  Putting On/Taking Off Footwear  Assistance Needed: Dependent  Comment: Dep to don/doff socks, pt demo poor knee extension to assist with lifting LE. CARE Score: 1  Discharge Goal: Independent    Roll Left and Right  Assistance Needed: Partial/moderate assistance  CARE Score: 3  Discharge Goal: Independent  Sit to Lying  Assistance Needed: Partial/moderate assistance  CARE Score: 3  Discharge Goal: Independent  Sit to Stand  Assistance Needed: Dependent  Comment: stedy  CARE Score: 1  Discharge Goal: Partial/moderate assistance  Chair/Bed-to-Chair Transfer  Assistance Needed: Dependent  Comment: steady  CARE Score: 1  Discharge Goal: Partial/moderate assistance  Toilet Transfer  Assistance Needed: Dependent  Comment: Dep x2 toilet transfer with use of Laurelyn Ponto. CARE Score: 1  Discharge Goal: Partial/moderate assistance  Car Transfer  Comment: not safe to attempt at this time due to patient dependent x2 with use of stedy for sit <> stand transfers  Reason if not Attempted: Not attempted due to medical condition or safety concerns  CARE Score: 88  Discharge Goal: Partial/moderate assistance   Walk 10 Feet?   Walk 10 Feet?: No  1 Step  Reason if not Attempted: Not attempted due to medical condition or safety concerns  Picking Up Object  Comment: not safe to attempt at this time due to patient dependent x2 with use of stedy for sit <> stand transfers  Reason if not Attempted: Not attempted due to medical condition or safety concerns  CARE Score: 88  Discharge Goal: Partial/moderate assistance  Wheelchair Ability  Uses a Wheelchair and/or Scooter?: Yes  Wheel 50 Feet with Two Turns  Assistance Needed: Supervision or touching assistance  Physical Assistance Level: No physical assistance  CARE Score: 4  Discharge Goal: Independent  Wheel 150 Feet  Assistance Needed: Substantial/maximal assistance  Physical Assistance Level: 50%-74%  Comment: patient fatigued with 50 feet of w/c propulsion and required assistance to propel manual w/c remaining distance  CARE Score: 2  Discharge Goal: Independent            I      Exam:    Blood pressure (!) 156/74, pulse 93, temperature 98.1 °F (36.7 °C), temperature source Oral, resp. rate 18, height 6' (1.829 m), weight 219 lb (99.3 kg), SpO2 98 %. General: Lying back in bed. Poor engagement in my visit. HEENT: Mumbling some speech. No JVD. Neck supple. Pulmonary: Shallow respirations without rhonchi. Cardiac: Regular rate and rhythm. Abdomen: Patient's abdomen is soft and nondistended. Bowel sounds were present throughout. There was no rebound, guarding or masses noted. Upper extremities: No new bruising or swelling. Lower extremities: No signs of DVT. Sitting balance was good. Standing balance was poor. Lab Results   Component Value Date    WBC 8.5 05/26/2017    HGB 14.6 05/26/2017    HCT 46.5 05/26/2017    MCV 88.1 05/26/2017     05/26/2017     Lab Results   Component Value Date    INR 1.06 02/09/2011    PROTIME 11.6 02/09/2011     Lab Results   Component Value Date    CREATININE 0.9 10/19/2017    BUN 12 10/19/2017     10/19/2017    K 4.5 10/19/2017     10/19/2017    CO2 26 10/19/2017     Lab Results   Component Value Date    ALT 30 08/16/2018    AST 22 08/16/2018    ALKPHOS 110 08/16/2018    BILITOT 0.3 08/16/2018       Expected length of stay  prior to a supervised level of function for discharge home with a walker and Brandi 79 OT/PT is 1/5/2021. Recommendations:    1. Lumbar spinal stenosis with radiculopathy and gait disturbance:     Vague complaints today limited his participation in the daily occupational and physical therapy.  We will check some screening labs.   Cordero catheter out now without any signs of retention.   He uses pain fatigue as reasons not to try activity.  Ongoing aggressive pain management, DVT prophylaxis and pulmonary hygiene.  Voiding trial progressing well.  He is always wearing an LSO when out of bed.  Weightbearing as tolerated per his surgeon. Marsha Annita his wound for infection.    Refused to get out of bed today.     2. DVT prophylaxis: With recent spinal surgery he has relative contraindications to chemoprophylaxis.  SCDs when in bed.  Weightbearing activities are limited but are tried daily.    No current signs of thrombosis. 3. Acute urinary retention: Cordero catheter has been removed. He is voiding fair. Continue nightly Flomax. 4. Unnd oral analgesics.  Tolerating the scheduled Tylenol 4 times daily and have oxycodone available as needed.  Flexeril as needed. Aggressive bowel intervention while on the narcotics. 5. Uncontrolled diabetes with hyperglycemia: CCD diet.  Blood sugars will be checked at before meals and at bedtime.  Oral Metformin and sliding scale Humalog.  Blood sugars generally less than 160.  6. Hypertension: Lopressor for blood pressure control.  Target systolic blood pressure is 120-140.  Vital signs are checked at rest and with activity. 7. COPD: Aggressive pulmonary hygiene.  Monitoring O2 saturations as symptoms dictate.  Albuterol inhaler has not been needed    Counseling and Coordination of Care: In care conference today I met with the patient's OT, PT, RN and . We discussed the patient's problems, progress and prognosis. Disposition issues were clarified and plans were established for ongoing rehabilitation efforts beyond the ARU stay. I reviewed this information with the patient during a second distinct visit with the patient. More than half of the total time of 34 minutes spent with the patient involved counseling and coordination of care.

## 2020-12-30 NOTE — PROGRESS NOTES
Physical Therapy  [x] daily progress note       [] discharge       Patient Name:  Elliott Tran   :  1950 MRN: [id number]  Room:  [address] [place], [place] [address] Date of Admission: 2020  Rehabilitation Diagnosis:   Spinal stenosis, lumbar region with neurogenic claudication [M48.062]  Spinal stenosis of lumbar region with radiculopathy [M48.061, M54.16]       Date 2020       Day of ARU Week:  6   Time IN//934   Individual Tx Minutes 63   Group Tx Minutes    Co-Treat Minutes    Concurrent Tx Minutes    TOTAL Tx Time Mins 63   Variance Time +3   Variance Time []   Refusal due to:     []   Medical hold/reason:    []   Illness   []   Off Unit for test/procedure  [x]   Extra time needed to complete task  []   Therapeutic need  []   Other (specify):   Restrictions Restrictions/Precautions  Restrictions/Precautions: Weight Bearing, Fall Risk, General Precautions(B LE WBAT, back brace on when up)  Required Braces or Orthoses?: Yes  Position Activity Restriction  Spinal Precautions: No Bending, No Lifting, No Twisting  Spinal Precautions: Educated pt on precautions, pt verbalized understanding. Other position/activity restrictions: Wound vac on lumbar spine, newell catheter. Interdisciplinary communication [x]   Cleared for therapy per nursing     [x]   RN notified about issues during session: Upon sitting EOB pt began to c/o dizziness, Vitals WNL. Progressed to gait with pt becoming diaphoretic and \"just not feeling myself\". Returned pt to supine which elicited more dizziness. []   RN updated on pt performance  []   Spoke with   []   Spoke with OT  []   Spoke with MD  []   Other:    Subjective observations and cognitive status: Pt in bed, agreeable to therapy.  Pt states he is feeling better than yesterday, though as treatment progressed pt began to have dizziness and diaphoresis   Pain level/location:   0 /10       Location:    Discharge recommendations  Anticipated discharge date: 1/5  Destination: []home alone   []home alone with assist PRN     [x] home w/ family      [] Continuous supervision  []SNF    [] Assisted living     [] Other:  Continued therapy: [x]HHC PT  []OUTPATIENT  PT   [] No Further PT  []SNF PT  Caregiver training recommended: [x]Yes  [] No   Equipment needs: Sandroseth Barron the assistance of a wheeled walker to successfully ambulate from room to room at home to allow completion of daily living tasks such as: bathing, toileting, dressing and grooming.  A wheeled walker is necessary due to the patient's unsteady gait, upper body weakness, inability to  a standard walker.  This patient can ambulate only by pushing a walker instead of using a lesser assistive device such as a cane or crutch. Sandro Barron a standard wheelchair to successfully complete daily living tasks such as: bathing, toileting, dressing and grooming; or any other daily living task in the home.  A standard manual wheelchair is necessary due to patient's impaired ambulation and mobility restrictions and would be unable to resolve these daily living tasks using a cane or walker.  The patient is capable of using/self-propelling a standard wheelchair safely in their home and can maneuver within their home with adequate access in a reasonable amount of time. The patient has not expressed an unwillingness to use the wheelchair. Having this wheelchair would enable Liam Guerra Sr. ability to regularly participate in the above mentioned daily living tasks around the home. There is a caregiver available to provide necessary assistance.    Expected length of need is lifetime.    Delilah Mackenzie Sr. does not have any infectious diagnoses. Delilah Mackenzie Sr. can self propel a wheelchair and needs anti-rollback device to propel up ramps.      Bed Mobility:           []   Pt received out of bed   Rolling R/L: Supervision with 20% cues for back precaution when no use of rails  Scooting: Mod I  Lying --> Sit:  Supervision with 20% cues for back precaution when no use of rails  Sit --> lying:  Supervision with 20% cues for back precaution when no use of rails  Bed features used: [] Yes  [] No       Transfers:    Sit--> Stand:  2 CGA, 2 SBA  Stand --> Sit:   SBA with cues for control in descent  Chair-->Bed/Bed --> Chair:   CG  Assistive device required for transfer:   2ww    Gait:    Distance:  76', 51'   Assistance: CGA  Device:  2ww  Gait Quality:  Pt initially able to walk with continuous steps, then became tired, leaning more on UE with less stable control of walker, but pt making no changes in his gait. Therapist directed pt to stop. Re-educated in therapy recommendation that he use a 3 point gait pattern when beginning to heavily lean on walker to reduce risk of fall. Wheelchair Propulsion:  Distance:  100'   Assistance: Mod I  Extremities Used:   BUE/LE  Type:    [x]  Manual        []  Electric       Additional Therapeutic activities/exercises completed this date:     []   Nu-step:  Time:        Level:         #Steps:       []   Rebounder:    []  Seated     []  Standing        []   Balance training         []   Postural training    [x]   Supine ther ex (reps/sets): B LE abd/add 10 x3, heel slides with heavy tband resistance into extension 10 x2, PF with heavy tband RLE, light tband with pt unable to push through full ROM 10 x1, DF with medium tband 10 x2, pelvic tilts 10 x1 with 5 second hold, pelvic tilt with slight elevation of foot off bed 5x1(abdominal fatigue)   []   Seated ther ex (reps/sets):     []   Standing ther ex (reps/sets):     []   Picking up object from floor (standing):                   []   Reacher used   []   Other:   []   Other:    Comments:  Pt states last night he got up on his own and walked to the bathroom. Reinforced to pt that he has a high fall risk due to his weakness as well as recent dizziness issues.  Pt verbalizes  Understanding with reluctance. Educated that these safety issues are why caregiver training was requested. Patient/Caregiver Education and Training:   []   Role of PT  [x]   Education about Dx: pt feels LLE weakness is from knee surgery, but sx are more consistent with back issues  [x]   Use of call light for assist   []   Wheelchair mobility/management  []   HEP provided and explained   [x]   Treatment plan reviewed  [x]   Home safety  [x]   Body mechanics  []   Positioning  [x]   Bed Mobility/Transfer technique  [x]   Gait technique/sequencing  [x]   Proper use of assistive device/adaptive equipment  []   Stair training/Advanced mobility safety and technique  [x]   Reinforced patient's precautions/mobility while maintaining precautions  []   Postural awareness  []   Family/caregiver training  []   Progress was updated and reviewed in Rehabtracker with patient and/or family this date. []   Other:      Treatment Plan for Next Session: demonstration of floor to chair transfers, progression of mobility as able    Assessment: Pt continues to have dizziness and today diaphoresis with exertion.       Treatment/Activity Tolerance:   [] Tolerated treatment with no adverse effects    [x] Patient limited by fatigue  [] Patient limited by pain   [x] Patient limited by medical complications:    [] Adverse reaction to Tx:   [] Significant change in status    Barrier/s to progress/learning:   []   None  [x]   Cognition  []   Hearing deficit  []   Pre-morbid mental/psychological status   []   Motivation  []   Communication  []   Anxiety  []   Vision deficit  []   Attention  []   Other:      Safety:       [x]  bed alarm set    []  chair alarm set    []  Pt refused alarms                []  Telesitter activated      [x]  Gait belt used during tx session      []other:         Number of Minutes/Billable Intervention  Gait Training 15   Therapeutic Exercise 30   Neuro Re-Ed    Therapeutic Activity 18   Wheelchair Propulsion    Group    Other: TOTAL 63         Social History  Social/Functional History  Lives With: Significant other  Type of Home: House  Home Layout: One level  Home Access: Ramped entrance  Bathroom Shower/Tub: Tub/Shower unit, Shower chair without back(Pt does not use shower chair - pt bathes in tub.)  Bathroom Toilet: Standard  Bathroom Equipment: Grab bars in [address]: Rolling walker, Cane(Pt uses rollator at baseline, also has access to cane.)  ADL Assistance: [address]: (Rollator)  Homemaking Responsibilities: No(Pt's girlfriend performs IADLs)  Ambulation Assistance: Independent(Mod I with use of rollator)  Transfer Assistance: Independent  Active : No  Patient's  Info: Pt's girlfriend drives  Education: Some college  Occupation: Retired  Leisure & Hobbies: Martial arts  IADL Comments: Pt manages own medications. Additional Comments: Pt sleeps on flat bed at baseline. Pt reports no falls in the past year. Objective                                                                                    Goals:  (Update in navigator)   : Long term goals  Time Frame for Long term goals : In 10 days:  Long term goal 1: Pt will complete all bed mobility independently  Long term goal 2: Pt will complete sit <> stand transfers with modAx1  Long term goal 3: Pt will ambulate 20 feet with modAx2 with LRAD  Long term goal 4: Pt will propel manual w/c 150 feet independently  Long term goal 5: Pt will independently complete 3 sets of 10 reps of BLE AROM exercises in available and allowed ROM:        Plan of Care                                                                              Times per week: 5 days per week for a minimum of 60 minutes/day plus group as appropriate for 60 minutes.   Treatment to include Current Treatment Recommendations: Strengthening, ROM, Balance Training, Transfer Training, ADL/Self-care Training, Functional Mobility Training, IADL Training, Neuromuscular Re-education, Safety Education & Training, Home Exercise Program, Endurance Training, Patient/Caregiver Education & Training, Equipment Evaluation, Education, & procurement, Gait Training, Pain Management, Positioning, Wheelchair Mobility Training, Stair training    Electronically signed by   Han Ayala  12/30/2020, 8:40 AM

## 2020-12-30 NOTE — PROGRESS NOTES
Physical Therapy      [x] daily progress note       [] discharge       Patient Name:  Radha Bernal   :  1950 MRN: [id number]  Room:  [address] [place], [place] [address] Date of Admission: 2020  Rehabilitation Diagnosis:   Spinal stenosis, lumbar region with neurogenic claudication [M48.062]  Spinal stenosis of lumbar region with radiculopathy [M48.061, M54.16]       Date 2020       Day of ARU Week:  6   Time IN/OUT 5235-4963   Individual Tx Minutes 57   TOTAL Tx Time Mins 57   Variance Time    Variance Time []   Refusal due to:     []   Medical hold/reason:    []   Illness   []   Off Unit for test/procedure  []   Extra time needed to complete task  []   Therapeutic need  []   Other (specify):   Restrictions Restrictions/Precautions  Restrictions/Precautions: Weight Bearing, Fall Risk, General Precautions(B LE WBAT, back brace on when up)  Required Braces or Orthoses?: Yes  Position Activity Restriction  Spinal Precautions: No Bending, No Lifting, No Twisting  Spinal Precautions: Educated pt on precautions, pt verbalized understanding. Other position/activity restrictions: Wound vac on lumbar spine, newell catheter. Interdisciplinary communication [x]   Cleared for therapy per nursing     []   RN notified about issues during session  []   RN updated on pt performance  []   Spoke with   []   Spoke with OT  []   Spoke with MD  []   Other:    Subjective observations and cognitive status: Pt resting in bed agreeable to session     Pain level/location:  0 /10 at rest       Location:    Discharge recommendations  Anticipated discharge date:    Destination: []?????home alone   []?????home alone with assist PRN     [x]????? home w/ friend Maryjane      [x]????? Continuous supervision  []??? ??SNF    []????? Assisted living     []????? Other:  Continued therapy: [x]??? ? ?[facility] AT [facility] PT  []?????OUTPATIENT  PT   []????? No Further PT  []??? ??SNF PT  Caregiver training recommended: []??? ? ? Yes  []????? No   Equipment needs: RW and manual WC  Murrel Schirmer Sr. requires the assistance of a wheeled walker to successfully ambulate from room to room at home to allow completion of daily living tasks such as: bathing, toileting, dressing and grooming.  A wheeled walker is necessary due to the patient's unsteady gait, upper body weakness, inability to  a standard walker.  This patient can ambulate only by pushing a walker instead of using a lesser assistive device such as a cane or crutch. Elias Papaaloa a standard wheelchair to successfully complete daily living tasks such as: bathing, toileting, dressing and grooming; or any other daily living task in the home.  A standard manual wheelchair is necessary due to patient's impaired ambulation and mobility restrictions and would be unable to resolve these daily living tasks using a cane or walker.  The patient is capable of using/self-propelling a standard wheelchair safely in their home and can maneuver within their home with adequate access in a reasonable amount of time. The patient has not expressed an unwillingness to use the wheelchair. Having this wheelchair would enable Liam Mendoza Sr. ability to regularly participate in the above mentioned daily living tasks around the home. There is a caregiver available to provide necessary assistance.    Expected length of need is lifetime.    Murrel Schirmer Sr. does not have any infectious diagnoses. Murrel Schirmer Sr. can self propel a wheelchair and needs anti-rollback device to propel up ramps.      Bed Mobility:           []   Pt received out of bed   Rolling R/L:  Supervision, demonstrated log roll technique without bed featues  Scooting:  Indep  Lying --> Sit:  Supervision with cues for log roll technique  Sit --> lying:  Indep  Bed features used: [] Yes  [x] No       Transfers:     Sit--> Stand:  SBA, max cueing for safety with pushing up from surface vs pulling on RW; one occasion req cues for having RW in front of him before attempting to stand  Stand --> Sit:   SBA, cueing to reach back with at least one hand, lining up to chair  Chair-->Bed/Bed --> Chair:   SBA  Assistive device required for transfer:  RW    Gait:    Distance:  97'+164'   Assistance:  SBA  Device:  RW  Gait Quality:  recip pattern with max UE WB on AD, flexed posture, mildly unsteady with turns      Additional Therapeutic activities/exercises completed this date:     [x]   Nu-step:  Time:6' 9\" + 4' 40\"        Level:  4, UEs only first trial, then LEs and UEs lengthened to avoid trunk rotation second trial       #Steps: 682+637      []   Rebounder:    []  Seated     []  Standing        []   Balance training         []   Postural training    []   Supine ther ex (reps/sets):     []   Seated ther ex (reps/sets):     []   Standing ther ex (reps/sets):     []   Picking up object from floor (standing):                   []   Reacher used   [x]   Other:WC obstacle course 20' x 2, Mod I navigating obstacles in small spaces fwd, Supervision backing    [x]   Other:Min A to align brace to don, req cues to tighten prior to standing. Able to doff without A        Patient/Caregiver Education and Training:   [x]   Role of PT  []   Education about Dx  [x]   Use of call light for assist   [x]   Wheelchair mobility/management  []   HEP provided and explained   [x]   Treatment plan reviewed  [x]   Home safety  []   Body mechanics  []   Positioning  [x]   Bed Mobility/Transfer technique  [x]   Gait technique/sequencing  [x]   Proper use of assistive device/adaptive equipment  []   Stair training/Advanced mobility safety and technique  [x]   Reinforced patient's precautions/mobility while maintaining precautions  [x]   Postural awareness  []   Family/caregiver training  [x]   Progress was updated and reviewed in Rehabtracker with patient and/or family this date.   []   Other:      Treatment Plan for Next Session: education with floor to stand transfers, gait progression, LE and DLS therx per handout, bed mobility with focus on spinal precautions. Treatment/Activity Tolerance:   [x] Tolerated treatment with no adverse effects    [] Patient limited by fatigue  [] Patient limited by pain   [] Patient limited by medical complications:    [] Adverse reaction to Tx:   [] Significant change in status    Barrier/s to progress/learning:   []   None  [x]   Cognition  []   Hearing deficit  []   Pre-morbid mental/psychological status   []   Motivation  []   Communication  []   Anxiety  []   Vision deficit  []   Attention  []   Other:      Safety:       [x]  bed alarm set    []  chair alarm set    []  Pt refused alarms                []  Telesitter activated      [x]  Gait belt used during tx session      []other:         Number of Minutes/Billable Intervention  Gait Training 20   Therapeutic Exercise 10   Neuro Re-Ed    Therapeutic Activity 12   Wheelchair Propulsion 15   Group    Other:    TOTAL 57         Social History  Social/Functional History  Lives With: Significant other  Type of Home: House  Home Layout: One level  Home Access: Ramped entrance  Bathroom Shower/Tub: Tub/Shower unit, Shower chair without back(Pt does not use shower chair - pt bathes in tub.)  Bathroom Toilet: Standard  Bathroom Equipment: Grab bars in [address] [place]: Rolling walker, Cane(Pt uses rollator at baseline, also has access to cane.)  ADL Assistance: Independent  Homemaking Assistance: (Rollator)  Homemaking Responsibilities: No(Pt's girlfriend performs IADLs)  Ambulation Assistance: Independent(Mod I with use of rollator)  Transfer Assistance: Independent  Active : No  Patient's  Info: Pt's girlfriend drives  Education: Some college  Occupation: Retired  Leisure & Hobbies: Martial arts  IADL Comments: Pt manages own medications. Additional Comments: Pt sleeps on flat bed at baseline. Pt reports no falls in the past year.     Objective Goals:  (Update in navigator)   : Long term goals  Time Frame for Long term goals : In 10 days:  Long term goal 1: Pt will complete all bed mobility independently  Long term goal 2: Pt will complete sit <> stand transfers with modAx1  Long term goal 3: Pt will ambulate 20 feet with modAx2 with LRAD  Long term goal 4: Pt will propel manual w/c 150 feet independently  Long term goal 5: Pt will independently complete 3 sets of 10 reps of BLE AROM exercises in available and allowed ROM:        Plan of Care                                                                              Times per week: 5 days per week for a minimum of 60 minutes/day plus group as appropriate for 60 minutes.   Treatment to include Current Treatment Recommendations: Strengthening, ROM, Balance Training, Transfer Training, ADL/Self-care Training, Functional Mobility Training, IADL Training, Neuromuscular Re-education, Safety Education & Training, Home Exercise Program, Endurance Training, Patient/Caregiver Education & Training, Equipment Evaluation, Education, & procurement, Gait Training, Pain Management, Positioning, Wheelchair Mobility Training, Stair training    Electronically signed by   Emre Lockhart, PTA #[id number]  12/30/2020, 2:53 PM

## 2020-12-31 LAB
GLUCOSE BLD-MCNC: 116 MG/DL (ref 70–99)
GLUCOSE BLD-MCNC: 117 MG/DL (ref 70–99)
GLUCOSE BLD-MCNC: 152 MG/DL (ref 70–99)

## 2020-12-31 PROCEDURE — 6370000000 HC RX 637 (ALT 250 FOR IP): Performed by: NURSE PRACTITIONER

## 2020-12-31 PROCEDURE — 97530 THERAPEUTIC ACTIVITIES: CPT

## 2020-12-31 PROCEDURE — 94761 N-INVAS EAR/PLS OXIMETRY MLT: CPT

## 2020-12-31 PROCEDURE — 99232 SBSQ HOSP IP/OBS MODERATE 35: CPT | Performed by: PHYSICAL MEDICINE & REHABILITATION

## 2020-12-31 PROCEDURE — 94150 VITAL CAPACITY TEST: CPT

## 2020-12-31 PROCEDURE — 97110 THERAPEUTIC EXERCISES: CPT

## 2020-12-31 PROCEDURE — 82962 GLUCOSE BLOOD TEST: CPT

## 2020-12-31 PROCEDURE — 1280000000 HC REHAB R&B

## 2020-12-31 PROCEDURE — 6370000000 HC RX 637 (ALT 250 FOR IP): Performed by: PHYSICAL MEDICINE & REHABILITATION

## 2020-12-31 RX ADMIN — STANDARDIZED SENNA CONCENTRATE 8.6 MG: 8.6 TABLET ORAL at 21:08

## 2020-12-31 RX ADMIN — OXYCODONE HYDROCHLORIDE 10 MG: 5 TABLET ORAL at 14:05

## 2020-12-31 RX ADMIN — POLYETHYLENE GLYCOL (3350) 17 G: 17 POWDER, FOR SOLUTION ORAL at 08:00

## 2020-12-31 RX ADMIN — OXYCODONE HYDROCHLORIDE 10 MG: 5 TABLET ORAL at 08:00

## 2020-12-31 RX ADMIN — ACETAMINOPHEN 1000 MG: 500 TABLET ORAL at 21:08

## 2020-12-31 RX ADMIN — METOPROLOL TARTRATE 50 MG: 50 TABLET, FILM COATED ORAL at 08:00

## 2020-12-31 RX ADMIN — DOCUSATE SODIUM 100 MG: 100 CAPSULE ORAL at 08:00

## 2020-12-31 RX ADMIN — ACETAMINOPHEN 1000 MG: 500 TABLET ORAL at 12:22

## 2020-12-31 RX ADMIN — OXYCODONE HYDROCHLORIDE 10 MG: 5 TABLET ORAL at 00:49

## 2020-12-31 RX ADMIN — METFORMIN HYDROCHLORIDE 500 MG: 500 TABLET ORAL at 17:37

## 2020-12-31 RX ADMIN — PRAVASTATIN SODIUM 40 MG: 40 TABLET ORAL at 21:08

## 2020-12-31 RX ADMIN — ACETAMINOPHEN 1000 MG: 500 TABLET ORAL at 08:00

## 2020-12-31 RX ADMIN — OXYCODONE HYDROCHLORIDE 10 MG: 5 TABLET ORAL at 21:08

## 2020-12-31 RX ADMIN — METOPROLOL TARTRATE 50 MG: 50 TABLET, FILM COATED ORAL at 21:08

## 2020-12-31 RX ADMIN — ACETAMINOPHEN 1000 MG: 500 TABLET ORAL at 17:37

## 2020-12-31 RX ADMIN — ASPIRIN 81 MG CHEWABLE TABLET 81 MG: 81 TABLET CHEWABLE at 08:00

## 2020-12-31 ASSESSMENT — PAIN DESCRIPTION - PROGRESSION
CLINICAL_PROGRESSION: GRADUALLY IMPROVING
CLINICAL_PROGRESSION: GRADUALLY WORSENING

## 2020-12-31 ASSESSMENT — PAIN DESCRIPTION - PAIN TYPE: TYPE: ACUTE PAIN;SURGICAL PAIN

## 2020-12-31 ASSESSMENT — PAIN DESCRIPTION - ONSET: ONSET: ON-GOING

## 2020-12-31 ASSESSMENT — PAIN DESCRIPTION - LOCATION: LOCATION: BACK

## 2020-12-31 ASSESSMENT — PAIN SCALES - GENERAL
PAINLEVEL_OUTOF10: 6
PAINLEVEL_OUTOF10: 8
PAINLEVEL_OUTOF10: 2

## 2020-12-31 NOTE — PROGRESS NOTES
Alicia Anurag Hurtado.    : 1950  Acct #: [de-identified]  MRN: [id number]              PM&R Progress Note      Admitting diagnosis: Lumbar spinal stenosis with radiculopathy (IGC 3.9)     Comorbid diagnoses impacting rehabilitation: Uncontrolled pain, generalized weakness, gait disturbance, acute urinary retention, essential hypertension, uncontrolled diabetes type 2 with peripheral neuropathy, COPD, status post left total knee arthroplasty. Chief complaint: Sciatic pain on the left through the night interrupt his sleep and he does not feel like doing therapy today. Couple isolated episodes of \"sweating\". We did not document any vital sign or blood sugar irregularities then. Prior (baseline) level of function: Independent. Current level of function:         Current  IRF-MIRE[place] and Goals:   Hearing, Speech, and Vision  Expression of Ideas and Wants: Without difficulty  Understanding Verbal and Non-Verbal Content: Understands  Prior Functioning: Everyday Activities  Self Care: Independent  Indoor Mobility (Ambulation): Independent  Stairs: Independent  Functional Cognition: Independent  Prior Device Use: Walker    Eating  Assistance Needed: Independent  Comment: Per pt report  CARE Score: 6  Oral Hygiene  Assistance Needed: Setup or clean-up assistance  Comment: Pt performed oral care w/c level with setup. CARE Score: 5  Discharge Goal: Independent  Toileting Hygiene  Assistance Needed: Dependent  Reason if not Attempted: Not attempted due to medical condition or safety concerns(Cordero catheter)  CARE Score: 1  Discharge Goal: Partial/moderate assistance  Shower/Bathe Self  Assistance Needed: Substantial/maximal assistance, Partial/moderate assistance  Comment: Pt performed sponge bathing w/c level with Mod A to wash anterior/posterior vikki area and distal BLE 2/2 spinal precautions.   Discharge Goal: Partial/moderate assistance  Upper Body Dressing  Assistance Needed: Partial/moderate assistance  Comment: Pt doffed/donned shirt with SBA, however req Max A to don TLSO, requiring Mod A overall. CARE Score: 3  Discharge Goal: Independent  Lower Body Dressing  Assistance Needed: Dependent  Comment: Don/doff pants Dep x2 with use of Natanael Shack for STS. Pt unable to thread LE 2/2 sternal precautions. CARE Score: 1  Discharge Goal: Partial/moderate assistance  Putting On/Taking Off Footwear  Assistance Needed: Dependent  Comment: Dep to don/doff socks, pt demo poor knee extension to assist with lifting LE. CARE Score: 1  Discharge Goal: Independent    Roll Left and Right  Assistance Needed: Partial/moderate assistance  CARE Score: 3  Discharge Goal: Independent  Sit to Lying  Assistance Needed: Partial/moderate assistance  CARE Score: 3  Discharge Goal: Independent  Sit to Stand  Assistance Needed: Dependent  Comment: stedy  CARE Score: 1  Discharge Goal: Partial/moderate assistance  Chair/Bed-to-Chair Transfer  Assistance Needed: Dependent  Comment: steady  CARE Score: 1  Discharge Goal: Partial/moderate assistance  Toilet Transfer  Assistance Needed: Dependent  Comment: Dep x2 toilet transfer with use of Natanael Shack. CARE Score: 1  Discharge Goal: Partial/moderate assistance  Car Transfer  Comment: not safe to attempt at this time due to patient dependent x2 with use of stedy for sit <> stand transfers  Reason if not Attempted: Not attempted due to medical condition or safety concerns  CARE Score: 88  Discharge Goal: Partial/moderate assistance   Walk 10 Feet?   Walk 10 Feet?: No  1 Step  Reason if not Attempted: Not attempted due to medical condition or safety concerns  Picking Up Object  Comment: not safe to attempt at this time due to patient dependent x2 with use of stedy for sit <> stand transfers  Reason if not Attempted: Not attempted due to medical condition or safety concerns  CARE Score: 88  Discharge Goal: Partial/moderate assistance  Wheelchair Ability  Uses a Wheelchair and/or Scooter?: Yes  Wheel 50 Feet BILITOT 0.3 08/16/2018       Expected length of stay  prior to a supervised level of function for discharge home with a walker and Brandi Ingram OT/PT is 12/5/2020. Recommendations:    1. Lumbar spinal stenosis with radiculopathy and gait disturbance:   Avoiding therapy today with complaints of pain. Overall he is benefiting from the daily occupational and physical therapy. Johanny Hernandez has symptoms from his radicular injury. Although he complains of pain, he did not take any narcotic after 4:50 PM last night. He did not get his at bedtime Tylenol from nursing.  We must do better with pain management, DVT prophylaxis and pulmonary hygiene.    Voiding well after removal of his Cordero (on Flomax).     Using an LSO when out of bed.  Weightbearing as tolerated per his surgeon. No signs of wound infection.  Stand pivot transfers with a walker today. I spoke with his neurosurgeon today. We will take out his staples on postop day 20 (today). 2. DVT prophylaxis: With recent spinal surgery he has relative contraindications to chemoprophylaxis.     Using SCDs when in bed.  Weightbearing activities are increasing daily.  Monitoring him for signs of thrombosis. 3. Acute urinary retention: Cordero catheter was removed and his voiding trial has been successful.  Continue Flomax. No dysuria or incontinence. 4. Uncontrolled pain: Cryotherapy, LSO and oral analgesics.  Benefiting from the scheduled Tylenol 4 times daily and have oxycodone available as needed.  Flexeril as needed. Aggressive bowel intervention while on the narcotics. 5. Uncontrolled diabetes with hyperglycemia: CCD diet.  Blood sugars are checked at before meals and at bedtime.  Oral Metformin and sliding scale Humalog. Recent blood sugars are consistently less than 150. Blood sugars twice daily. 6. Hypertension: Lopressor for blood pressure control.  Target systolic blood pressure is 120-140.  Vital signs are checked at rest and with activity.   Blood pressure in target range recently. 7. COPD: Aggressive pulmonary hygiene.  Monitoring O2 saturations as symptoms dictate.  Albuterol inhaler as needed.       Jose Alejandro Christina Sr. requires the assistance of a wheeled walker to successfully ambulate from room to room at home to allow completion of daily living tasks such as: bathing, toileting, dressing and grooming.  A wheeled walker is necessary due to the patient's unsteady gait, upper body weakness, inability to  a standard walker.  This patient can ambulate only by pushing a walker instead of using a lesser assistive device such as a cane or crutch.      Liam Potter Stalling a standard wheelchair to successfully complete daily living tasks such as: bathing, toileting, dressing and grooming; or any other daily living task in the home.  A standard manual wheelchair is necessary due to patient's impaired ambulation and mobility restrictions and would be unable to resolve these daily living tasks using a cane or walker.  The patient is capable of using/self-propelling a standard wheelchair safely in their home and can maneuver within their home with adequate access in a reasonable amount of time. The patient has not expressed an unwillingness to use the wheelchair. Having this wheelchair would enable Liam Bhandari Sr. ability to regularly participate in the above mentioned daily living tasks around the home. There is a caregiver available to provide necessary assistance.    Expected length of need is lifetime.    Jose Alejandro Christina Sr. does not have any infectious diagnoses. Jose Alejandro Christina Sr. can self propel a wheelchair and needs anti-rollback device to propel up ramps.

## 2020-12-31 NOTE — FLOWSHEET NOTE
[x] daily progress note       [] discharge       Patient Name:  Darryl Butts   :  1950 MRN: [id number]  Room:  [address] [place], [place] [address] Date of Admission: 2020  Rehabilitation Diagnosis:   Spinal stenosis, lumbar region with neurogenic claudication [M48.062]  Spinal stenosis of lumbar region with radiculopathy [M48.061, M54.16]       Date 2020       Day of ARU Week:  7   Time IN/OUT 0113-4382   Individual Tx Minutes 20   TOTAL Tx Time Mins 20   Variance Time +20 minutes   Variance Time []   Refusal due to:     []   Medical hold/reason:    []   Illness   []   Off Unit for test/procedure  []   Extra time needed to complete task  []   Therapeutic need  [x]   Other (specify): Make-up minutes   Restrictions Restrictions/Precautions  Restrictions/Precautions: Weight Bearing, Fall Risk, General Precautions(B LE WBAT, back brace on when up)  Required Braces or Orthoses?: Yes  Position Activity Restriction  Spinal Precautions: No Bending, No Lifting, No Twisting  Spinal Precautions: Educated pt on precautions, pt verbalized understanding. Other position/activity restrictions: Wound vac on lumbar spine, newell catheter. Communication with other providers: [x]   OK to see per nursing:     [x]   Spoke with  Tawanna Lechuga) to notify  Uzma Schulz) to call pt's girlfriend to set up caregiver training. Subjective observations and cognitive status: Pt seen in semi-burgess's position upon entering room. Pt refused therapy citing \"I just need to play it cool today. I have been getting dizzy when I get up, and I don't want something bad to happen. I feel fine now, but I don't want to chance it. I am heading home soon, and I don't want to screw it up. \" pt did request to sit in chair. Checked vitals: Semi-burgess's position BP: 152/77; HR 76 bpm; O2 sats 99% Sitting in recliner BP: 152/74. Pt reported no dizziness or vertigo during transfer.     Pain level/location:    No complaints of pain   Discharge recommendations  Anticipated discharge date:  1/5/2020  Destination: []??????home alone   []??????home alone with assist PRN     [x]?????? home w/ friend Maryjane      [x]?????? Continuous supervision  []??????SNF    []?????? Assisted living     []?????? Other:  Continued therapy: [x]??????HHC PT  []??????OUTPATIENT  PT   []?????? No Further PT  []??????SNF PT  Caregiver training recommended: []???? ? ? Yes  []?????? No   Equipment needs:RW and manual WC  Liam Alva the assistance of a wheeled walker to successfully ambulate from room to room at home to allow completion of daily living tasks such as: bathing, toileting, dressing and grooming.  A wheeled walker is necessary due to the patient's unsteady gait, upper body weakness, inability to  a standard walker.  This patient can ambulate only by pushing a walker instead of using a lesser assistive device such as a cane or crutch. Cristal Felixb a standard wheelchair to successfully complete daily living tasks such as: bathing, toileting, dressing and grooming; or any other daily living task in the home.  A standard manual wheelchair is necessary due to patient's impaired ambulation and mobility restrictions and would be unable to resolve these daily living tasks using a cane or walker.  The patient is capable of using/self-propelling a standard wheelchair safely in their home and can maneuver within their home with adequate access in a reasonable amount of time. The patient has not expressed an unwillingness to use the wheelchair. Having this wheelchair would enable Liam Chavezrob Zimmer Sr. ability to regularly participate in the above mentioned daily living tasks around the home. There is a caregiver available to provide necessary assistance.    Expected length of need is lifetime.    Alicia Osborn Sr. does not have any infectious diagnoses.   Alicia Osborn Sr. can self propel a wheelchair and needs anti-rollback device to propel up ramps. Bed Mobility:            [x]  Pt received out of bed   Rolling R/L:  Supervision using log roll technique and bed features  Scooting:  Supervision   Lying --> Sit:  Supervision using log roll technique and bed features. Transfers:    Bed --> Recliner: SBA  Assistive device required for transfer:  RW    Other: Education in importance of sitting up during meals tomorrow (January 1st) to help increase strength. Patient/Caregiver Education and Training:   [x]   Bed Mobility/Transfer technique/safety  []   Gait technique/sequencing  []   Proper use of assistive device  []   Advanced mobility safety and technique  [x]   Reinforced patient's precautions/mobility while maintaining precautions  []   Postural awareness  []   Family training  [x]   Progress was updated in Rehabtracker this date. Treatment Plan for Next Session: gait; transfers; exercises    Assessment:    Treatment/Activity Tolerance:   [x] Tolerated treatment with no adverse effects    [] Patient limited by fatigue  [] Patient limited by pain   [] Patient limited by medical complications:    [] Adverse reaction to Tx:   [] Significant change in status    Safety:       []  bed alarm set    [x]  chair alarm set    []  Pt refused alarms                []  Telesitter activated      [x]  Gait belt used during tx session      [x]other: pt left seated up in recliner with call light at end of treatment.         Number of Minutes/Billable Intervention  Gait Training    Therapeutic Exercise    Neuro Re-Ed    Therapeutic Activity 20   Wheelchair Propulsion    Group    Other:    TOTAL 20         Social History  Social/Functional History  Lives With: Significant other  Type of Home: House  Home Layout: One level  Home Access: Ramped entrance  Bathroom Shower/Tub: Tub/Shower unit, Shower chair without back(Pt does not use shower chair - pt bathes in tub.)  Bathroom Toilet: Standard  Bathroom Equipment: Grab bars in [facility]: Rolling walker, Cane(Pt uses rollator at baseline, also has access to cane.)  ADL Assistance: [address]: (Rollator)  Homemaking Responsibilities: No(Pt's girlfriend performs IADLs)  Ambulation Assistance: Independent(Mod I with use of rollator)  Transfer Assistance: Independent  Active : No  Patient's  Info: Pt's girlfriend drives  Education: Some college  Occupation: Retired  Leisure & Hobbies: Martial arts  IADL Comments: Pt manages own medications. Additional Comments: Pt sleeps on flat bed at baseline. Pt reports no falls in the past year. Objective                                                                                    Goals:  (Update in navigator)   : Long term goals  Time Frame for Long term goals : In 10 days:  Long term goal 1: Pt will complete all bed mobility independently  Long term goal 2: Pt will complete sit <> stand transfers with modAx1  Long term goal 3: Pt will ambulate 20 feet with modAx2 with LRAD  Long term goal 4: Pt will propel manual w/c 150 feet independently  Long term goal 5: Pt will independently complete 3 sets of 10 reps of BLE AROM exercises in available and allowed ROM:        Plan of Care                                                                              Times per week: 5 days per week for a minimum of 60 minutes/day plus group as appropriate for 60 minutes.   Treatment to include Current Treatment Recommendations: Strengthening, ROM, Balance Training, Transfer Training, ADL/Self-care Training, Functional Mobility Training, IADL Training, Neuromuscular Re-education, Safety Education & Training, Home Exercise Program, Endurance Training, Patient/Caregiver Education & Training, Equipment Evaluation, Education, & procurement, Gait Training, Pain Management, Positioning, Wheelchair Mobility Training, Stair training    Electronically signed by   Christine Saldana [id number]  12/31/2020, 1:07 PM

## 2020-12-31 NOTE — PROGRESS NOTES
Tomas Hogan .    : 1950  Acct #: [de-identified]  MRN: [id number]              PM&R Progress Note      Admitting diagnosis: Lumbar spinal stenosis with radiculopathy (IGC 3.9)     Comorbid diagnoses impacting rehabilitation: Uncontrolled pain, generalized weakness, gait disturbance, acute urinary retention, essential hypertension, uncontrolled diabetes type 2 with peripheral neuropathy, COPD, status post left total knee arthroplasty.     Chief complaint: Feeling better today. Willing to work towards getting home next week. Prior (baseline) level of function: Independent. Current level of function:         Current  IRF-MIREILLE and Goals:   Hearing, Speech, and Vision  Expression of Ideas and Wants: Without difficulty  Understanding Verbal and Non-Verbal Content: Understands  Prior Functioning: Everyday Activities  Self Care: Independent  Indoor Mobility (Ambulation): Independent  Stairs: Independent  Functional Cognition: Independent  Prior Device Use: Walker    Eating  Assistance Needed: Independent  Comment: Per pt report  CARE Score: 6  Oral Hygiene  Assistance Needed: Setup or clean-up assistance  Comment: Pt performed oral care w/c level with setup. CARE Score: 5  Discharge Goal: Independent  Toileting Hygiene  Assistance Needed: Dependent  Reason if not Attempted: Not attempted due to medical condition or safety concerns(Cordero catheter)  CARE Score: 1  Discharge Goal: Partial/moderate assistance  Shower/Bathe Self  Assistance Needed: Substantial/maximal assistance, Partial/moderate assistance  Comment: Pt performed sponge bathing w/c level with Mod A to wash anterior/posterior vikki area and distal BLE 2/2 spinal precautions. Discharge Goal: Partial/moderate assistance  Upper Body Dressing  Assistance Needed: Partial/moderate assistance  Comment: Pt doffed/donned shirt with SBA, however req Max A to don TLSO, requiring Mod A overall.   CARE Score: 3  Discharge Goal: Independent  Lower Body Dressing  Assistance Needed: Dependent  Comment: Don/doff pants Dep x2 with use of Leretha David for STS. Pt unable to thread LE 2/2 sternal precautions. CARE Score: 1  Discharge Goal: Partial/moderate assistance  Putting On/Taking Off Footwear  Assistance Needed: Dependent  Comment: Dep to don/doff socks, pt demo poor knee extension to assist with lifting LE. CARE Score: 1  Discharge Goal: Independent    Roll Left and Right  Assistance Needed: Partial/moderate assistance  CARE Score: 3  Discharge Goal: Independent  Sit to Lying  Assistance Needed: Partial/moderate assistance  CARE Score: 3  Discharge Goal: Independent  Sit to Stand  Assistance Needed: Dependent  Comment: stedy  CARE Score: 1  Discharge Goal: Partial/moderate assistance  Chair/Bed-to-Chair Transfer  Assistance Needed: Dependent  Comment: steady  CARE Score: 1  Discharge Goal: Partial/moderate assistance  Toilet Transfer  Assistance Needed: Dependent  Comment: Dep x2 toilet transfer with use of Leretha David. CARE Score: 1  Discharge Goal: Partial/moderate assistance  Car Transfer  Comment: not safe to attempt at this time due to patient dependent x2 with use of stedy for sit <> stand transfers  Reason if not Attempted: Not attempted due to medical condition or safety concerns  CARE Score: 88  Discharge Goal: Partial/moderate assistance   Walk 10 Feet?   Walk 10 Feet?: No  1 Step  Reason if not Attempted: Not attempted due to medical condition or safety concerns  Picking Up Object  Comment: not safe to attempt at this time due to patient dependent x2 with use of stedy for sit <> stand transfers  Reason if not Attempted: Not attempted due to medical condition or safety concerns  CARE Score: 88  Discharge Goal: Partial/moderate assistance  Wheelchair Ability  Uses a Wheelchair and/or Scooter?: Yes  Wheel 50 Feet with Two Turns  Assistance Needed: Supervision or touching assistance  Physical Assistance Level: No physical assistance  CARE Score: 4  Discharge Goal: Independent  Wheel 150 Feet  Assistance Needed: Substantial/maximal assistance  Physical Assistance Level: 50%-74%  Comment: patient fatigued with 50 feet of w/c propulsion and required assistance to propel manual w/c remaining distance  CARE Score: 2  Discharge Goal: Independent            I      Exam:    Blood pressure 127/74, pulse 91, temperature 96.9 °F (36.1 °C), resp. rate 18, height 6' (1.829 m), weight 219 lb (99.3 kg), SpO2 98 %. General: Up in a wheelchair. Talkative. LSO in place. HEENT: MMM. Neck supple. Pulmonary: No rales or rhonchi. Cardiac: Regular rate and rhythm. Abdomen: Patient's abdomen is soft and nondistended. Bowel sounds were present throughout. There was no rebound, guarding or masses noted. Upper extremities: Able to bring both hands up to meet mine. Strong . Lower extremities: Guarded movements of the left knee. Incomplete left ankle dorsiflexion but more spontaneous with leg movements. No signs of DVT. Sitting balance was good. Standing balance was poor. Lab Results   Component Value Date    WBC 8.5 05/26/2017    HGB 14.6 05/26/2017    HCT 46.5 05/26/2017    MCV 88.1 05/26/2017     05/26/2017     Lab Results   Component Value Date    INR 1.06 02/09/2011    PROTIME 11.6 02/09/2011     Lab Results   Component Value Date    CREATININE 0.9 10/19/2017    BUN 12 10/19/2017     10/19/2017    K 4.5 10/19/2017     10/19/2017    CO2 26 10/19/2017     Lab Results   Component Value Date    ALT 30 08/16/2018    AST 22 08/16/2018    ALKPHOS 110 08/16/2018    BILITOT 0.3 08/16/2018       Expected length of stay  prior to a supervised level of function for discharge home with a walker and Brandi Ingram OT/PT is 1/5/2021. Recommendations:    1. Lumbar spinal stenosis with radiculopathy and gait disturbance:   Has resumed more consistent participation in the daily occupational and physical therapy.    Screening labs are unremarkable.   Voiding regularly. Occasionally uses pain fatigue as reasons not to try activity.  Ongoing aggressive pain management, DVT prophylaxis and pulmonary hygiene.  Voiding trial progressing well. Miguel Angel Lehman is always wearing an LSO when out of bed.  Weightbearing as tolerated per his surgeon.  No signs of wound infection. Out of bed consistently today.     2. DVT prophylaxis: With recent spinal surgery he has relative contraindications to chemoprophylaxis.  SCDs when in bed.  Weightbearing activities are limited but are tried daily.    No current signs of thrombosis. 3. Acute urinary retention: Cordero catheter has been removed. He is voiding well. Continue nightly Flomax. 4. Unnd oral analgesics.  Tolerating the scheduled Tylenol 4 times daily and have oxycodone available as needed.  Flexeril as needed. Aggressive bowel intervention while on the narcotics. 5. Uncontrolled diabetes with hyperglycemia: CCD diet.  Blood sugars will be checked at before meals and at bedtime.  Oral Metformin and sliding scale Humalog.  Blood sugars generally less than 160.  6. Hypertension: Lopressor for blood pressure control.  Target systolic blood pressure is 120-140.  Vital signs are checked at rest and with activity.   7. COPD: Aggressive pulmonary hygiene.  Monitoring O2 saturations as symptoms dictate.  Albuterol inhaler has not been needed

## 2020-12-31 NOTE — CARE COORDINATION
Therapy requesting caregiver training. Case mgt spoke with Jake, sig other. Scheduled for Monday 1/4 @1000. Rosa Parry, therapy coord, notified.

## 2020-12-31 NOTE — PROGRESS NOTES
Physical Therapy      [x] daily progress note       [] discharge       Patient Name:  Harrison Monique   :  1950 MRN: [id number]  Room:  [address] [place], [place] [address] Date of Admission: 2020  Rehabilitation Diagnosis:   Spinal stenosis, lumbar region with neurogenic claudication [M48.062]  Spinal stenosis of lumbar region with radiculopathy [M48.061, M54.16]       Date 2020       Day of ARU Week:  7   Time IN/OUT 0840-x   Individual Tx Minutes 0   TOTAL Tx Time Mins 0   Variance Time    Variance Time [x]   Refusal due to: Not feeling well   []   Medical hold/reason:    []   Illness   []   Off Unit for test/procedure  []   Extra time needed to complete task  []   Therapeutic need  []   Other (specify):   Restrictions Restrictions/Precautions  Restrictions/Precautions: Weight Bearing, Fall Risk, General Precautions(B LE WBAT, back brace on when up)  Required Braces or Orthoses?: Yes  Position Activity Restriction  Spinal Precautions: No Bending, No Lifting, No Twisting  Spinal Precautions: Educated pt on precautions, pt verbalized understanding. Other position/activity restrictions: Wound vac on lumbar spine, newell catheter. Communication with other providers: [x]   OK to see per nursing:     []   Spoke with team member regarding:      Subjective observations and cognitive status: Pt resting in bed, adamantly refusing due to episode of vertigo and kept stating \"I'm not feeling it today, I cant do it. Max encouragement provided but pt still refusing. Pt refusing ex's in bed. Discharge recommendations  Anticipated discharge date:    Destination: []??????home alone   []??????home alone with assist PRN     [x]?????? home w/ friend Maryjane      [x]?????? Continuous supervision  []??????SNF    []?????? Assisted living     []?????? Other:  Continued therapy: [x]??????HHC PT  []??????OUTPATIENT  PT   []?????? No Further PT  []??????SNF PT  Caregiver training recommended: []???? ? ? Yes  []?????? No   Equipment needs: RW and manual WC  Jonathan Arguelles Sr. requires the assistance of a wheeled walker to successfully ambulate from room to room at home to allow completion of daily living tasks such as: bathing, toileting, dressing and grooming.  A wheeled walker is necessary due to the patient's unsteady gait, upper body weakness, inability to  a standard walker.  This patient can ambulate only by pushing a walker instead of using a lesser assistive device such as a cane or crutch. Aicha Ling a standard wheelchair to successfully complete daily living tasks such as: bathing, toileting, dressing and grooming; or any other daily living task in the home.  A standard manual wheelchair is necessary due to patient's impaired ambulation and mobility restrictions and would be unable to resolve these daily living tasks using a cane or walker.  The patient is capable of using/self-propelling a standard wheelchair safely in their home and can maneuver within their home with adequate access in a reasonable amount of time. The patient has not expressed an unwillingness to use the wheelchair. Having this wheelchair would enable Liam Escobar Sr. ability to regularly participate in the above mentioned daily living tasks around the home. There is a caregiver available to provide necessary assistance.    Expected length of need is lifetime.    Jonathan Arguelles Sr. does not have any infectious diagnoses. Jonathan Arguelles Sr. can self propel a wheelchair and needs anti-rollback device to propel up ramps.        Social History  Social/Functional History  Lives With: Significant other  Type of Home: House  Home Layout: One level  Home Access: Ramped entrance  Bathroom Shower/Tub: Tub/Shower unit, Shower chair without back(Pt does not use shower chair - pt bathes in tub.)  Bathroom Toilet: Standard  Bathroom Equipment: Grab bars in [address]: Rolling walker, Cane(Pt uses rollator at baseline, also has access to cane.)  ADL Assistance: Independent  Homemaking Assistance: (Rollator)  Homemaking Responsibilities: No(Pt's girlfriend performs IADLs)  Ambulation Assistance: Independent(Mod I with use of rollator)  Transfer Assistance: Independent  Active : No  Patient's  Info: Pt's girlfriend drives  Education: Some college  Occupation: Retired  Leisure & Hobbies: Martial arts  IADL Comments: Pt manages own medications. Additional Comments: Pt sleeps on flat bed at baseline. Pt reports no falls in the past year. Objective                                                                                    Goals:  (Update in navigator)   : Long term goals  Time Frame for Long term goals : In 10 days:  Long term goal 1: Pt will complete all bed mobility independently  Long term goal 2: Pt will complete sit <> stand transfers with modAx1  Long term goal 3: Pt will ambulate 20 feet with modAx2 with LRAD  Long term goal 4: Pt will propel manual w/c 150 feet independently  Long term goal 5: Pt will independently complete 3 sets of 10 reps of BLE AROM exercises in available and allowed ROM:        Plan of Care                                                                              Times per week: 5 days per week for a minimum of 60 minutes/day plus group as appropriate for 60 minutes.   Treatment to include Current Treatment Recommendations: Strengthening, ROM, Balance Training, Transfer Training, ADL/Self-care Training, Functional Mobility Training, IADL Training, Neuromuscular Re-education, Safety Education & Training, Home Exercise Program, Endurance Training, Patient/Caregiver Education & Training, Equipment Evaluation, Education, & procurement, Gait Training, Pain Management, Positioning, Wheelchair Mobility Training, Stair training    Electronically signed by   Robert Contreras, PTA #[id number]  12/31/2020, 8:34 AM

## 2020-12-31 NOTE — PROGRESS NOTES
Occupational Therapy  Physical Rehabilitation: OCCUPATIONAL THERAPY     [x] daily progress note       [] discharge       Patient Name:  Leah Euceda.   :  1950 MRN: [id number]  Room:  [address] [place] [address] Date of Admission: 2020  Rehabilitation Diagnosis:   Spinal stenosis, lumbar region with neurogenic claudication [M48.062]  Spinal stenosis of lumbar region with radiculopathy [M48.061, M54.16]       Date 2020       Day of ARU Week:  7   Time IN/OUT 1434/1534   Individual Tx Minutes 60   Group Tx Minutes    Co-Treat Minutes    Concurrent Tx Minutes    TOTAL Tx Time Mins 60   Variance Time    Variance Time []   Refusal due to:     []   Medical hold/reason:    []   Illness   []   Off Unit for test/procedure  []   Extra time needed to complete task  []   Therapeutic need  []   Other (specify):   Restrictions Restrictions/Precautions: Weight Bearing, Fall Risk, General Precautions(B LE WBAT, back brace on when up)         Communication with other providers: [x]   OK to see per nursing:     []   Spoke with team member regarding:      Subjective observations and cognitive status: Pt in bed on approach, agreeable to tx session this afternoon, but still reporting fatigue. /79 at EOB. Pt agreeable to sitting in recliner at end of session     Pain level/location:    2/10       Location: Lower back   Discharge recommendations  Anticipated discharge date:    Destination: []??home alone   []? ?home alone w assist prn   [x]? ? home w/ family    []? ? Continuous supervision       []? ?SNF    []? ? Assisted living     []? ? Other:   Continued therapy: [x]? ?Brandi Ingram OT  []??OUTPATIENT  OT   []?? No Further OT  Equipment needs: has BSC and grab bars.  Mehul shower chair with back      Toileting:   Denied need      Bed Mobility:           []   Pt received out of bed   Supine --> Sit:  Supervision, required cue for logroll as pt was initially non compliant      Transfers:    Sit--> Stand:  SBA  Stand --> Sit:  Ranged from CGA-min A, pt continues to require cues to bring BUEs back to chair  Stand-Pivot:   CGA  Other:    Assistive device required for transfer:   RW      Functional Mobility:    Assistance:  CGA ~85 ft  Device:   [x]   Rolling Walker     []   Standard Walker []   Wheelchair        []   [employer]       []   4-Wheeled Betsy Cinthya         []   Cardiac Walker       []   Other:          Additional Therapeutic activities/exercises completed this date:     []   ADL Training   [x]   Balance/Postural training: Pt stood x 2.5 min + 1 min + 2.5 mins + 3 mins at counter with SBA while completing dynamic stand task while reaching outside base of support to facilitate increased balance for ADL tasks and transfers. Required education on compensatory strategies to avoid twisting during task       [x]   Bed/Transfer Training   [x]   Endurance Training   []   Neuromuscular Re-ed   []   Nu-step:  Time:        Level:         #Steps:       []   Rebounder:    []  Seated     []  Standing        []   Supine Ther Ex (reps/sets):     [x]   Seated Ther Ex (reps/sets): To increase BUE endurance for ADLs and transfers pt completed arm ergometer on min resistance, rowing position 2* spinal precautions, x10 mins, average 20 RPM    To increase BUE endurance for ADLs and transfers, pt completed 2 sets x10 reps of the following therex simultaneously c BUEs, c a 4# weighted bar:   Shoulder presses  Chest presses  Concentric arm circles (clockwise)  Bicep curls      Comments: All intervention performed to increase pt's endurance, ax tolerance, balance,  and I c ADLs/IADLs and functional transfers/mobility.          Patient/Caregiver Education and Training:   []   [employer] Equipment Use  [x]   Bed Mobility/Transfer Technique/Safety  []   Energy Conservation Tips  []   Family training  [x]   Postural Awareness  [x]   Safety During Functional Activities  []   Reinforced Patient's Precautions   []   Progress was updated and reviewed in Rehabtracker with patient and/or family this         date. Treatment Plan for Next Session: Continue OT POC      Assessment:  Pt did seem more limited by fatigue today than usual but did participate. Treatment/Activity Tolerance:   [] Tolerated treatment with no adverse effects    [x] Patient limited by fatigue  [] Patient limited by pain   [] Patient limited by medical complications:    [] Adverse reaction to Tx:   [] Significant change in status    Safety:       []  bed alarm set    [x]  chair alarm set    []  Pt refused alarms                []  Telesitter activated      [x]  Gait belt used during tx session      []other:       Number of Minutes/Billable Intervention  Therapeutic Exercise 25   ADL Self-care    Neuro Re-Ed    Therapeutic Activity 35   Group    Other:    TOTAL 60       Social History  Social/Functional History  Lives With: Significant other  Type of Home: House  Home Layout: One level  Home Access: Ramped entrance  Bathroom Shower/Tub: Tub/Shower unit, Shower chair without back(Pt does not use shower chair - pt bathes in tub.)  Bathroom Toilet: Standard  Bathroom Equipment: Grab bars in shower  Home Equipment: Rolling walker, Cane(Pt uses rollator at baseline, also has access to cane.)  ADL Assistance: [address] [place] [address]: (Rollator)  Homemaking Responsibilities: No(Pt's girlfriend performs IADLs)  Ambulation Assistance: Independent(Mod I with use of rollator)  Transfer Assistance: Independent  Active : No  Patient's  Info: Pt's girlfriend drives  Education: Some college  Occupation: Retired  Leisure & Hobbies: Martial arts  IADL Comments: Pt manages own medications. Additional Comments: Pt sleeps on flat bed at baseline. Pt reports no falls in the past year.     Objective                                                                                    Goals:  (Update in navigator)  Short term goals  Time Frame for Short term goals: STG=LTG:  Long term goals  Time Frame for Long term goals : 10-14 days  Long term goal 1: Pt will perform all grooming tasks with Mod I.  Long term goal 2: Pt will perform sponge bathing with Min A and AE/DME. Long term goal 3: Pt will perform UB dressing with Mod I.  Long term goal 4: Pt will perform LB dressing with Min A and AE/DME. Long term goal 5: Pt will don/doff footwear with Mod I and AE. Long term goals 6: Pt will complete toileting with Min A and AE/DME. Long term goal 7: Pt will complete all functional transfers (toilet, tub, bed) with Min A and DME. Long term goal 8: Pt will participate in therex/theract with emphasis on static stance >3-5 min to increase standing tolerance and endurance needed for ADLs/IADLs. :        Plan of Care                                                                              Times per week: 5 days per week for a minimum of 60 minutes/day plus group as appropriate for 60 minutes. Treatment to include Plan  Times per week: 5x/week  Times per day: Daily  Plan weeks: 10-14 days  Specific instructions for Next Treatment: LB AE education  Current Treatment Recommendations: Strengthening, Wheelchair Mobility Training, Pain Management, Positioning, ROM, Gait Training, Safety Education & Training, Balance Training, Stair training, Patient/Caregiver Education & Training, Self-Care / ADL, Functional Mobility Training, Equipment Evaluation, Education, & procurement, Home Management Training, Endurance Training    Electronically signed by   David Rodriguez MS, OTR/L  License #[id number]  12/31/2020, 3:11 PM

## 2020-12-31 NOTE — PROGRESS NOTES
Occupational Therapy    Physical Rehabilitation: OCCUPATIONAL THERAPY     [x] daily progress note       [] discharge       Patient Name:  Josesito Ge   :  1950 MRN: [id number]  Room:  [address] [place], [place] [address] Date of Admission: 2020  Rehabilitation Diagnosis:   Spinal stenosis, lumbar region with neurogenic claudication [M48.062]  Spinal stenosis of lumbar region with radiculopathy [M48.061, M54.16]       Date 2020       Day of ARU Week:  7   Time IN/OUT N/A, Pt refused participation this date. Individual Tx Minutes    Group Tx Minutes    Co-Treat Minutes    Concurrent Tx Minutes    TOTAL Tx Time Mins 0   Variance Time -60    Variance Time [x]   Refusal due to:     []   Medical hold/reason:    []   Illness   []   Off Unit for test/procedure  []   Extra time needed to complete task  []   Therapeutic need  [x]   Other (specify): Pt adamantly refused participation in OT this date. Pt reports dizziness, and pain in L knee as cause. Pt repeatedly stated \"I just can't today\" no amount of encouragement or education in re to benefits of participation were successful. SHERLEY and supervising OTR were notified. Restrictions Restrictions/Precautions: Weight Bearing, Fall Risk, General Precautions(B LE WBAT, back brace on when up)         Communication with other providers: [x]   OK to see per nursing:     []   Spoke with team member regarding:      Subjective observations and cognitive status:      Pain level/location:    /10       Location: L knee, did not enumerate. Pt had ice pack on L knee.     Discharge recommendations  Anticipated discharge date:  2020  Destination: []home alone   []home alone w assist prn   [x] home w/ family friend     [x] Continuous supervision       []SNF    [] Assisted living     [] Other:   Continued therapy: [x]HHC OT  []OUTPATIENT  OT   [] No Further OT  Equipment needs: RW and manual WC  Liam Greenfield the assistance of a wheeled walker to successfully ambulate from room to room at home to allow completion of daily living tasks such as: bathing, toileting, dressing and grooming.  A wheeled walker is necessary due to the patient's unsteady gait, upper body weakness, inability to  a standard walker.  This patient can ambulate only by pushing a walker instead of using a lesser assistive device such as a cane or crutch. Noemi Leo a standard wheelchair to successfully complete daily living tasks such as: bathing, toileting, dressing and grooming; or any other daily living task in the home.  A standard manual wheelchair is necessary due to patient's impaired ambulation and mobility restrictions and would be unable to resolve these daily living tasks using a cane or walker.  The patient is capable of using/self-propelling a standard wheelchair safely in their home and can maneuver within their home with adequate access in a reasonable amount of time. The patient has not expressed an unwillingness to use the wheelchair. Having this wheelchair would enable Liam De La Cruz Sr. ability to regularly participate in the above mentioned daily living tasks around the home. There is a caregiver available to provide necessary assistance.    Expected length of need is lifetime.    Jorge Styles Sr. does not have any infectious diagnoses. Jorge Styles Sr. can self propel a wheelchair and needs anti-rollback device to propel up ramps.                Social History  Social/Functional History  Lives With: Significant other  Type of Home: House  Home Layout: One level  Home Access: Ramped entrance  Bathroom Shower/Tub: Tub/Shower unit, Shower chair without back(Pt does not use shower chair - pt bathes in tub.)  Bathroom Toilet: Standard  Bathroom Equipment: Grab bars in [address]: Rolling walker, Cane(Pt uses rollator at baseline, also has access to cane.)  ADL Assistance: [address] [place] [address] Avenue: [phone] Responsibilities: No(Pt's girlfriend performs IADLs)  Ambulation Assistance: Independent(Mod I with use of rollator)  Transfer Assistance: Independent  Active : No  Patient's  Info: Pt's girlfriend drives  Education: Some college  Occupation: Retired  Leisure & Hobbies: Martial arts  IADL Comments: Pt manages own medications. Additional Comments: Pt sleeps on flat bed at baseline. Pt reports no falls in the past year. Objective                                                                                    Goals:  (Update in navigator)  Short term goals  Time Frame for Short term goals: STG=LTG:  Long term goals  Time Frame for Long term goals : 10-14 days  Long term goal 1: Pt will perform all grooming tasks with Mod I.  Long term goal 2: Pt will perform sponge bathing with Min A and AE/DME. Long term goal 3: Pt will perform UB dressing with Mod I.  Long term goal 4: Pt will perform LB dressing with Min A and AE/DME. Long term goal 5: Pt will don/doff footwear with Mod I and AE. Long term goals 6: Pt will complete toileting with Min A and AE/DME. Long term goal 7: Pt will complete all functional transfers (toilet, tub, bed) with Min A and DME. Long term goal 8: Pt will participate in therex/theract with emphasis on static stance >3-5 min to increase standing tolerance and endurance needed for ADLs/IADLs. :        Plan of Care                                                                              Times per week: 5 days per week for a minimum of 60 minutes/day plus group as appropriate for 60 minutes.   Treatment to include Plan  Times per week: 5x/week  Times per day: Daily  Plan weeks: 10-14 days  Specific instructions for Next Treatment: LB AE education  Current Treatment Recommendations: Strengthening, Wheelchair Mobility Training, Pain Management, Positioning, ROM, Gait Training, Safety Education & Training, Balance Training, Stair training, Patient/Caregiver Education & Training, Self-Care / ADL, Functional Mobility Training, Equipment Evaluation, Education, & procurement, Home Management Training, Endurance Training    Electronically signed by   Myra Muller, [address]  12/31/2020, 12:20 PM

## 2021-01-01 LAB
GLUCOSE BLD-MCNC: 104 MG/DL (ref 70–99)
GLUCOSE BLD-MCNC: 106 MG/DL (ref 70–99)
GLUCOSE BLD-MCNC: 107 MG/DL (ref 70–99)

## 2021-01-01 PROCEDURE — 82962 GLUCOSE BLOOD TEST: CPT

## 2021-01-01 PROCEDURE — 6370000000 HC RX 637 (ALT 250 FOR IP): Performed by: PHYSICAL MEDICINE & REHABILITATION

## 2021-01-01 PROCEDURE — 1280000000 HC REHAB R&B

## 2021-01-01 PROCEDURE — 94761 N-INVAS EAR/PLS OXIMETRY MLT: CPT

## 2021-01-01 PROCEDURE — 6370000000 HC RX 637 (ALT 250 FOR IP): Performed by: NURSE PRACTITIONER

## 2021-01-01 RX ADMIN — OXYCODONE HYDROCHLORIDE 10 MG: 5 TABLET ORAL at 09:53

## 2021-01-01 RX ADMIN — ACETAMINOPHEN 1000 MG: 500 TABLET ORAL at 18:31

## 2021-01-01 RX ADMIN — OXYCODONE HYDROCHLORIDE 10 MG: 5 TABLET ORAL at 05:12

## 2021-01-01 RX ADMIN — ACETAMINOPHEN 1000 MG: 500 TABLET ORAL at 13:54

## 2021-01-01 RX ADMIN — DOCUSATE SODIUM 100 MG: 100 CAPSULE ORAL at 09:54

## 2021-01-01 RX ADMIN — STANDARDIZED SENNA CONCENTRATE 8.6 MG: 8.6 TABLET ORAL at 21:48

## 2021-01-01 RX ADMIN — METFORMIN HYDROCHLORIDE 500 MG: 500 TABLET ORAL at 18:30

## 2021-01-01 RX ADMIN — CYCLOBENZAPRINE 10 MG: 10 TABLET, FILM COATED ORAL at 03:18

## 2021-01-01 RX ADMIN — OXYCODONE HYDROCHLORIDE 10 MG: 5 TABLET ORAL at 01:11

## 2021-01-01 RX ADMIN — PRAVASTATIN SODIUM 40 MG: 40 TABLET ORAL at 21:48

## 2021-01-01 RX ADMIN — METOPROLOL TARTRATE 50 MG: 50 TABLET, FILM COATED ORAL at 21:48

## 2021-01-01 RX ADMIN — METOPROLOL TARTRATE 50 MG: 50 TABLET, FILM COATED ORAL at 09:54

## 2021-01-01 RX ADMIN — ACETAMINOPHEN 1000 MG: 500 TABLET ORAL at 21:48

## 2021-01-01 RX ADMIN — ACETAMINOPHEN 1000 MG: 500 TABLET ORAL at 09:53

## 2021-01-01 RX ADMIN — ASPIRIN 81 MG CHEWABLE TABLET 81 MG: 81 TABLET CHEWABLE at 09:54

## 2021-01-01 RX ADMIN — POLYETHYLENE GLYCOL (3350) 17 G: 17 POWDER, FOR SOLUTION ORAL at 09:54

## 2021-01-01 RX ADMIN — OXYCODONE HYDROCHLORIDE 10 MG: 5 TABLET ORAL at 18:31

## 2021-01-01 ASSESSMENT — PAIN SCALES - GENERAL
PAINLEVEL_OUTOF10: 5

## 2021-01-01 ASSESSMENT — PAIN DESCRIPTION - PROGRESSION: CLINICAL_PROGRESSION: GRADUALLY IMPROVING

## 2021-01-01 ASSESSMENT — PAIN DESCRIPTION - ORIENTATION: ORIENTATION: MID;LOWER

## 2021-01-01 ASSESSMENT — PAIN DESCRIPTION - LOCATION: LOCATION: BACK

## 2021-01-01 ASSESSMENT — PAIN DESCRIPTION - PAIN TYPE: TYPE: SURGICAL PAIN

## 2021-01-02 LAB
GLUCOSE BLD-MCNC: 109 MG/DL (ref 70–99)
GLUCOSE BLD-MCNC: 94 MG/DL (ref 70–99)

## 2021-01-02 PROCEDURE — 97542 WHEELCHAIR MNGMENT TRAINING: CPT

## 2021-01-02 PROCEDURE — 97112 NEUROMUSCULAR REEDUCATION: CPT

## 2021-01-02 PROCEDURE — 99232 SBSQ HOSP IP/OBS MODERATE 35: CPT | Performed by: PHYSICAL MEDICINE & REHABILITATION

## 2021-01-02 PROCEDURE — 97530 THERAPEUTIC ACTIVITIES: CPT

## 2021-01-02 PROCEDURE — 97110 THERAPEUTIC EXERCISES: CPT

## 2021-01-02 PROCEDURE — 1280000000 HC REHAB R&B

## 2021-01-02 PROCEDURE — 94761 N-INVAS EAR/PLS OXIMETRY MLT: CPT

## 2021-01-02 PROCEDURE — 6370000000 HC RX 637 (ALT 250 FOR IP): Performed by: PHYSICAL MEDICINE & REHABILITATION

## 2021-01-02 PROCEDURE — 82962 GLUCOSE BLOOD TEST: CPT

## 2021-01-02 PROCEDURE — 97116 GAIT TRAINING THERAPY: CPT

## 2021-01-02 PROCEDURE — 94150 VITAL CAPACITY TEST: CPT

## 2021-01-02 PROCEDURE — 97535 SELF CARE MNGMENT TRAINING: CPT

## 2021-01-02 RX ADMIN — METOPROLOL TARTRATE 50 MG: 50 TABLET, FILM COATED ORAL at 21:21

## 2021-01-02 RX ADMIN — METOPROLOL TARTRATE 50 MG: 50 TABLET, FILM COATED ORAL at 10:03

## 2021-01-02 RX ADMIN — STANDARDIZED SENNA CONCENTRATE 8.6 MG: 8.6 TABLET ORAL at 21:21

## 2021-01-02 RX ADMIN — OXYCODONE HYDROCHLORIDE 10 MG: 5 TABLET ORAL at 21:21

## 2021-01-02 RX ADMIN — ACETAMINOPHEN 1000 MG: 500 TABLET ORAL at 12:48

## 2021-01-02 RX ADMIN — ACETAMINOPHEN 1000 MG: 500 TABLET ORAL at 17:31

## 2021-01-02 RX ADMIN — PRAVASTATIN SODIUM 40 MG: 40 TABLET ORAL at 21:21

## 2021-01-02 RX ADMIN — METFORMIN HYDROCHLORIDE 500 MG: 500 TABLET ORAL at 17:31

## 2021-01-02 RX ADMIN — ASPIRIN 81 MG CHEWABLE TABLET 81 MG: 81 TABLET CHEWABLE at 10:04

## 2021-01-02 RX ADMIN — ACETAMINOPHEN 1000 MG: 500 TABLET ORAL at 09:05

## 2021-01-02 ASSESSMENT — PAIN DESCRIPTION - PROGRESSION: CLINICAL_PROGRESSION: GRADUALLY IMPROVING

## 2021-01-02 ASSESSMENT — PAIN SCALES - GENERAL
PAINLEVEL_OUTOF10: 5
PAINLEVEL_OUTOF10: 0
PAINLEVEL_OUTOF10: 7

## 2021-01-02 ASSESSMENT — PAIN DESCRIPTION - PAIN TYPE: TYPE: ACUTE PAIN;SURGICAL PAIN

## 2021-01-02 ASSESSMENT — PAIN - FUNCTIONAL ASSESSMENT: PAIN_FUNCTIONAL_ASSESSMENT: PREVENTS OR INTERFERES SOME ACTIVE ACTIVITIES AND ADLS

## 2021-01-02 ASSESSMENT — PAIN DESCRIPTION - DESCRIPTORS: DESCRIPTORS: ACHING;DULL

## 2021-01-02 ASSESSMENT — PAIN DESCRIPTION - FREQUENCY: FREQUENCY: CONTINUOUS

## 2021-01-02 ASSESSMENT — PAIN DESCRIPTION - ONSET: ONSET: ON-GOING

## 2021-01-02 NOTE — PLAN OF CARE
Problem: Pain:  Goal: Pain level will decrease  Description: Pain level will decrease  1/2/2021 1548 by Lorena Simmonds, RN  Outcome: Ongoing  1/2/2021 0508 by Anibal Wang RN  Outcome: Ongoing  Goal: Control of acute pain  Description: Control of acute pain  1/2/2021 1548 by Lorena Simmonds, RN  Outcome: Ongoing  1/2/2021 0508 by Anibal Wang RN  Outcome: Ongoing  Goal: Control of chronic pain  Description: Control of chronic pain  1/2/2021 1548 by Lorena Simmonds, RN  Outcome: Ongoing  1/2/2021 0508 by Anibal Wang RN  Outcome: Ongoing     Problem: Skin Integrity:  Goal: Will show no infection signs and symptoms  Description: Will show no infection signs and symptoms  1/2/2021 1548 by Lorena Simmonds, RN  Outcome: Ongoing  1/2/2021 0508 by Anibal Wang RN  Outcome: Ongoing  Goal: Absence of new skin breakdown  Description: Absence of new skin breakdown  1/2/2021 1548 by Lorena Simmonds, RN  Outcome: Ongoing  1/2/2021 0508 by Anibal Wang RN  Outcome: Ongoing

## 2021-01-02 NOTE — PROGRESS NOTES
need       Toilet Transfers:     Device Used:    []   Standard Toilet         []   Grab Bars           []  Bedside Commode       []   Elevated Toilet          []   Other:        Bed Mobility:           [x]   Pt received out of bed   Rolling R/L:  S  Scooting:  S  Supine --> Sit:  S  Sit --> Supine:  S    Transfers:    Sit--> Stand:  CGA  Stand --> Sit:   CGA  Stand-Pivot:   CGA  Other:    Assistive device required for transfer:   RW      Functional Mobility:    Assistance:  CGA within room and ~100 ft in hallway with 1 rest break; self-propelled w/c ~100 ft in hallway  Device:   [x]   Rolling Walker     []   Standard Walker [x]   Wheelchair        []   [employer]       []   Caye Gamal         []   Cardiac Kileyra Juma       []   Other:        Homemaking Tasks: Additional Therapeutic activities/exercises completed this date:     []   ADL Training   []   Balance/Postural training     [x]   Bed/Transfer Training   []   Endurance Training   []   Neuromuscular Re-ed   []   Nu-step:  Time:        Level:         #Steps:       []   Rebounder:    []  Seated     []  Standing        []   Supine Ther Ex (reps/sets):     [x]   Seated Ther Ex (reps/sets): UB ergometer 5 min on light resist     [x]   Standing Ther Ex (reps/sets):  Stood ~1 min then 1 min 20 sec to complete tabletop ax; stood ~30 sec 3x to complete light dynamic ax of ladderball   []   Other:      Comments:      Patient/Caregiver Education and Training:   []   [employer] Equipment Use  []   Bed Mobility/Transfer Technique/Safety  []   Energy Conservation Tips  []   Family training  []   Postural Awareness  []   Safety During Functional Activities  []   Reinforced Patient's Precautions   []   Progress was updated and reviewed in Rehabtracker with patient and/or family this         date.      Treatment Plan for Next Session: follow OT POC    Assessment: pt performed        Treatment/Activity Tolerance:   [x] Tolerated treatment with no adverse effects    [] Patient limited by fatigue  [] Patient limited by pain   [] Patient limited by medical complications:    [] Adverse reaction to Tx:   [] Significant change in status    Safety:       [x]  bed alarm set    []  chair alarm set    []  Pt refused alarms                []  Telesitter activated      [x]  Gait belt used during tx session      []other:       Number of Minutes/Billable Intervention  Therapeutic Exercise 30   ADL Self-care 15   Neuro Re-Ed    Therapeutic Activity 15   Group    Other:    TOTAL 60       Social History  Social/Functional History  Lives With: Significant other  Type of Home: House  Home Layout: One level  Home Access: Ramped entrance  Bathroom Shower/Tub: Tub/Shower unit, Shower chair without back(Pt does not use shower chair - pt bathes in tub.)  Bathroom Toilet: Standard  Bathroom Equipment: Grab bars in shower  Home Equipment: Rolling walker, Cane(Pt uses rollator at baseline, also has access to cane.)  ADL Assistance: [address] [place] [address] Avenue: (Rollator)  Homemaking Responsibilities: No(Pt's girlfriend performs IADLs)  Ambulation Assistance: Independent(Mod I with use of rollator)  Transfer Assistance: Independent  Active : No  Patient's  Info: Pt's girlfriend drives  Education: Some college  Occupation: Retired  Leisure & Hobbies: Martial arts  IADL Comments: Pt manages own medications. Additional Comments: Pt sleeps on flat bed at baseline. Pt reports no falls in the past year. Objective                                                                                    Goals:  (Update in navigator)  Short term goals  Time Frame for Short term goals: STG=LTG:  Long term goals  Time Frame for Long term goals : 10-14 days  Long term goal 1: Pt will perform all grooming tasks with Mod I.  Long term goal 2: Pt will perform sponge bathing with Min A and AE/DME.   Long term goal 3: Pt will perform UB dressing with Mod I.  Long term goal 4: Pt will perform LB dressing with Min A

## 2021-01-02 NOTE — PROGRESS NOTES
Physical Therapy  [x] daily progress note       [] discharge       Patient Name:  Gelacio Godinez   :  1950 MRN: [id number]  Room:  [address] [place] [address] Date of Admission: 2020  Rehabilitation Diagnosis:   Spinal stenosis, lumbar region with neurogenic claudication [M48.062]  Spinal stenosis of lumbar region with radiculopathy [M48.061, M54.16]       Date 2021       Day of ARU Week:  2   Time IN/-930  4042-7905   Individual Tx Minutes 60 + 60   Group Tx Minutes    Co-Treat Minutes    Concurrent Tx Minutes    TOTAL Tx Time Mins 120   Variance Time    Variance Time []   Refusal due to:     []   Medical hold/reason:    []   Illness   []   Off Unit for test/procedure  []   Extra time needed to complete task  []   Therapeutic need  []   Other (specify):   Restrictions Restrictions/Precautions  Restrictions/Precautions: Weight Bearing, Fall Risk, General Precautions(B LE WBAT, back brace on when up)  Required Braces or Orthoses?: Yes  Position Activity Restriction  Spinal Precautions: No Bending, No Lifting, No Twisting  Spinal Precautions: Educated pt on precautions, pt verbalized understanding. Other position/activity restrictions: Wound vac on lumbar spine, newell catheter. Communication with other providers: [x]   OK to see per nursing:     []   Spoke with team member regarding:      Subjective observations and cognitive status: Pt agreeable to tx. Alert and oriented x 3.  Nurse present upon arrival with patient I restroom  2nd tx: Pt c/o dizziness during supine ex and states \" I think its a reaction I'm getting from my medication\"    Pain level/location:    5/10       Location: LBP   Discharge recommendations  Anticipated discharge date:  2020  Destination: []???????home alone   []???????home alone with assist PRN     [x]??????? home w/ friend Maryjane      [x]??????? Continuous supervision  []???????SNF    []??????? Assisted living     []??????? Other:  Continued therapy: [x]???????[facility] PT  []???????OUTPATIENT  PT   []??????? No Further PT  []???????SNF PT  Caregiver training recommended: []????? ? ? Yes  []??????? No   Equipment needs:RW and manual WC  Liam Demarco Purl the assistance of a wheeled walker to successfully ambulate from room to room at home to allow completion of daily living tasks such as: bathing, toileting, dressing and grooming.  A wheeled walker is necessary due to the patient's unsteady gait, upper body weakness, inability to  a standard walker.  This patient can ambulate only by pushing a walker instead of using a lesser assistive device such as a cane or crutch. Massimo Brenna a standard wheelchair to successfully complete daily living tasks such as: bathing, toileting, dressing and grooming; or any other daily living task in the home.  A standard manual wheelchair is necessary due to patient's impaired ambulation and mobility restrictions and would be unable to resolve these daily living tasks using a cane or walker.  The patient is capable of using/self-propelling a standard wheelchair safely in their home and can maneuver within their home with adequate access in a reasonable amount of time. The patient has not expressed an unwillingness to use the wheelchair. Having this wheelchair would enable Liam Ferrell Sr. ability to regularly participate in the above mentioned daily living tasks around the home. There is a caregiver available to provide necessary assistance.    Expected length of need is lifetime.    Carrillo Gordillo Sr. does not have any infectious diagnoses. Carrillo Gordillo Sr. can self propel a wheelchair and needs anti-rollback device to propel up ramps.      Bed Mobility:           []   Pt received out of bed   Rolling R/L:  Supervision  Scooting:  Supervision  Lying --> Sit:  Supervision  Sit --> lying:  Supervision    Transfers:    Sit--> Stand:  Supervision  Stand --> Sit:  Supervision  Chair-->Bed/Bed --> Chair: Supervision  Car Transfers: SBA with cues not to hold on to door when sitting and 25% cues for hand placement when getting out of the car  Toilet Transfer (if applicable): Supervision  Other:    Assistive device required for transfer:   FWW    Gait:    Distance:  135ft, 115 ft , 25 ft and 95 ft  Assistance:  SBA  Device:  FWW  Gait Quality:  Pt demo increase weight through BUE with a step through pattern with 25% cues to increase step length and height throughout gait cycle with WBAT and lumbar brace    Wheelchair Propulsion:  Distance: 150 ft, 215 ft  Assistance:  Supervision  Extremities Used:   BUE/BLE  Type:    [x]  Manual        []  Electric             Additional Therapeutic activities/exercises completed this date:     []   Nu-step:  Time:        Level:         #Steps:       []   Rebounder:    []  Seated     []  Standing        []   Balance training         []   Postural training    [x]   Supine ther ex (reps/sets):  AROM FOR BLE of hip abd, SLR , heel slides, ankle pumps 3 x 10 reps with rest breaks in between each set   []   Seated ther ex (reps/sets):     []   Standing ther ex (reps/sets):     []   Picking up object from floor (standing):                   []   Reacher used   [x]   Other:Patient can recite 3/3 precautions   [x]   Other:Patient donned and doffed brace with Supervision  Comments:      Patient/Caregiver Education and Training:   [x]   Bed Mobility/Transfer technique/safety  [x]   Gait technique/sequencing  [x]   Proper use of assistive device  [x]   Advanced mobility safety and technique  []   Reinforced patient's precautions/mobility while maintaining precautions  []   Postural awareness  []   Family training  []   Progress was updated and reviewed in Rehabtracker with patient and/or family this date.     Treatment Plan for Next Session: Continue with strength training and gait      Assessment:      Treatment/Activity Tolerance:   [x] Tolerated treatment with no adverse effects    [] Patient limited by fatigue  [] Patient limited by pain   [] Patient limited by medical complications:    [] Adverse reaction to Tx:   [] Significant change in status    Safety:       []  bed alarm set    []  chair alarm set    [x]  Pt refused alarms                []  Telesitter activated      [x]  Gait belt used during tx session      []other:         Number of Minutes/Billable Intervention  Gait Training 30   Therapeutic Exercise 30   Neuro Re-Ed 15   Therapeutic Activity 30   Wheelchair Propulsion 15   Group    Other:    TOTAL 120         Social History  Social/Functional History  Lives With: Significant other  Type of Home: House  Home Layout: One level  Home Access: Ramped entrance  Bathroom Shower/Tub: Tub/Shower unit, Shower chair without back(Pt does not use shower chair - pt bathes in tub.)  Bathroom Toilet: Standard  Bathroom Equipment: Grab bars in shower  Home Equipment: Rolling walker, Cane(Pt uses rollator at baseline, also has access to cane.)  ADL Assistance: [facility]: (Rollator)  Homemaking Responsibilities: No(Pt's girlfriend performs IADLs)  Ambulation Assistance: Independent(Mod I with use of rollator)  Transfer Assistance: Independent  Active : No  Patient's  Info: Pt's girlfriend drives  Education: Some college  Occupation: Retired  Leisure & Hobbies: Martial arts  IADL Comments: Pt manages own medications. Additional Comments: Pt sleeps on flat bed at baseline. Pt reports no falls in the past year. Objective                                                                                    Goals:  (Update in navigator)   : Long term goals  Time Frame for Long term goals :  In 10 days:  Long term goal 1: Pt will complete all bed mobility independently  Long term goal 2: Pt will complete sit <> stand transfers with modAx1  Long term goal 3: Pt will ambulate 20 feet with modAx2 with LRAD  Long term goal 4: Pt will propel manual w/c 150 feet independently  Long term goal 5: Pt will independently complete 3 sets of 10 reps of BLE AROM exercises in available and allowed ROM:        Plan of Care                                                                              Times per week: 5 days per week for a minimum of 60 minutes/day plus group as appropriate for 60 minutes.   Treatment to include Current Treatment Recommendations: Strengthening, ROM, Balance Training, Transfer Training, ADL/Self-care Training, Functional Mobility Training, IADL Training, Neuromuscular Re-education, Safety Education & Training, Home Exercise Program, Endurance Training, Patient/Caregiver Education & Training, Equipment Evaluation, Education, & procurement, Gait Training, Pain Management, Positioning, Wheelchair Mobility Training, Stair training    Electronically signed by   Wilmer Spence, PTA [id number]  1/2/2021, 8:25 AM

## 2021-01-03 LAB
GLUCOSE BLD-MCNC: 101 MG/DL (ref 70–99)
GLUCOSE BLD-MCNC: 130 MG/DL (ref 70–99)

## 2021-01-03 PROCEDURE — 6370000000 HC RX 637 (ALT 250 FOR IP): Performed by: PHYSICAL MEDICINE & REHABILITATION

## 2021-01-03 PROCEDURE — 94761 N-INVAS EAR/PLS OXIMETRY MLT: CPT

## 2021-01-03 PROCEDURE — 6370000000 HC RX 637 (ALT 250 FOR IP): Performed by: NURSE PRACTITIONER

## 2021-01-03 PROCEDURE — 82962 GLUCOSE BLOOD TEST: CPT

## 2021-01-03 PROCEDURE — 1280000000 HC REHAB R&B

## 2021-01-03 RX ADMIN — METOPROLOL TARTRATE 50 MG: 50 TABLET, FILM COATED ORAL at 22:57

## 2021-01-03 RX ADMIN — OXYCODONE HYDROCHLORIDE 5 MG: 5 TABLET ORAL at 22:58

## 2021-01-03 RX ADMIN — ASPIRIN 81 MG CHEWABLE TABLET 81 MG: 81 TABLET CHEWABLE at 09:10

## 2021-01-03 RX ADMIN — PRAVASTATIN SODIUM 40 MG: 40 TABLET ORAL at 22:57

## 2021-01-03 RX ADMIN — METOPROLOL TARTRATE 50 MG: 50 TABLET, FILM COATED ORAL at 09:10

## 2021-01-03 RX ADMIN — ACETAMINOPHEN 1000 MG: 500 TABLET ORAL at 13:06

## 2021-01-03 RX ADMIN — DOCUSATE SODIUM 100 MG: 100 CAPSULE ORAL at 09:10

## 2021-01-03 RX ADMIN — OXYCODONE HYDROCHLORIDE 10 MG: 5 TABLET ORAL at 01:27

## 2021-01-03 RX ADMIN — STANDARDIZED SENNA CONCENTRATE 8.6 MG: 8.6 TABLET ORAL at 22:57

## 2021-01-03 RX ADMIN — POLYETHYLENE GLYCOL (3350) 17 G: 17 POWDER, FOR SOLUTION ORAL at 09:11

## 2021-01-03 RX ADMIN — METFORMIN HYDROCHLORIDE 500 MG: 500 TABLET ORAL at 17:52

## 2021-01-03 RX ADMIN — ACETAMINOPHEN 1000 MG: 500 TABLET ORAL at 17:52

## 2021-01-03 RX ADMIN — ACETAMINOPHEN 1000 MG: 500 TABLET ORAL at 22:58

## 2021-01-03 RX ADMIN — ACETAMINOPHEN 1000 MG: 500 TABLET ORAL at 08:12

## 2021-01-03 ASSESSMENT — PAIN DESCRIPTION - DESCRIPTORS
DESCRIPTORS: ACHING;DULL
DESCRIPTORS: ACHING;DULL

## 2021-01-03 ASSESSMENT — PAIN SCALES - GENERAL
PAINLEVEL_OUTOF10: 5
PAINLEVEL_OUTOF10: 7
PAINLEVEL_OUTOF10: 0
PAINLEVEL_OUTOF10: 4

## 2021-01-03 ASSESSMENT — PAIN DESCRIPTION - PAIN TYPE
TYPE: ACUTE PAIN;OTHER (COMMENT)
TYPE: ACUTE PAIN;SURGICAL PAIN
TYPE: ACUTE PAIN;SURGICAL PAIN

## 2021-01-03 ASSESSMENT — PAIN DESCRIPTION - ORIENTATION
ORIENTATION: MID;LOWER

## 2021-01-03 ASSESSMENT — PAIN DESCRIPTION - PROGRESSION: CLINICAL_PROGRESSION: GRADUALLY IMPROVING

## 2021-01-03 ASSESSMENT — PAIN - FUNCTIONAL ASSESSMENT: PAIN_FUNCTIONAL_ASSESSMENT: PREVENTS OR INTERFERES SOME ACTIVE ACTIVITIES AND ADLS

## 2021-01-03 ASSESSMENT — PAIN DESCRIPTION - ONSET
ONSET: ON-GOING
ONSET: ON-GOING

## 2021-01-03 ASSESSMENT — PAIN DESCRIPTION - LOCATION: LOCATION: BACK

## 2021-01-03 NOTE — PROGRESS NOTES
Natacha Wright .    : 1950  Acct #: [de-identified]  MRN: [id number]              PM&R Progress Note      Admitting diagnosis: Lumbar spinal stenosis with radiculopathy (IGC 3.9)     Comorbid diagnoses impacting rehabilitation: Uncontrolled pain, generalized weakness, gait disturbance, acute urinary retention, essential hypertension, uncontrolled diabetes type 2 with peripheral neuropathy, COPD, status post left total knee arthroplasty.     Chief complaint: Sleeping better. Pain persist but overall it is little better. No chills or cough. Prior (baseline) level of function: Independent. Current level of function:         Current  IRF-MIREILLE and Goals:   Hearing, Speech, and Vision  Expression of Ideas and Wants: Without difficulty  Understanding Verbal and Non-Verbal Content: Understands  Prior Functioning: Everyday Activities  Self Care: Independent  Indoor Mobility (Ambulation): Independent  Stairs: Independent  Functional Cognition: Independent  Prior Device Use: Walker    Eating  Assistance Needed: Independent  Comment: Per pt report  CARE Score: 6  Oral Hygiene  Assistance Needed: Setup or clean-up assistance  Comment: Pt performed oral care w/c level with setup. CARE Score: 5  Discharge Goal: Independent  Toileting Hygiene  Assistance Needed: Supervision or touching assistance  Reason if not Attempted: Not attempted due to medical condition or safety concerns(Cordero catheter)  CARE Score: 4  Discharge Goal: Partial/moderate assistance  Shower/Bathe Self  Assistance Needed: Substantial/maximal assistance, Partial/moderate assistance  Comment: Pt performed sponge bathing w/c level with Mod A to wash anterior/posterior vikki area and distal BLE 2/2 spinal precautions. Discharge Goal: Partial/moderate assistance  Upper Body Dressing  Assistance Needed: Partial/moderate assistance  Comment: Pt doffed/donned shirt with SBA, however req Max A to don TLSO, requiring Mod A overall.   CARE Score: working with him on pain management, DVT prophylaxis and pulmonary hygiene.    Voiding well after removal of his Cordero (on Flomax).     Using an LSO when out of bed.  Weightbearing as tolerated per his surgeon.  No signs of wound infection.  Stand pivot transfers with a walker today. No complications with removing his staples. Anticipating getting home with the weekend. 2. DVT prophylaxis: With recent spinal surgery he has relative contraindications to chemoprophylaxis.     Using SCDs when in bed.  Weightbearing activities are increasing daily.  No current signs of thrombosis. 3. Acute urinary retention: Cordero catheter was removed and his voiding trial has been successful.  Continue Flomax. No dysuria or incontinence. 4. Uncontrolled pain: Cryotherapy, LSO and oral analgesics.  Benefiting from the scheduled Tylenol 4 times daily and have oxycodone available as needed.  Flexeril as needed. Aggressive bowel intervention while on the narcotics. 5. Uncontrolled diabetes with hyperglycemia: CCD diet.  Blood sugars are checked at before meals and at bedtime.  Oral Metformin and sliding scale Humalog.  Recent blood sugars are consistently less than 150. Blood sugars twice daily. 6. Hypertension: Lopressor for blood pressure control.  Target systolic blood pressure is 120-140.  Vital signs are checked at rest and with activity.  Blood pressure in target range recently. 7.  COPD: Aggressive pulmonary hygiene.  Monitoring O2 saturations as symptoms dictate.  Albuterol inhaler as needed.

## 2021-01-04 LAB
GLUCOSE BLD-MCNC: 112 MG/DL (ref 70–99)
GLUCOSE BLD-MCNC: 91 MG/DL (ref 70–99)

## 2021-01-04 PROCEDURE — 97535 SELF CARE MNGMENT TRAINING: CPT

## 2021-01-04 PROCEDURE — 97110 THERAPEUTIC EXERCISES: CPT

## 2021-01-04 PROCEDURE — 1280000000 HC REHAB R&B

## 2021-01-04 PROCEDURE — 6370000000 HC RX 637 (ALT 250 FOR IP): Performed by: PHYSICAL MEDICINE & REHABILITATION

## 2021-01-04 PROCEDURE — 97116 GAIT TRAINING THERAPY: CPT

## 2021-01-04 PROCEDURE — 97530 THERAPEUTIC ACTIVITIES: CPT

## 2021-01-04 PROCEDURE — 82962 GLUCOSE BLOOD TEST: CPT

## 2021-01-04 PROCEDURE — 99232 SBSQ HOSP IP/OBS MODERATE 35: CPT | Performed by: PHYSICAL MEDICINE & REHABILITATION

## 2021-01-04 PROCEDURE — 94761 N-INVAS EAR/PLS OXIMETRY MLT: CPT

## 2021-01-04 RX ADMIN — ACETAMINOPHEN 1000 MG: 500 TABLET ORAL at 14:02

## 2021-01-04 RX ADMIN — METFORMIN HYDROCHLORIDE 500 MG: 500 TABLET ORAL at 17:19

## 2021-01-04 RX ADMIN — ACETAMINOPHEN 1000 MG: 500 TABLET ORAL at 22:09

## 2021-01-04 RX ADMIN — OXYCODONE HYDROCHLORIDE 5 MG: 5 TABLET ORAL at 22:09

## 2021-01-04 RX ADMIN — STANDARDIZED SENNA CONCENTRATE 8.6 MG: 8.6 TABLET ORAL at 22:09

## 2021-01-04 RX ADMIN — ACETAMINOPHEN 1000 MG: 500 TABLET ORAL at 08:49

## 2021-01-04 RX ADMIN — PRAVASTATIN SODIUM 40 MG: 40 TABLET ORAL at 22:09

## 2021-01-04 RX ADMIN — METOPROLOL TARTRATE 50 MG: 50 TABLET, FILM COATED ORAL at 22:10

## 2021-01-04 RX ADMIN — METOPROLOL TARTRATE 50 MG: 50 TABLET, FILM COATED ORAL at 09:07

## 2021-01-04 RX ADMIN — ASPIRIN 81 MG CHEWABLE TABLET 81 MG: 81 TABLET CHEWABLE at 08:49

## 2021-01-04 ASSESSMENT — PAIN SCALES - GENERAL
PAINLEVEL_OUTOF10: 5
PAINLEVEL_OUTOF10: 0
PAINLEVEL_OUTOF10: 4
PAINLEVEL_OUTOF10: 5

## 2021-01-04 ASSESSMENT — PAIN - FUNCTIONAL ASSESSMENT
PAIN_FUNCTIONAL_ASSESSMENT: ACTIVITIES ARE NOT PREVENTED
PAIN_FUNCTIONAL_ASSESSMENT: ACTIVITIES ARE NOT PREVENTED

## 2021-01-04 ASSESSMENT — PAIN DESCRIPTION - FREQUENCY: FREQUENCY: CONTINUOUS

## 2021-01-04 ASSESSMENT — PAIN DESCRIPTION - ORIENTATION: ORIENTATION: LOWER

## 2021-01-04 ASSESSMENT — PAIN DESCRIPTION - ONSET: ONSET: ON-GOING

## 2021-01-04 ASSESSMENT — PAIN DESCRIPTION - DESCRIPTORS
DESCRIPTORS: ACHING
DESCRIPTORS: ACHING

## 2021-01-04 ASSESSMENT — PAIN DESCRIPTION - LOCATION: LOCATION: BACK

## 2021-01-04 ASSESSMENT — PAIN DESCRIPTION - PROGRESSION: CLINICAL_PROGRESSION: GRADUALLY IMPROVING

## 2021-01-04 ASSESSMENT — PAIN DESCRIPTION - PAIN TYPE
TYPE: ACUTE PAIN
TYPE: ACUTE PAIN

## 2021-01-04 NOTE — CARE COORDINATION
Patient DME order for [facility] and  given to [facility] with [facility] DME.    1:10 PM  Brandi Ingram referral for [place] [address] PT, OT, and Nursing sent to [facility] at [phone].    2:45 PM  Patient follow-ups set with Dr. Quintin Ohara for 01/07/21 at 11:20 AM and Dr. Harry Longoria for 01/06/21 at 3:30 PM.

## 2021-01-04 NOTE — PROGRESS NOTES
Occupational Therapy    Physical Rehabilitation: OCCUPATIONAL THERAPY     [x] daily progress note       [x] discharge       Patient Name:  Eva Camarena   :  1950 MRN: [id number]  Room:  [address] [place] [address] Date of Admission: 2020  Rehabilitation Diagnosis:   Spinal stenosis, lumbar region with neurogenic claudication [M48.062]  Spinal stenosis of lumbar region with radiculopathy [M48.061, M54.16]       Date 2021       Day of ARU Week:  4   Time IN/OUT 1105/1205   Individual Tx Minutes 60   Group Tx Minutes    Co-Treat Minutes    Concurrent Tx Minutes    TOTAL Tx Time Mins 60   Variance Time    Variance Time []   Refusal due to:     []   Medical hold/reason:    []   Illness   []   Off Unit for test/procedure  []   Extra time needed to complete task  []   Therapeutic need  []   Other (specify):   Restrictions Restrictions/Precautions: Weight Bearing, Fall Risk, General Precautions(B LE WBAT, back brace on when up)         Communication with other providers: [x]   OK to see per nursing:     []   Spoke with team member regarding:      Subjective observations and cognitive status: Pt in bed on approach, significant other present for caregiver training; receptive and pleasant. Pain level/location:   2 /10       Location: Lower back    Discharge recommendations  Anticipated discharge date:    Destination: []? ???home alone   []? ???home alone w assist prn   [x]???? home w/ family    []???? Continuous supervision       []????SNF    []???? Assisted living     []???? Other:   Continued therapy: [x]????HHC OT  []????OUTPATIENT  OT   []???? No Further OT  Equipment needs: has BSC and grab bars.  Mehul shower chair with back       ADLs:    Eating: Eating  Assistance Needed: Independent  Comment: Per pt report  CARE Score: 6       Oral Hygiene: Oral Hygiene  Assistance Needed: Independent  Comment: seated  CARE Score: 6  Discharge Goal: Independent    UB/LB Bathing: Shower/Bathe Self  Assistance Needed: CGA within room  Device:   [x]   Rolling Walker     []   Standard Rosa Drivers []   Wheelchair        []   Valeriy Lion       []   Bunnedson Mcdonaldhew         []   Cardiac Rosa Drivers       []   Other:      Additional Therapeutic activities/exercises completed this date:     [x]   ADL Training   [x]   Balance/Postural training     [x]   Bed/Transfer Training: In prep for d/c home with tub/shower combo, educated on benefits of shower chair vs TTB. Educated that pt was able to step in/out over edge of tub c CGA with this therapist; S/O to purchase shower chair and aware of recommendation for it to have a back to it   [x]   Endurance Training   []   Neuromuscular Re-ed   []   Nu-step:  Time:        Level:         #Steps:       []   Rebounder:    []  Seated     []  Standing        []   Supine Ther Ex (reps/sets):     []   Seated Ther Ex (reps/sets):     []   Standing Ther Ex (reps/sets):     []   Other:      Comments: All intervention performed to increase pt's endurance, ax tolerance, balance,  and I c ADLs/IADLs and functional transfers/mobility. Patient/Caregiver Education and Training:   [x]   Adaptive Equipment Use  [x]   Bed Mobility/Transfer Technique/Safety  []   Energy Conservation Tips  [x]   Family training: Educated S/O on spinal precautions, how this affects AE performance and how to use LB AE, how to sequence bathing ADLs so that LSO is on whenever pt is standing/transferring, and cues pt requires for safety and precaution compliance d/t decreased carryover. Also educated on equipment (how [facility] can be placed over toilet to assist with transfer which this therapist is recommending, also see above for tub/shower combo transfer).   S/O verbalized understanding and also began cueing pt on spinal precautions during session  [x]   Postural Awareness  [x]   Safety During Functional Activities  [x]   Reinforced Patient's Precautions   []   Progress was updated and reviewed in Rehabtracker with patient and/or family this date.    Treatment Plan for Next Session: Planned d/c from ARU next date     Assessment:  Pt has made significant progress during ARU stay however continues to demonstrate decreased carryover of AE use, proper LSO application, and overall demo's memory deficits requiring supervision. CGT was completed this date      Treatment/Activity Tolerance:   [x] Tolerated treatment with no adverse effects    [] Patient limited by fatigue  [] Patient limited by pain   [] Patient limited by medical complications:    [] Adverse reaction to Tx:   [] Significant change in status    Safety:       []  bed alarm set    [x]  chair alarm set    []  Pt refused alarms                []  Telesitter activated      [x]  Gait belt used during tx session      []other:       Number of Minutes/Billable Intervention  Therapeutic Exercise    ADL Self-care 50   Neuro Re-Ed    Therapeutic Activity 10   Group    Other:    TOTAL 60       Social History  Social/Functional History  Lives With: Significant other  Type of Home: House  Home Layout: One level  Home Access: Ramped entrance  Bathroom Shower/Tub: Tub/Shower unit, Shower chair without back(Pt does not use shower chair - pt bathes in tub.)  Bathroom Toilet: Standard  Bathroom Equipment: Grab bars in shower  Home Equipment: Rolling walker, Cane(Pt uses rollator at baseline, also has access to cane.)  ADL Assistance: [facility] [address]: (Rollator)  Homemaking Responsibilities: No(Pt's girlfriend performs IADLs)  Ambulation Assistance: Independent(Mod I with use of rollator)  Transfer Assistance: Independent  Active : No  Patient's  Info: Pt's girlfriend drives  Education: Some college  Occupation: Retired  Leisure & Hobbies: Martial arts  IADL Comments: Pt manages own medications. Additional Comments: Pt sleeps on flat bed at baseline. Pt reports no falls in the past year.     Objective Goals:  (Update in navigator)  Short term goals  Time Frame for Short term goals: STG=LTG:  Long term goals  Time Frame for Long term goals : 10-14 days  Long term goal 1: Pt will perform all grooming tasks with Mod I. MET seated  Long term goal 2: Pt will perform sponge bathing with Min A and AE/DME. MET; supervision  Long term goal 3: Pt will perform UB dressing with Mod I. NMET; supervision d/t requiring cues for LSO  Long term goal 4: Pt will perform LB dressing with Min A and AE/DME. MET: CGA  Long term goal 5: Pt will don/doff footwear with Mod I and AE. NMET; supervision  Long term goals 6: Pt will complete toileting with Min A and AE/DME. MET; SBA  Long term goal 7: Pt will complete all functional transfers (toilet, tub, bed) with Min A and DME. MET; CGA-min A  Long term goal 8: Pt will participate in therex/theract with emphasis on static stance >3-5 min to increase standing tolerance and endurance needed for ADLs/IADLs. :  Ongoing       Plan of Care                                                                              Times per week: 5 days per week for a minimum of 60 minutes/day plus group as appropriate for 60 minutes. Treatment to include Plan  Times per week: 5x/week  Times per day: Daily  Plan weeks: 10-14 days  Specific instructions for Next Treatment: LB AE education  Current Treatment Recommendations: Strengthening, Wheelchair Mobility Training, Pain Management, Positioning, ROM, Gait Training, Safety Education & Training, Balance Training, Stair training, Patient/Caregiver Education & Training, Self-Care / ADL, Functional Mobility Training, Equipment Evaluation, Education, & procurement, Home Management Training, Endurance Training    Electronically signed by   Cameron Alcocer MS, OTR/L  License #[id number]  1/4/2021, 12:47 PM

## 2021-01-04 NOTE — PROGRESS NOTES
Physical Therapy    [x] daily progress note       [x] discharge       Patient Name:  David Griffiths   :  1950 MRN: [id number]  Room:  [address] [place], [place] [address] Date of Admission: 2020  Rehabilitation Diagnosis:   Spinal stenosis, lumbar region with neurogenic claudication [M48.062]  Spinal stenosis of lumbar region with radiculopathy [M48.061, M54.16]       Date 2021       Day of ARU Week:  4   Time IN/OUT [id number]  [id number]   Individual Tx Minutes 65+55   TOTAL Tx Time Mins 120   Variance Time    Variance Time []   Refusal due to:     []   Medical hold/reason:    []   Illness   []   Off Unit for test/procedure  []   Extra time needed to complete task  []   Therapeutic need  []   Other (specify):   Restrictions Restrictions/Precautions  Restrictions/Precautions: Weight Bearing, Fall Risk, General Precautions(B LE WBAT, back brace on when up)  Required Braces or Orthoses?: Yes  Position Activity Restriction  Spinal Precautions: No Bending, No Lifting, No Twisting  Spinal Precautions: Educated pt on precautions, pt verbalized understanding. Other position/activity restrictions: Wound vac on lumbar spine, newell catheter. Communication with other providers: [x]   OK to see per nursing:     []   Spoke with team member regarding:      Subjective observations and cognitive status: CON Loco present for caregiver training. Pt cooperative to session. SO reports pt \"looks about how he did before the surgery\", better than she had anticipated, reports feels confident in A pt at home. PM: Pt resting in bed, agreeable to tx   Pain level/location:  0  /10  In AM; 5/10    Location: L knee, reports receiving meds ~ 30' ago.    Discharge recommendations  Anticipated discharge date:  2020  Destination: []????????home alone   []????????home alone with assist PRN     [x]???????? home w/ friend Maryjane      [x]???????? Continuous supervision  []????????SNF    []???????? Assisted living     []???????? Other:  Continued therapy: [x]????????HHC PT  []????????OUTPATIENT  PT   []???????? No Further PT  []????????SNF PT  Caregiver training recommended: []?????? ? ? Yes  []???????? No   Equipment needs:RW and manual WC  Liam Nogueira Phenes the assistance of a wheeled walker to successfully ambulate from room to room at home to allow completion of daily living tasks such as: bathing, toileting, dressing and grooming.  A wheeled walker is necessary due to the patient's unsteady gait, upper body weakness, inability to  a standard walker.  This patient can ambulate only by pushing a walker instead of using a lesser assistive device such as a cane or crutch. Bailey Glance a standard wheelchair to successfully complete daily living tasks such as: bathing, toileting, dressing and grooming; or any other daily living task in the home.  A standard manual wheelchair is necessary due to patient's impaired ambulation and mobility restrictions and would be unable to resolve these daily living tasks using a cane or walker.  The patient is capable of using/self-propelling a standard wheelchair safely in their home and can maneuver within their home with adequate access in a reasonable amount of time. The patient has not expressed an unwillingness to use the wheelchair. Having this wheelchair would enable Liam Barcenas Sr. ability to regularly participate in the above mentioned daily living tasks around the home. There is a caregiver available to provide necessary assistance.    Expected length of need is lifetime.    Meri Manjarrez Sr. does not have any infectious diagnoses. Meri Manjarrez Sr. can self propel a wheelchair and needs anti-rollback device to propel up ramps.      Bed Mobility:           []   Pt received out of bed   Rolling R/L:  Supervision  Scooting:  Indep to EOB  Lying --> Sit:  Supervision  Sit --> lying:  Supervision  Bed mobility performed without bed features, pt needing cueing for spinal technique/safety  [x]   Gait technique/sequencing  [x]   Proper use of assistive device  []   Advanced mobility safety and technique  []   Reinforced patient's precautions/mobility while maintaining precautions  []   Postural awareness  [x]   Family training: Pts AP Perez educated on pt's current mobility status/MANJIT required for bed mobility, transfers, ambulation, car transfers, and entry into home. Education provided to caregiver for safety cues required for pt to perform tasks. Pre-mobility education provided including application and use of gait belt, safe guarding technique, patient's spinal precautions, cognitive status and/or deficits, and proper body mechanics. Education and demonstration also provided regarding floor to stand transfer. Shana Smithpam voiced understanding and able to demonstrate safety with application of gait belt, and guarding pt during mobility and transfers after initial cueing. [x]   Progress was updated and reviewed in Rehabtracker with patient and/or family this date.     Treatment Plan for Next Session: Pt to be discharged to home with continuous support of S.O., [facility] PT recommended      Treatment/Activity Tolerance:   [x] Tolerated treatment with no adverse effects    [] Patient limited by fatigue  [] Patient limited by pain   [] Patient limited by medical complications:    [] Adverse reaction to Tx:   [] Significant change in status    Safety:       [x]  bed alarm set    []  chair alarm set    []  Pt refused alarms                []  Telesitter activated      [x]  Gait belt used during tx session      []other:         Number of Minutes/Billable Intervention  Gait Training 30   Therapeutic Exercise 30   Neuro Re-Ed    Therapeutic Activity 60   Wheelchair Propulsion    Group    Other:    TOTAL 120         Social History  Social/Functional History  Lives With: Significant other  Type of Home: House  Home Layout: One level  Home Access: Ramped entrance  Bathroom Shower/Tub: Tub/Shower unit, Shower chair without back(Pt does not use shower chair - pt bathes in tub.)  Bathroom Toilet: Standard  Bathroom Equipment: Grab bars in shower  Home Equipment: Rolling walker, Cane(Pt uses rollator at baseline, also has access to cane.)  ADL Assistance: [address]: (Rollator)  Homemaking Responsibilities: No(Pt's girlfriend performs IADLs)  Ambulation Assistance: Independent(Mod I with use of rollator)  Transfer Assistance: Independent  Active : No  Patient's  Info: Pt's girlfriend drives  Education: Some college  Occupation: Retired  Leisure & Hobbies: Martial arts  IADL Comments: Pt manages own medications. Additional Comments: Pt sleeps on flat bed at baseline. Pt reports no falls in the past year. Objective                                                                                    Goals:  (Update in navigator)   : Long term goals  Time Frame for Long term goals : In 10 days:  Long term goal 1: Pt will complete all bed mobility independently  Long term goal 2: Pt will complete sit <> stand transfers with modAx1  Long term goal 3: Pt will ambulate 20 feet with modAx2 with LRAD  Long term goal 4: Pt will propel manual w/c 150 feet independently  Long term goal 5: Pt will independently complete 3 sets of 10 reps of BLE AROM exercises in available and allowed ROM:        Plan of Care                                                                              Times per week: 5 days per week for a minimum of 60 minutes/day plus group as appropriate for 60 minutes.   Treatment to include Current Treatment Recommendations: Strengthening, ROM, Balance Training, Transfer Training, ADL/Self-care Training, Functional Mobility Training, IADL Training, Neuromuscular Re-education, Safety Education & Training, Home Exercise Program, Endurance Training, Patient/Caregiver Education & Training, Equipment Evaluation, Education, & procurement, Gait Training, Pain Management,

## 2021-01-04 NOTE — CARE COORDINATION
Rakesh Owens Sr. requires the assistance of a wheeled walker to successfully ambulate from room to room at home to allow completion of daily living tasks such as: bathing, toileting, dressing and grooming.  A wheeled walker is necessary due to the patient's unsteady gait, upper body weakness, inability to  a standard walker.  This patient can ambulate only by pushing a walker instead of using a lesser assistive device such as a cane or crutch. Veronica Fang a standard wheelchair to successfully complete daily living tasks such as: bathing, toileting, dressing and grooming; or any other daily living task in the home.  A standard manual wheelchair is necessary due to patient's impaired ambulation and mobility restrictions and would be unable to resolve these daily living tasks using a cane or walker.  The patient is capable of using/self-propelling a standard wheelchair safely in their home and can maneuver within their home with adequate access in a reasonable amount of time. The patient has not expressed an unwillingness to use the wheelchair. Having this wheelchair would enable Liam Black Sr. ability to regularly participate in the above mentioned daily living tasks around the home. There is a caregiver available to provide necessary assistance.    Expected length of need is lifetime.    Rakesh Owens Sr. does not have any infectious diagnoses. Rakesh Owens Sr. can self propel a wheelchair and needs anti-rollback device to propel up ramps.

## 2021-01-05 VITALS
BODY MASS INDEX: 29.02 KG/M2 | RESPIRATION RATE: 16 BRPM | DIASTOLIC BLOOD PRESSURE: 64 MMHG | OXYGEN SATURATION: 96 % | SYSTOLIC BLOOD PRESSURE: 119 MMHG | HEART RATE: 80 BPM | WEIGHT: 214.3 LBS | TEMPERATURE: 97.6 F | HEIGHT: 72 IN

## 2021-01-05 LAB — GLUCOSE BLD-MCNC: 96 MG/DL (ref 70–99)

## 2021-01-05 PROCEDURE — 82962 GLUCOSE BLOOD TEST: CPT

## 2021-01-05 PROCEDURE — 99239 HOSP IP/OBS DSCHRG MGMT >30: CPT | Performed by: PHYSICAL MEDICINE & REHABILITATION

## 2021-01-05 PROCEDURE — 6370000000 HC RX 637 (ALT 250 FOR IP): Performed by: PHYSICAL MEDICINE & REHABILITATION

## 2021-01-05 RX ORDER — SENNA PLUS 8.6 MG/1
1 TABLET ORAL NIGHTLY
Qty: 30 TABLET | Refills: 0 | COMMUNITY
Start: 2021-01-05 | End: 2021-02-04

## 2021-01-05 RX ORDER — POLYETHYLENE GLYCOL 3350 17 G/17G
17 POWDER, FOR SOLUTION ORAL DAILY
Qty: 527 G | Refills: 1 | COMMUNITY
Start: 2021-01-06 | End: 2021-02-05

## 2021-01-05 RX ORDER — PSEUDOEPHEDRINE HCL 30 MG
100 TABLET ORAL DAILY
COMMUNITY
Start: 2021-01-06 | End: 2021-05-27

## 2021-01-05 RX ORDER — OXYCODONE HYDROCHLORIDE 5 MG/1
5 TABLET ORAL EVERY 4 HOURS PRN
Qty: 20 TABLET | Refills: 0 | Status: SHIPPED | OUTPATIENT
Start: 2021-01-05 | End: 2021-01-12

## 2021-01-05 RX ADMIN — METOPROLOL TARTRATE 50 MG: 50 TABLET, FILM COATED ORAL at 08:26

## 2021-01-05 RX ADMIN — OXYCODONE HYDROCHLORIDE 10 MG: 5 TABLET ORAL at 08:28

## 2021-01-05 RX ADMIN — ASPIRIN 81 MG CHEWABLE TABLET 81 MG: 81 TABLET CHEWABLE at 08:27

## 2021-01-05 RX ADMIN — ACETAMINOPHEN 1000 MG: 500 TABLET ORAL at 08:28

## 2021-01-05 RX ADMIN — ACETAMINOPHEN 1000 MG: 500 TABLET ORAL at 13:14

## 2021-01-05 RX ADMIN — DOCUSATE SODIUM 100 MG: 100 CAPSULE ORAL at 08:27

## 2021-01-05 ASSESSMENT — PAIN SCALES - GENERAL
PAINLEVEL_OUTOF10: 5
PAINLEVEL_OUTOF10: 5

## 2021-01-05 NOTE — CARE COORDINATION
Some confusion at discharge about patient's walker at discharge. Patient states he received a standard walker with insurance and purchased a rollator. Case mgt checked with [facility] DME. When researched, confirmed patient did receive a standard walker, not a wheeled walker, and is eligible for wheels. Patient requests wheels be delivered to his home tomorrow as there is someone waiting to help him into the home at this time. Burrell Homans is waiting in the car to take him home. Case mgt reviewed that patient would be safest with rolling walker wheels today as it's now he's trained with therapy. Patient continues to request wheel delivery.

## 2021-01-05 NOTE — CARE COORDINATION
Patient follow-ups set. DME order sent to [facility] and DME delivered to patient room. [facility] order sent to [facility] and discharge summary to be sent once available. Patient's friend to provide transport home. Patient is set for discharge from case management standpoint.

## 2021-01-05 NOTE — PROGRESS NOTES
Pt discharged to home with home health care and documented belongings. Home with wheelchair, personal rollator. Discharge instructions explained and questions answered. Taken to front entrance and assisted into private vehicle.

## 2021-01-05 NOTE — PLAN OF CARE
Problem: Pain:  Goal: Pain level will decrease  Description: Pain level will decrease  1/5/2021 0011 by Aaliyah Jin RN  Outcome: Ongoing  1/4/2021 1916 by Molina Jenkins RN  Outcome: Ongoing  Goal: Control of acute pain  Description: Control of acute pain  1/5/2021 0011 by Aaliyah Jin RN  Outcome: Ongoing  1/4/2021 1916 by Molina Jenkins RN  Outcome: Ongoing  Goal: Control of chronic pain  Description: Control of chronic pain  1/5/2021 0011 by Aaliyah Jin RN  Outcome: Ongoing  1/4/2021 1916 by Molina Jenkins RN  Outcome: Ongoing     Problem: Skin Integrity:  Goal: Will show no infection signs and symptoms  Description: Will show no infection signs and symptoms  1/5/2021 0011 by Aaliyah Jin RN  Outcome: Ongoing  1/4/2021 1916 by Molina Jenkins RN  Outcome: Ongoing  Goal: Absence of new skin breakdown  Description: Absence of new skin breakdown  1/5/2021 0011 by Aaliyah Jin RN  Outcome: Ongoing  1/4/2021 1916 by Molina Jenkins RN  Outcome: Ongoing

## 2021-01-05 NOTE — PROGRESS NOTES
Marlen Shelton Sr.    : 1950  Acct #: [de-identified]  MRN: [id number]              PM&R Progress Note      Admitting diagnosis: Lumbar spinal stenosis with radiculopathy (IGC 3.9)     Comorbid diagnoses impacting rehabilitation: Uncontrolled pain, generalized weakness, gait disturbance, acute urinary retention, essential hypertension, uncontrolled diabetes type 2 with peripheral neuropathy, COPD, status post left total knee arthroplasty.     Chief complaint: Mild back and left leg pain as before. No chills or cough. Looking forward to discharge tomorrow. Prior (baseline) level of function: Independent. Current level of function:         Current  IRF-MIREILLE and Goals:   Hearing, Speech, and Vision  Expression of Ideas and Wants: Without difficulty  Understanding Verbal and Non-Verbal Content: Understands  Prior Functioning: Everyday Activities  Self Care: Independent  Indoor Mobility (Ambulation):  Independent  Stairs: Independent  Functional Cognition: Independent  Prior Device Use: Walker    Eating  Assistance Needed: Independent  Comment: Per pt report  CARE Score: 6  Oral Hygiene  Assistance Needed: Independent  Comment: seated  CARE Score: 6  Discharge Goal: Independent  Toileting Hygiene  Assistance Needed: Supervision or touching assistance  Comment: SBA  Reason if not Attempted: Not attempted due to medical condition or safety concerns(Cordero catheter)  CARE Score: 4  Discharge Goal: Partial/moderate assistance  Shower/Bathe Self  Assistance Needed: Supervision or touching assistance  Comment: using LHS  CARE Score: 4  Discharge Goal: Partial/moderate assistance  Upper Body Dressing  Assistance Needed: Supervision or touching assistance  Comment: requires cues for LSO management, does not demonstrate consistent carryover of how to properly don/tighten  CARE Score: 4  Discharge Goal: Independent  Lower Body Dressing  Assistance Needed: Supervision or touching assistance  Comment: CGA while in stance performing pants management, min increased time  CARE Score: 4  Discharge Goal: Partial/moderate assistance  Putting On/Taking Off Footwear  Assistance Needed: Supervision or touching assistance  Comment: required cues to initiate using reacher to doff socks and sock aid to don (attempted to perform deep bend to complete task)  CARE Score: 4  Discharge Goal: Independent    Roll Left and Right  Assistance Needed: Supervision or touching assistance  CARE Score: 4  Discharge Goal: Independent  Sit to Lying  Assistance Needed: Supervision or touching assistance  CARE Score: 4  Discharge Goal: Independent  Sit to Stand  Assistance Needed: Supervision or touching assistance  Comment: stedy  CARE Score: 4  Discharge Goal: Partial/moderate assistance  Chair/Bed-to-Chair Transfer  Assistance Needed: Supervision or touching assistance  Comment: steady  CARE Score: 4  Discharge Goal: Partial/moderate assistance  Toilet Transfer  Assistance Needed: Supervision or touching assistance  Comment: SBA using RW, BSC frame over toilet  CARE Score: 4  Discharge Goal: Partial/moderate assistance  Car Transfer  Assistance Needed: Supervision or touching assistance  Comment: not safe to attempt at this time due to patient dependent x2 with use of stedy for sit <> stand transfers  Reason if not Attempted: Not attempted due to medical condition or safety concerns  CARE Score: 4  Discharge Goal: Partial/moderate assistance   Walk 10 Feet?   Walk 10 Feet?: Yes  1 Step  Reason if not Attempted: Not attempted due to medical condition or safety concerns  Picking Up Object  Assistance Needed: Supervision or touching assistance  Comment: not safe to attempt at this time due to patient dependent x2 with use of stedy for sit <> stand transfers  Reason if not Attempted: Not attempted due to medical condition or safety concerns  CARE Score: 4  Discharge Goal: Partial/moderate assistance  Wheelchair Ability  Uses a Wheelchair and/or Scooter?: Yes  Wheel 50 Feet with Two Turns  Assistance Needed: Independent  Physical Assistance Level: No physical assistance  CARE Score: 6  Discharge Goal: Independent  Wheel 150 Feet  Assistance Needed: Independent  Physical Assistance Level: 50%-74%  Comment: patient fatigued with 50 feet of w/c propulsion and required assistance to propel manual w/c remaining distance  CARE Score: 6  Discharge Goal: Independent            I      Exam:    Blood pressure (!) 140/77, pulse 85, temperature 97.5 °F (36.4 °C), temperature source Oral, resp. rate 16, height 6' (1.829 m), weight 215 lb 14.4 oz (97.9 kg), SpO2 98 %. General: Up in a wheelchair. Legs elevated. In no distress. HEENT: Talkative. Neck supple. Pulmonary: Unlabored and clear. Cardiac: Regular rate and rhythm. Abdomen: Patient's abdomen is soft and nondistended. Bowel sounds were present throughout. There was no rebound, guarding or masses noted. Upper extremities: Strong and well coordinated. Lower extremities: Guarded movements of the left knee and ankle but no new swelling or signs of DVT. Sitting balance was good. Standing balance was fair-.    Lab Results   Component Value Date    WBC 8.5 05/26/2017    HGB 14.6 05/26/2017    HCT 46.5 05/26/2017    MCV 88.1 05/26/2017     05/26/2017     Lab Results   Component Value Date    INR 1.06 02/09/2011    PROTIME 11.6 02/09/2011     Lab Results   Component Value Date    CREATININE 0.9 10/19/2017    BUN 12 10/19/2017     10/19/2017    K 4.5 10/19/2017     10/19/2017    CO2 26 10/19/2017     Lab Results   Component Value Date    ALT 30 08/16/2018    AST 22 08/16/2018    ALKPHOS 110 08/16/2018    BILITOT 0.3 08/16/2018       Expected length of stay  prior to a supervised level of function for discharge home with a walker and [facility] OT/PT is 1/5/2021. Recommendations:    1.  Lumbar spinal stenosis with radiculopathy and gait disturbance:   He has benefited from participating in the daily occupational and physical therapy.  Home with home care OT and PT tomorrow.   Voiding well after removal of his Cordero (on Flomax).     Using an LSO when out of bed.  Weightbearing as tolerated per his surgeon.  No signs of wound infection.  Stand pivot transfers with a walker today. No complications with removing his staples. Tolerating his current medications. 2. DVT prophylaxis: With recent spinal surgery he has relative contraindications to chemoprophylaxis.     Using SCDs when in bed.  Weightbearing activities are increasing daily. Viktoria Speaker current signs of thrombosis. 3. Acute urinary retention: Cordero catheter was removed and his voiding trial has been successful.  Continue Flomax.  No dysuria or incontinence. 4. Uncontrolled pain: Cryotherapy, LSO and oral analgesics.  Benefiting from the scheduled Tylenol 4 times daily and have oxycodone available as needed.  Flexeril as needed. Aggressive bowel intervention while on the narcotics. 5. Uncontrolled diabetes with hyperglycemia: CCD diet.  Blood sugars are checked at before meals and at bedtime.  Oral Metformin and sliding scale Humalog.  Recent blood sugars are consistently less than 150.  Blood sugars twice daily. 6. Hypertension: Lopressor for blood pressure control.  Target systolic blood pressure is 120-140.  Vital signs are checked at rest and with activity.  Blood pressure in target range recently. 7.  COPD: Aggressive pulmonary hygiene.  Monitoring O2 saturations as symptoms dictate.  Albuterol inhaler as needed.

## 2021-01-24 NOTE — DISCHARGE SUMMARY
Patient Name: Delilah Mackenzie .  Patient :  1950  Patient MRN:   [id number]      Admission Date:  2020  Discharge Date: 2021. Admitting diagnosis: Lumbar spinal stenosis with radiculopathy (IGC 3.9)     Comorbid diagnoses impacting rehabilitation: Uncontrolled pain, generalized weakness, gait disturbance, acute urinary retention, essential hypertension, uncontrolled diabetes type 2 with peripheral neuropathy, COPD, status post left total knee arthroplasty.     Discharging diagnosis: Lumbar spinal stenosis with radiculopathy (IGC 3.9)     Comorbid diagnoses impacting rehabilitation: Uncontrolled pain, generalized weakness, gait disturbance, acute urinary retention, essential hypertension, uncontrolled diabetes type 2 with peripheral neuropathy, COPD, status post left total knee arthroplasty.      History of present illness: The patient is a 43-year-old right-hand-dominant male who is been struggling with spinal and lower limb pain for years. Earlier in  he had a left knee replacement surgery. His recovery has been hampered by back pain and left leg weakness. Outpatient therapies and medications failed to correct his symptoms so on 2020 Dr. Flynn Medley performed a multilevel decompressive laminectomy at [facility].  The patient had significant incisional pain compounding his normal low back pain which radiated into his left leg. He had poor control of his left leg and he was not able to empty his bladder. A voiding trial was unsuccessful and his Cordero catheter was replaced. He had fluctuating blood pressures, poorly controlled pain and generalized weakness. Prior (baseline) level of function: Independent.     Current level of function:         Current  IRF-MI[facility] and Goals:   Hearing, Speech, and Vision  Expression of Ideas and Wants: Without difficulty  Understanding Verbal and Non-Verbal Content: Understands  Prior Functioning: Everyday Activities  Self Care: Independent  Indoor Mobility (Ambulation):  Independent  Stairs: Independent  Functional Cognition: Independent  Prior Device Use: Walker    Eating  Assistance Needed: Independent  Comment: Per pt report  CARE Score: 6  Oral Hygiene  Assistance Needed: Independent  Comment: seated  CARE Score: 6  Discharge Goal: Independent  Toileting Hygiene  Assistance Needed: Supervision or touching assistance  Comment: SBA  Reason if not Attempted: Not attempted due to medical condition or safety concerns(Cordero catheter)  CARE Score: 4  Discharge Goal: Partial/moderate assistance  Shower/Bathe Self  Assistance Needed: Supervision or touching assistance  Comment: using LHS  CARE Score: 4  Discharge Goal: Partial/moderate assistance  Upper Body Dressing  Assistance Needed: Supervision or touching assistance  Comment: requires cues for LSO management, does not demonstrate consistent carryover of how to properly don/tighten  CARE Score: 4  Discharge Goal: Independent  Lower Body Dressing  Assistance Needed: Supervision or touching assistance  Comment: CGA while in stance performing pants management, min increased time  CARE Score: 4  Discharge Goal: Partial/moderate assistance  Putting On/Taking Off Footwear  Assistance Needed: Supervision or touching assistance  Comment: required cues to initiate using reacher to doff socks and sock aid to don (attempted to perform deep bend to complete task)  CARE Score: 4  Discharge Goal: Independent    Roll Left and Right  Assistance Needed: Supervision or touching assistance  CARE Score: 4  Discharge Goal: Independent  Sit to Lying  Assistance Needed: Supervision or touching assistance  CARE Score: 4  Discharge Goal: Independent  Sit to Stand  Assistance Needed: Supervision or touching assistance  Comment: stedy  CARE Score: 4  Discharge Goal: Partial/moderate assistance  Chair/Bed-to-Chair Transfer  Assistance Needed: Supervision or touching assistance  Comment: steady  CARE Score: 4  Discharge Goal: Partial/moderate assistance  Toilet Transfer  Assistance Needed: Supervision or touching assistance  Comment: SBA using RW, BSC frame over toilet  CARE Score: 4  Discharge Goal: Partial/moderate assistance  Car Transfer  Assistance Needed: Supervision or touching assistance  Comment: not safe to attempt at this time due to patient dependent x2 with use of stedy for sit <> stand transfers  Reason if not Attempted: Not attempted due to medical condition or safety concerns  CARE Score: 4  Discharge Goal: Partial/moderate assistance   Walk 10 Feet? Walk 10 Feet?: Yes  1 Step  Reason if not Attempted: Not attempted due to medical condition or safety concerns  Picking Up Object  Assistance Needed: Supervision or touching assistance  Comment: not safe to attempt at this time due to patient dependent x2 with use of stedy for sit <> stand transfers  Reason if not Attempted: Not attempted due to medical condition or safety concerns  CARE Score: 4  Discharge Goal: Partial/moderate assistance  Wheelchair Ability  Uses a Wheelchair and/or Scooter?: Yes  Wheel 50 Feet with Two Turns  Assistance Needed: Independent  Physical Assistance Level: No physical assistance  CARE Score: 6  Discharge Goal: Independent  Wheel 150 Feet  Assistance Needed: Independent  Physical Assistance Level: 50%-74%  Comment: patient fatigued with 50 feet of w/c propulsion and required assistance to propel manual w/c remaining distance  CARE Score: 6  Discharge Goal: Independent            I      Exam:    Blood pressure 119/64, pulse 80, temperature 97.6 °F (36.4 °C), temperature source Oral, resp. rate 16, height 6' (1.829 m), weight 214 lb 4.8 oz (97.2 kg), SpO2 96 %. General: Sitting up in a bedside chair. Alert and oriented. In no distress. HEENT: Full visual field. MMM. Talkative. Neck supple. Pulmonary: Symmetric air exchange without coughing. Cardiac: Regular rate and rhythm.     Abdomen: Patient's abdomen is soft and nondistended. Bowel sounds were present throughout. There was no rebound, guarding or masses noted. Upper extremities: Strong and well coordinated. Lower extremities: Guarded movements of the left knee and ankle but no new swelling or signs of DVT. Sitting balance was good. Standing balance was fair-.    Lab Results   Component Value Date    WBC 8.5 05/26/2017    HGB 14.6 05/26/2017    HCT 46.5 05/26/2017    MCV 88.1 05/26/2017     05/26/2017     Lab Results   Component Value Date    INR 1.06 02/09/2011    PROTIME 11.6 02/09/2011     Lab Results   Component Value Date    CREATININE 0.9 10/19/2017    BUN 12 10/19/2017     10/19/2017    K 4.5 10/19/2017     10/19/2017    CO2 26 10/19/2017     Lab Results   Component Value Date    ALT 30 08/16/2018    AST 22 08/16/2018    ALKPHOS 110 08/16/2018    BILITOT 0.3 08/16/2018           The patient presented to the ARU with the above history requiring a multidisciplinary treatment plan including close medical supervision by the Alana Stauffer Director. The patient participated in the prescribed therapy treatment plan with reasonable compliance and progressive tolerance. They avoided significant medical complications. By the time of discharge the patient had become safer with adaptive equipment to transfer and toilet with a FWW with the supervision of family/caregivers. The patient was tolerating an oral diet without choking/coughing and was back to their baseline with regards to bowel and bladder control. Discharge instructions were reviewed with the patient. The patient is to follow up with the surgeon and his PCP in 1 week. No driving. [facility] PT/OT will be arranged. Cheng Carnes. Home Medication Instructions EMQ:[id number]    Printed on:01/24/21 1128   Medication Information                      Acetaminophen (TYLENOL 8 HOUR PO)  Take by mouth             aspirin 81 MG tablet  Take 81 mg by mouth daily.                docusate sodium (COLACE, DULCOLAX) 100 MG CAPS  Take 100 mg by mouth daily             metFORMIN (GLUCOPHAGE) 500 MG tablet  Take 500 mg by mouth daily (with breakfast)             metoprolol tartrate (LOPRESSOR) 50 MG tablet  Take 1 tablet by mouth 2 times daily             polyethylene glycol (GLYCOLAX) 17 g packet  Take 17 g by mouth daily             pravastatin (PRAVACHOL) 40 MG tablet  Take 1 tablet by mouth daily             senna (SENOKOT) 8.6 MG tablet  Take 1 tablet by mouth nightly             Travoprost, ILYA Free, (TRAVATAN Z) 0.004 % SOLN ophthalmic solution  Place 1 drop into both eyes nightly. CONDITION ON DISCHARGE: Stable. The prognosis is fair+ for further improvements in ADL's and safety with adapted gait/transfers. Record review, patient exam, discharge instructions, medication reconciliation and summary for this discharge visit took more than 30 minutes.

## 2021-02-22 RX ORDER — METOPROLOL TARTRATE 50 MG/1
50 TABLET, FILM COATED ORAL 2 TIMES DAILY
Qty: 180 TABLET | Refills: 3 | OUTPATIENT
Start: 2021-02-22

## 2021-02-22 RX ORDER — METOPROLOL TARTRATE 50 MG/1
50 TABLET, FILM COATED ORAL 2 TIMES DAILY
Qty: 180 TABLET | Refills: 3 | Status: SHIPPED | OUTPATIENT
Start: 2021-02-22 | End: 2021-11-29 | Stop reason: SDUPTHER

## 2021-05-27 ENCOUNTER — OFFICE VISIT (OUTPATIENT)
Dept: CARDIOLOGY CLINIC | Age: 71
End: 2021-05-27
Payer: MEDICARE

## 2021-05-27 VITALS
BODY MASS INDEX: 31.02 KG/M2 | WEIGHT: 229 LBS | DIASTOLIC BLOOD PRESSURE: 82 MMHG | HEIGHT: 72 IN | HEART RATE: 80 BPM | SYSTOLIC BLOOD PRESSURE: 136 MMHG

## 2021-05-27 DIAGNOSIS — I25.10 CORONARY ARTERY DISEASE INVOLVING NATIVE CORONARY ARTERY OF NATIVE HEART WITHOUT ANGINA PECTORIS: Primary | Chronic | ICD-10-CM

## 2021-05-27 DIAGNOSIS — E78.2 MIXED HYPERLIPIDEMIA: ICD-10-CM

## 2021-05-27 DIAGNOSIS — I10 ESSENTIAL HYPERTENSION: ICD-10-CM

## 2021-05-27 PROCEDURE — G8427 DOCREV CUR MEDS BY ELIG CLIN: HCPCS | Performed by: INTERNAL MEDICINE

## 2021-05-27 PROCEDURE — 4040F PNEUMOC VAC/ADMIN/RCVD: CPT | Performed by: INTERNAL MEDICINE

## 2021-05-27 PROCEDURE — G8417 CALC BMI ABV UP PARAM F/U: HCPCS | Performed by: INTERNAL MEDICINE

## 2021-05-27 PROCEDURE — 2022F DILAT RTA XM EVC RTNOPTHY: CPT | Performed by: INTERNAL MEDICINE

## 2021-05-27 PROCEDURE — 1123F ACP DISCUSS/DSCN MKR DOCD: CPT | Performed by: INTERNAL MEDICINE

## 2021-05-27 PROCEDURE — 3017F COLORECTAL CA SCREEN DOC REV: CPT | Performed by: INTERNAL MEDICINE

## 2021-05-27 PROCEDURE — 3046F HEMOGLOBIN A1C LEVEL >9.0%: CPT | Performed by: INTERNAL MEDICINE

## 2021-05-27 PROCEDURE — 1036F TOBACCO NON-USER: CPT | Performed by: INTERNAL MEDICINE

## 2021-05-27 PROCEDURE — 99213 OFFICE O/P EST LOW 20 MIN: CPT | Performed by: INTERNAL MEDICINE

## 2021-05-27 RX ORDER — LOSARTAN POTASSIUM 25 MG/1
25 TABLET ORAL DAILY
COMMUNITY
End: 2021-12-03

## 2021-05-27 NOTE — PATIENT INSTRUCTIONS
Please be informed that if you contact our office outside of normal business hours the physician on call cannot help with any scheduling or rescheduling issues, procedure instruction questions or any type of medication issue. We advise you for any urgent/emergency that you go to the nearest emergency room! PLEASE CALL OUR OFFICE DURING NORMAL BUSINESS HOURS    Monday - Friday   8 am to 5 pm    [address]: [phone]    [place]:  [phone]  **It is YOUR responsibilty to bring medication bottles and/or updated medication list to [address] [place] [address]. This will allow us to better serve you and all your healthcare needs**    CAD:Yes   clinically stable. Patient is on optimal medical regimen ( see medication list above )  - Patient is currently  asymptomatic from CAD.          - changes in  treatment:   no           - Testing ordered:  no  [place] classification: 1  [place] RISK SCORE:  SAAD RISK SCORE:  HTN:well controlled on current medical regimen, see list above.              - changes in  treatment:   no   CARDIOMYOPATHY: None known   CONGESTIVE HEART FAILURE: NO KNOWN HISTORY.      VHD: No significant VHD noted  DYSLIPIDEMIA: Patient's profile is at / near [facility] [employer] current medical regimen wellyes,                                                               See most recent Lab values in Labs section above.   OTHER RELEVANT DIAGNOSIS:as noted in patient's active problem list:  TESTS ORDERED: None this visit                                              All previously ordered tests reviewed.   ARRHYTHMIAS: None known   MEDICATIONS: CPM. Patient is prescribed 25 mg of Losartan as he is Diabetic. It will be good to take it.   Office f/u in six months.

## 2021-05-27 NOTE — PROGRESS NOTES
heartbeat, murmur, palpitations or shortness of breath  Musculoskeletal: Negative for muscle pain, muscular weakness, negative for pain in arm and leg or swelling in foot and leg    Objective:  /82   Pulse 80   Ht 6' (1.829 m)   Wt 229 lb (103.9 kg)   BMI 31.06 kg/m²   Wt Readings from Last 3 Encounters:   05/27/21 229 lb (103.9 kg)   01/05/21 214 lb 4.8 oz (97.2 kg)   11/27/20 214 lb 9.6 oz (97.3 kg)     Body mass index is 31.06 kg/m². GENERAL - Alert, oriented, pleasant, in no apparent distress. EYES: No jaundice, no conjunctival pallor. Neck - Supple. No jugular venous distention noted. No carotid bruits. Cardiovascular - Normal S1 and S2 without obvious murmur or gallop. Extremities - No cyanosis, clubbing, or significant edema. Pulmonary - No respiratory distress. No wheezes or rales.       Lab Review   No results found for: TROPONINT  Lab Results   Component Value Date    BNP 5 02/09/2011     Lab Results   Component Value Date    INR 1.06 02/09/2011     No results found for: LABA1C  Lab Results   Component Value Date    WBC 8.5 05/26/2017    WBC 8.5 06/14/2016    HCT 46.5 05/26/2017    HCT 46.4 06/14/2016    MCV 88.1 05/26/2017    MCV 86.6 06/14/2016     05/26/2017     06/14/2016     Lab Results   Component Value Date    CHOL 135 08/16/2018    CHOL 125 10/19/2017    TRIG 102 08/16/2018    TRIG 60 10/19/2017    HDL 43 08/16/2018    HDL 45 10/19/2017    LDLDIRECT 93 08/16/2018    LDLDIRECT 75 10/19/2017     Lab Results   Component Value Date    ALT 30 08/16/2018    ALT 26 10/19/2017    AST 22 08/16/2018    AST 20 10/19/2017     BMP:    Lab Results   Component Value Date     10/19/2017     05/26/2017    K 4.5 10/19/2017    K 5.1 05/26/2017     10/19/2017     05/26/2017    CO2 26 10/19/2017    CO2 26 05/26/2017    BUN 12 10/19/2017    BUN 8 05/26/2017    CREATININE 0.9 10/19/2017    CREATININE 0.9 05/26/2017     CMP:   Lab Results   Component Value Date gregg,                                                               See most recent Lab values in Labs section above.  Dax Martinez DIAGNOSIS:as noted in patient's active problem list:  TESTS ORDERED: None this visit                                              All previously ordered tests reviewed.   ARRHYTHMIAS: None known   MEDICATIONS: CPM. Patient is prescribed 25 mg of Losartan as he is Diabetic. It will be good to take it.   Office f/u in six months.

## 2021-05-27 NOTE — LETTER
[facility] [address]  [address] [address]  Phone: [phone]  Fax: [phone]    Yazmin Kim MD    May 27, 2021     Titi Caldera  [address] [address]    Patient: Dani Vo   MR Number: [id number]   YOB: 1950   Date of Visit: 5/27/2021       Dear Titi Caldera:    Thank you for referring Dayton Cuello to me for evaluation/treatment. Below are the relevant portions of my assessment and plan of care. If you have questions, please do not hesitate to call me. I look forward to following [facility] Lilly along with you.     Sincerely,        Yazmin Kim MD

## 2021-07-22 ENCOUNTER — TELEPHONE (OUTPATIENT)
Dept: CARDIOLOGY CLINIC | Age: 71
End: 2021-07-22

## 2021-07-22 NOTE — TELEPHONE ENCOUNTER
Per Dr Patton Boy is Ok to take, vitamin D would need monitoring.  Patient advised and voices understanding

## 2021-11-29 RX ORDER — METOPROLOL TARTRATE 50 MG/1
50 TABLET, FILM COATED ORAL 2 TIMES DAILY
Qty: 180 TABLET | Refills: 3 | Status: SHIPPED | OUTPATIENT
Start: 2021-11-29 | End: 2022-02-07

## 2021-11-30 RX ORDER — PRAVASTATIN SODIUM 40 MG
40 TABLET ORAL DAILY
Qty: 30 TABLET | Refills: 5 | Status: SHIPPED | OUTPATIENT
Start: 2021-11-30

## 2021-12-03 ENCOUNTER — OFFICE VISIT (OUTPATIENT)
Dept: CARDIOLOGY CLINIC | Age: 71
End: 2021-12-03
Payer: MEDICARE

## 2021-12-03 VITALS
SYSTOLIC BLOOD PRESSURE: 130 MMHG | HEART RATE: 76 BPM | HEIGHT: 72 IN | WEIGHT: 222 LBS | DIASTOLIC BLOOD PRESSURE: 80 MMHG | BODY MASS INDEX: 30.07 KG/M2

## 2021-12-03 DIAGNOSIS — E66.09 CLASS 1 OBESITY DUE TO EXCESS CALORIES WITH SERIOUS COMORBIDITY AND BODY MASS INDEX (BMI) OF 33.0 TO 33.9 IN ADULT: ICD-10-CM

## 2021-12-03 DIAGNOSIS — E78.2 MIXED HYPERLIPIDEMIA: ICD-10-CM

## 2021-12-03 DIAGNOSIS — I10 ESSENTIAL HYPERTENSION: ICD-10-CM

## 2021-12-03 DIAGNOSIS — I25.10 CORONARY ARTERY DISEASE INVOLVING NATIVE CORONARY ARTERY OF NATIVE HEART WITHOUT ANGINA PECTORIS: Primary | Chronic | ICD-10-CM

## 2021-12-03 DIAGNOSIS — I25.10 2-VESSEL CORONARY ARTERY DISEASE: ICD-10-CM

## 2021-12-03 PROCEDURE — 3046F HEMOGLOBIN A1C LEVEL >9.0%: CPT | Performed by: INTERNAL MEDICINE

## 2021-12-03 PROCEDURE — G8484 FLU IMMUNIZE NO ADMIN: HCPCS | Performed by: INTERNAL MEDICINE

## 2021-12-03 PROCEDURE — G8417 CALC BMI ABV UP PARAM F/U: HCPCS | Performed by: INTERNAL MEDICINE

## 2021-12-03 PROCEDURE — 99213 OFFICE O/P EST LOW 20 MIN: CPT | Performed by: INTERNAL MEDICINE

## 2021-12-03 PROCEDURE — G8427 DOCREV CUR MEDS BY ELIG CLIN: HCPCS | Performed by: INTERNAL MEDICINE

## 2021-12-03 PROCEDURE — 3017F COLORECTAL CA SCREEN DOC REV: CPT | Performed by: INTERNAL MEDICINE

## 2021-12-03 PROCEDURE — 2022F DILAT RTA XM EVC RTNOPTHY: CPT | Performed by: INTERNAL MEDICINE

## 2021-12-03 PROCEDURE — 4040F PNEUMOC VAC/ADMIN/RCVD: CPT | Performed by: INTERNAL MEDICINE

## 2021-12-03 PROCEDURE — 1123F ACP DISCUSS/DSCN MKR DOCD: CPT | Performed by: INTERNAL MEDICINE

## 2021-12-03 PROCEDURE — 1036F TOBACCO NON-USER: CPT | Performed by: INTERNAL MEDICINE

## 2021-12-03 RX ORDER — TRAMADOL HYDROCHLORIDE 50 MG/1
TABLET ORAL
COMMUNITY
Start: 2021-09-03

## 2021-12-03 NOTE — LETTER
[facility] [address]  [address] [address]  Phone: [phone]  Fax: [phone]    Love Vences MD    December 3, 2021     Rima River, [address] [place] [address]    Patient: Sujatha Stearns   MR Number: [id number]   YOB: 1950   Date of Visit: 12/3/2021       Dear Rima River:    Thank you for referring Demetrice Adams to me for evaluation/treatment. Below are the relevant portions of my assessment and plan of care. If you have questions, please do not hesitate to call me. I look forward to following [facility] along with you.     Sincerely,      Love Vences MD

## 2021-12-03 NOTE — PROGRESS NOTES
Jessenia Nunez is a 80 y.o. male who has    CHIEF COMPLAINT AS FOLLOWS:  CHEST PAIN: Patient denies any C/O chest pains at this time.      SOB: No C/O SOB at this time.               LEG EDEMA: No edema.   PALPITATIONS: Denies any C/O Palpitations                                   DIZZINESS: No C/O Dizziness                         SYNCOPE: None   OTHER:                                     HPI: Patient is here for F/U on his CAD, HTN & Dyslipidemia problems. CAD: Patient has known CAD. HTN: Patient has known essential HTN. Has been treated with guideline recommended medical / physical/ diet therapy as stated below. Dyslipidemia: Patient has known mixed dyslipidemia. Has been treated with guideline recommended medical / physical/ diet therapy as stated below. Current Outpatient Medications   Medication Sig Dispense Refill    traMADol (ULTRAM) 50 MG tablet TAKE 1 TABLET BY MOUTH EVERY 4-6 HOURS AS NEEDED FOR PAIN      pravastatin (PRAVACHOL) 40 MG tablet Take 1 tablet by mouth daily 30 tablet 5    metoprolol tartrate (LOPRESSOR) 50 MG tablet Take 1 tablet by mouth 2 times daily 180 tablet 3    Acetaminophen (TYLENOL 8 HOUR PO) Take by mouth      metFORMIN (GLUCOPHAGE) 500 MG tablet Take 500 mg by mouth daily (with breakfast)      aspirin 81 MG tablet Take 81 mg by mouth daily.  Travoprost, BAK Free, (TRAVATAN Z) 0.004 % SOLN ophthalmic solution Place 1 drop into both eyes nightly. No current facility-administered medications for this visit.      Allergies: Crestor [rosuvastatin calcium], Lipitor [atorvastatin], and Celebrex [celecoxib]  Review of Systems:    Constitutional: Negative for diaphoresis and fatigue  Respiratory: Negative for shortness of breath  Cardiovascular: Negative for chest pain, dyspnea on exertion, claudication, edema, irregular heartbeat, murmur, palpitations or shortness of breath  Musculoskeletal: Negative for muscle pain, muscular weakness, negative for pain in arm and leg or swelling in foot and leg    Objective:  /80   Pulse 76   Ht 6' (1.829 m)   Wt 222 lb (100.7 kg)   BMI 30.11 kg/m²   Wt Readings from Last 3 Encounters:   12/03/21 222 lb (100.7 kg)   05/27/21 229 lb (103.9 kg)   01/05/21 214 lb 4.8 oz (97.2 kg)     Body mass index is 30.11 kg/m². GENERAL - Alert, oriented, pleasant, in no apparent distress. EYES: No jaundice, no conjunctival pallor. Neck - Supple. No jugular venous distention noted. No carotid bruits. Cardiovascular - Normal S1 and S2 without obvious murmur or gallop. Extremities - No cyanosis, clubbing, or significant edema. Pulmonary - No respiratory distress. No wheezes or rales.       MEDICAL DECISION MAKING & DATA REVIEW:    Lab Review   No results found for: TROPONINT  Lab Results   Component Value Date    BNP 5 02/09/2011     Lab Results   Component Value Date    INR 1.06 02/09/2011     No results found for: LABA1C  Lab Results   Component Value Date    WBC 8.5 05/26/2017    WBC 8.5 06/14/2016    HCT 46.5 05/26/2017    HCT 46.4 06/14/2016    MCV 88.1 05/26/2017    MCV 86.6 06/14/2016     05/26/2017     06/14/2016     Lab Results   Component Value Date    CHOL 135 08/16/2018    CHOL 125 10/19/2017    TRIG 102 08/16/2018    TRIG 60 10/19/2017    HDL 43 08/16/2018    HDL 45 10/19/2017    LDLDIRECT 93 08/16/2018    LDLDIRECT 75 10/19/2017     Lab Results   Component Value Date    ALT 30 08/16/2018    ALT 26 10/19/2017    AST 22 08/16/2018    AST 20 10/19/2017     BMP:    Lab Results   Component Value Date     10/19/2017     05/26/2017    K 4.5 10/19/2017    K 5.1 05/26/2017     10/19/2017     05/26/2017    CO2 26 10/19/2017    CO2 26 05/26/2017    BUN 12 10/19/2017    BUN 8 05/26/2017    CREATININE 0.9 10/19/2017    CREATININE 0.9 05/26/2017     CMP:   Lab Results   Component Value Date     10/19/2017     05/26/2017    K 4.5 10/19/2017    K 5.1 05/26/2017     10/19/2017     05/26/2017    CO2 26 10/19/2017    CO2 26 05/26/2017    BUN 12 10/19/2017    BUN 8 05/26/2017    CREATININE 0.9 10/19/2017    CREATININE 0.9 05/26/2017    PROT 6.7 08/16/2018    PROT 6.9 10/19/2017    PROT 6.6 07/09/2011     Lab Results   Component Value Date    TSH 2.540 05/26/2017    TSHHS 2.760 06/14/2016        CARDIOLITE 1/2020  Normal Stress nuclear scintigraphic study suggestive of normal myocardial    perfusion.    Gated images demonstrate normal left ventricular systolic function with EF    of 60 %.      Last Echo 11/2016    QUALITY MEASURES REVIEWED:  1.CAD:Patient is taking anti platelet agent:Yes  2. DYSLIPIDEMIA: Patient is on cholesterol lowering medication:Yes   3. Beta-Blocker therapy for CAD, if prior Myocardial Infarction:Yes   4. Counselled regarding smoking cessation. No   Patient does not Smoke. 5.Anticoagulation therapy (for A.Fib) No   Does Not have A.Fib.  6.Discussed weight management strategies. Assessment & Plan:  Primary / Secondary prevention is the goal by aggressive risk modification, healthy and therapeutic life style changes for cardiovascular risk reduction. CORONARY ARTERY DISEASE:Yes   clinically stable. Patient is on optimal medical regimen ( see medication list above )  - Patient is currently  asymptomatic from CAD.             - changes in  treatment:   no, on Metoprolol & ASA           - Testing ordered:  no  [place] classification: 1  [place] RISK SCORE:  SAAD RISK SCORE:  HTN:well controlled on current medical regimen, see list above.              - changes in  treatment:   no, on Metoprolol   CARDIOMYOPATHY: None known   CONGESTIVE HEART FAILURE: NO KNOWN HISTORY.      VHD: No significant VHD noted  DYSLIPIDEMIA: Patient's profile is at / near [employer] current medical regimen wellyes,  Takes Pravachol                                                             See most recent Lab values in Labs section above. TESTS ORDERED:     PREVIOUSLY ORDERED TESTS REVIEWED & DISCUSSED WITH THE PATIENT:     I personally reviewed & interpreted, all previously ordered tests as copied above. Latest Labs are pulled in to the note with dates. Labs, specially in Reference to Lipid profile, Cardiac testing in the form of Echo, stress tests & other relevant cardiac testing reviewed with patient & recommendations made based on assessment of the results. MEDICATIONS: List of medications patient is currently taking is reviewed in detail with the patient. Discussed any side effects or problems taking the medication. Recommend Continue present management & medications as listed. AFFIRMATION: I spent at least 20 minutes of time reviewing patient's history, previous & current medical problems & all Labs + testing. This includes chart prep even prior to the vosit. Various goals are discussed and multiple questions answered. Relevant concelling performed. Office follow up in six months.

## 2021-12-03 NOTE — PATIENT INSTRUCTIONS
CORONARY ARTERY DISEASE:Yes   clinically stable. Patient is on optimal medical regimen ( see medication list above )  - Patient is currently  asymptomatic from CAD.          - changes in  treatment:   no, on Metoprolol & ASA           - Testing ordered:  no  [place] classification: 1  [place] RISK SCORE:  SAAD RISK SCORE:  HTN:well controlled on current medical regimen, see list above.              - changes in  treatment:   no, on Metoprolol   CARDIOMYOPATHY: None known   CONGESTIVE HEART FAILURE: NO KNOWN HISTORY.      VHD: No significant VHD noted  DYSLIPIDEMIA: Patient's profile is at / near [facility] [employer] current medical regimen wellyes,  Takes Pravachol                                                             See most recent Lab values in Labs section above. TESTS ORDERED:     PREVIOUSLY ORDERED TESTS REVIEWED & DISCUSSED WITH THE PATIENT:     I personally reviewed & interpreted, all previously ordered tests as copied above. Latest Labs are pulled in to the note with dates. Labs, specially in Reference to Lipid profile, Cardiac testing in the form of Echo, stress tests & other relevant cardiac testing reviewed with patient & recommendations made based on assessment of the results. MEDICATIONS: List of medications patient is currently taking is reviewed in detail with the patient. Discussed any side effects or problems taking the medication. Recommend Continue present management & medications as listed. AFFIRMATION: I spent at least 20 minutes of time reviewing patient's history, previous & current medical problems & all Labs + testing. This includes chart prep even prior to the vosit. Various goals are discussed and multiple questions answered. Relevant concelling performed. Office follow up in six months.

## 2022-02-07 RX ORDER — METOPROLOL TARTRATE 50 MG/1
TABLET, FILM COATED ORAL
Qty: 180 TABLET | Refills: 3 | Status: SHIPPED | OUTPATIENT
Start: 2022-02-07

## 2022-04-28 ENCOUNTER — TELEPHONE (OUTPATIENT)
Dept: CARDIOLOGY CLINIC | Age: 72
End: 2022-04-28

## 2022-04-28 NOTE — TELEPHONE ENCOUNTER
Discussed with Dr Singh Mistry, meloxicam is OK for short term use. Patient advised and voices understanding.

## 2022-04-28 NOTE — TELEPHONE ENCOUNTER
Patient called stating that he just saw his ortho doctor and he wants to know if it is okay with [facility] for him to take melaxican. Please call him back at ph #[phone].

## 2022-05-10 ENCOUNTER — TELEPHONE (OUTPATIENT)
Dept: CARDIOLOGY CLINIC | Age: 72
End: 2022-05-10

## 2022-06-09 ENCOUNTER — OFFICE VISIT (OUTPATIENT)
Dept: CARDIOLOGY CLINIC | Age: 72
End: 2022-06-09
Payer: MEDICARE

## 2022-06-09 VITALS
HEIGHT: 72 IN | WEIGHT: 214.4 LBS | DIASTOLIC BLOOD PRESSURE: 86 MMHG | SYSTOLIC BLOOD PRESSURE: 130 MMHG | HEART RATE: 76 BPM | BODY MASS INDEX: 29.04 KG/M2

## 2022-06-09 DIAGNOSIS — R00.2 PALPITATION: ICD-10-CM

## 2022-06-09 DIAGNOSIS — I25.10 CORONARY ARTERY DISEASE INVOLVING NATIVE CORONARY ARTERY OF NATIVE HEART WITHOUT ANGINA PECTORIS: Primary | Chronic | ICD-10-CM

## 2022-06-09 DIAGNOSIS — I10 ESSENTIAL HYPERTENSION: ICD-10-CM

## 2022-06-09 DIAGNOSIS — E78.2 MIXED HYPERLIPIDEMIA: ICD-10-CM

## 2022-06-09 PROCEDURE — 99213 OFFICE O/P EST LOW 20 MIN: CPT | Performed by: INTERNAL MEDICINE

## 2022-06-09 PROCEDURE — G8417 CALC BMI ABV UP PARAM F/U: HCPCS | Performed by: INTERNAL MEDICINE

## 2022-06-09 PROCEDURE — 3017F COLORECTAL CA SCREEN DOC REV: CPT | Performed by: INTERNAL MEDICINE

## 2022-06-09 PROCEDURE — 93000 ELECTROCARDIOGRAM COMPLETE: CPT | Performed by: INTERNAL MEDICINE

## 2022-06-09 PROCEDURE — 3046F HEMOGLOBIN A1C LEVEL >9.0%: CPT | Performed by: INTERNAL MEDICINE

## 2022-06-09 PROCEDURE — 2022F DILAT RTA XM EVC RTNOPTHY: CPT | Performed by: INTERNAL MEDICINE

## 2022-06-09 PROCEDURE — 1123F ACP DISCUSS/DSCN MKR DOCD: CPT | Performed by: INTERNAL MEDICINE

## 2022-06-09 PROCEDURE — G8427 DOCREV CUR MEDS BY ELIG CLIN: HCPCS | Performed by: INTERNAL MEDICINE

## 2022-06-09 PROCEDURE — 1036F TOBACCO NON-USER: CPT | Performed by: INTERNAL MEDICINE

## 2022-06-09 NOTE — PROGRESS NOTES
Audra Elliott is a 80 y.o. male who has    CHIEF COMPLAINT AS FOLLOWS:  CHEST PAIN: Patient denies any C/O chest pains at this time.      SOB: No C/O SOB at this time.               LEG EDEMA: No edema.   PALPITATIONS: Denies any C/O Palpitations                                   DIZZINESS: No C/O Dizziness           SYNCOPE: None   OTHER/ ADDITIONAL COMPLAINTS:                                     HPI: Patient is here for F/U on his CAD, HTN & Dyslipidemia problems. CAD: Patient has known CAD. HTN: Patient has known essential HTN. Has been treated with guideline recommended medical / physical/ diet therapy as stated below. Dyslipidemia: Patient has known mixed dyslipidemia. Has been treated with guideline recommended medical / physical/ diet therapy as stated below. Current Outpatient Medications   Medication Sig Dispense Refill    metoprolol tartrate (LOPRESSOR) 50 MG tablet TAKE 1 TABLET BY MOUTH TWICE DAILY 180 tablet 3    traMADol (ULTRAM) 50 MG tablet TAKE 1 TABLET BY MOUTH EVERY 4-6 HOURS AS NEEDED FOR PAIN      pravastatin (PRAVACHOL) 40 MG tablet Take 1 tablet by mouth daily 30 tablet 5    Acetaminophen (TYLENOL 8 HOUR PO) Take by mouth      metFORMIN (GLUCOPHAGE) 500 MG tablet Take 500 mg by mouth daily (with breakfast)      aspirin 81 MG tablet Take 81 mg by mouth daily.  Travoprost, BAK Free, (TRAVATAN Z) 0.004 % SOLN ophthalmic solution Place 1 drop into both eyes nightly. No current facility-administered medications for this visit.      Allergies: Crestor [rosuvastatin calcium], Lipitor [atorvastatin], and Celebrex [celecoxib]  Review of Systems:    Constitutional: Negative for diaphoresis and fatigue  Respiratory: Negative for shortness of breath  Cardiovascular: Negative for chest pain, dyspnea on exertion, claudication, edema, irregular heartbeat, murmur, palpitations or shortness of breath  Musculoskeletal: Negative for muscle pain, muscular weakness, negative for pain in arm and leg or swelling in foot and leg    Objective:  /86   Pulse 76   Ht 6' (1.829 m)   Wt 214 lb 6.4 oz (97.3 kg)   BMI 29.08 kg/m²   Wt Readings from Last 3 Encounters:   06/09/22 214 lb 6.4 oz (97.3 kg)   12/03/21 222 lb (100.7 kg)   05/27/21 229 lb (103.9 kg)     Body mass index is 29.08 kg/m². GENERAL - Alert, oriented, pleasant, in no apparent distress. EYES: No jaundice, no conjunctival pallor. Neck - Supple. No jugular venous distention noted. No carotid bruits. Cardiovascular - Normal S1 and S2 without obvious murmur or gallop. Extremities - No cyanosis, clubbing, or significant edema. Pulmonary - No respiratory distress. No wheezes or rales.       MEDICAL DECISION MAKING & DATA REVIEW:    Lab Review   No results found for: TROPONINT  Lab Results   Component Value Date    BNP 5 02/09/2011     Lab Results   Component Value Date    INR 1.06 02/09/2011     No results found for: LABA1C  Lab Results   Component Value Date    WBC 8.5 05/26/2017    WBC 8.5 06/14/2016    HCT 46.5 05/26/2017    HCT 46.4 06/14/2016    MCV 88.1 05/26/2017    MCV 86.6 06/14/2016     05/26/2017     06/14/2016     Lab Results   Component Value Date    CHOL 135 08/16/2018    CHOL 125 10/19/2017    TRIG 102 08/16/2018    TRIG 60 10/19/2017    HDL 43 08/16/2018    HDL 45 10/19/2017    LDLDIRECT 93 08/16/2018    LDLDIRECT 75 10/19/2017     Lab Results   Component Value Date    ALT 30 08/16/2018    ALT 26 10/19/2017    AST 22 08/16/2018    AST 20 10/19/2017     BMP:    Lab Results   Component Value Date     10/19/2017     05/26/2017    K 4.5 10/19/2017    K 5.1 05/26/2017     10/19/2017     05/26/2017    CO2 26 10/19/2017    CO2 26 05/26/2017    BUN 12 10/19/2017    BUN 8 05/26/2017    CREATININE 0.9 10/19/2017    CREATININE 0.9 05/26/2017     CMP:   Lab Results   Component Value Date     10/19/2017     05/26/2017    K 4.5 10/19/2017    K 5.1 05/26/2017     10/19/2017     05/26/2017    CO2 26 10/19/2017    CO2 26 05/26/2017    BUN 12 10/19/2017    BUN 8 05/26/2017    CREATININE 0.9 10/19/2017    CREATININE 0.9 05/26/2017    PROT 6.7 08/16/2018    PROT 6.9 10/19/2017    PROT 6.6 07/09/2011     Lab Results   Component Value Date    [facility] 2.540 05/26/2017    [facility] 2.760 06/14/2016       QUALITY MEASURES REVIEWED:  1.CAD:Patient is taking anti platelet agent:Yes  Patient does not have Hx of documented CAD  2. DYSLIPIDEMIA: Patient is on cholesterol lowering medication:Yes   3. Beta-Blocker therapy for CAD, if prior Myocardial Infarction:Yes   4. Counselled regarding smoking cessation. No   Patient does not Smoke. 5.Anticoagulation therapy (for A.Fib) No   Does Not have A.Fib.  6.Discussed weight management strategies. Assessment & Plan:  Primary / Secondary prevention is the goal by aggressive risk modification, healthy and therapeutic life style changes for cardiovascular risk reduction. CORONARY ARTERY DISEASE:Yes   clinically stable. Patient is on optimal medical regimen ( see medication list above )  - Patient is currently  asymptomatic from CAD.          - changes in  treatment:   no, on Metoprolol & ASA           - Testing ordered:  no  [facility] classification: 1     CARDIOLITE 1/2020  Normal Stress nuclear scintigraphic study suggestive of normal myocardial    perfusion.    Gated images demonstrate normal left ventricular systolic function with EF    of 60 %.      EKG: NSR, Low voltage.     HTN:well controlled on current medical regimen, see list above.              - changes in  treatment:   no, on Metoprolol   CARDIOMYOPATHY: None known   CONGESTIVE HEART FAILURE: NO KNOWN HISTORY.      VHD: No significant VHD noted           Last Echo 2016    DYSLIPIDEMIA: Patient's profile is at / near [facility] current medical regimen wellyes,  Takes Pravachol                                                             See most recent Lab values in Labs section above.     TESTS ORDERED: Echo     PREVIOUSLY ORDERED TESTS REVIEWED & DISCUSSED WITH THE PATIENT:     I personally reviewed & interpreted, all previously ordered tests as copied above. Latest Labs are pulled in to the note with dates. Labs, specially in Reference to Lipid profile, Cardiac testing in the form of Echo ( dated: ), stress tests ( dated: ) & other relevant cardiac testing reviewed with patient & recommendations made based on assessment of the results. Discussed role of Cardiac risk factors & effects + treatment of co morbidities with patient & advised accordingly. MEDICATIONS: List of medications patient is currently taking is reviewed in detail with the patient & family member present. Discussed any side effects or problems taking the medication. Recommend Continue present management & medications as listed. AFFIRMATION: I spent at least 20 minutes of time reviewing patient's history, previous & current medical problems & all Labs + testing. This includes chart prep even prior to the vosit. Various goals are discussed and multiple questions answered. Relevant concelling performed. Office follow up in six months.

## 2022-06-09 NOTE — LETTER
[facility] [address]  [address] [address]  Phone: [phone]  Fax: [phone]    Pascale Soriano MD    June 9, 2022     Irena Conway, [facility] [address] [address]    Patient: Brii Mello   MR Number: [id number]   YOB: 1950   Date of Visit: 6/9/2022       Dear Irena Conway:    Thank you for referring Josemanuel Cade to me for evaluation/treatment. Below are the relevant portions of my assessment and plan of care. If you have questions, please do not hesitate to call me. I look forward to following [address] [place] [address] along with you.     Sincerely,      Pascale Soriano MD

## 2022-06-09 NOTE — PATIENT INSTRUCTIONS
CORONARY ARTERY DISEASE:Yes   clinically stable. Patient is on optimal medical regimen ( see medication list above )  - Patient is currently  asymptomatic from CAD.          - changes in  treatment:   no, on Metoprolol & ASA           - Testing ordered:  no  [facility] classification: 1     CARDIOLITE 1/2020  Normal Stress nuclear scintigraphic study suggestive of normal myocardial    perfusion.    Gated images demonstrate normal left ventricular systolic function with EF    of 60 %.      EKG: NSR, Low voltage. HTN:well controlled on current medical regimen, see list above.              - changes in  treatment:   no, on Metoprolol   CARDIOMYOPATHY: None known   CONGESTIVE HEART FAILURE: NO KNOWN HISTORY.      VHD: No significant VHD noted           Last Echo 2016    DYSLIPIDEMIA: Patient's profile is at / near [facility] current medical regimen wellyes,  Takes Pravachol                                                             See most recent Lab values in Labs section above.     TESTS ORDERED: Echo     PREVIOUSLY ORDERED TESTS REVIEWED & DISCUSSED WITH THE PATIENT:     I personally reviewed & interpreted, all previously ordered tests as copied above. Latest Labs are pulled in to the note with dates. Labs, specially in Reference to Lipid profile, Cardiac testing in the form of Echo ( dated: ), stress tests ( dated: ) & other relevant cardiac testing reviewed with patient & recommendations made based on assessment of the results. Discussed role of Cardiac risk factors & effects + treatment of co morbidities with patient & advised accordingly. MEDICATIONS: List of medications patient is currently taking is reviewed in detail with the patient & family member present. Discussed any side effects or problems taking the medication. Recommend Continue present management & medications as listed.      AFFIRMATION: I spent at least 20 minutes of time reviewing patient's history, previous & current medical problems & all Labs + testing. This includes chart prep even prior to the vosit. Various goals are discussed and multiple questions answered. Relevant concelling performed. Office follow up in six months.

## 2022-06-24 ENCOUNTER — PROCEDURE VISIT (OUTPATIENT)
Dept: CARDIOLOGY CLINIC | Age: 72
End: 2022-06-24
Payer: MEDICARE

## 2022-06-24 DIAGNOSIS — R00.2 PALPITATION: ICD-10-CM

## 2022-06-24 DIAGNOSIS — E78.2 MIXED HYPERLIPIDEMIA: ICD-10-CM

## 2022-06-24 DIAGNOSIS — I25.10 CORONARY ARTERY DISEASE INVOLVING NATIVE CORONARY ARTERY OF NATIVE HEART WITHOUT ANGINA PECTORIS: Chronic | ICD-10-CM

## 2022-06-24 DIAGNOSIS — I10 ESSENTIAL HYPERTENSION: ICD-10-CM

## 2022-06-24 LAB
LV EF: 58 %
LVEF MODALITY: NORMAL

## 2022-06-24 PROCEDURE — 93306 TTE W/DOPPLER COMPLETE: CPT | Performed by: INTERNAL MEDICINE

## 2022-06-27 ENCOUNTER — TELEPHONE (OUTPATIENT)
Dept: CARDIOLOGY CLINIC | Age: 72
End: 2022-06-27

## 2022-06-27 NOTE — TELEPHONE ENCOUNTER
Echo:  Limited study due to patients body habitus. Left ventricular function and size is normal, EF is estimated at 55-60%. Mild left ventricular hypertrophy. Grade I diastolic dysfunction. No regional wall motion abnormalities were detected. Mildly dilated left atrium. No significant valvular regurgitation or disease noted. Dilation of the aortic root(3.8cm) and the ascending aorta(4.0cm).    No evidence of pericardial effusion    Patient verbally understood

## 2022-10-27 LAB
BILIRUBIN, POC: NEGATIVE
BLOOD URINE, POC: NEGATIVE
CLARITY, POC: CLEAR
COLOR, POC: YELLOW
GLUCOSE URINE, POC: NEGATIVE
KETONES, POC: NEGATIVE
LEUKOCYTE EST, POC: NEGATIVE
NITRITE, POC: NEGATIVE
PH, POC: 7
PROTEIN, POC: NEGATIVE
SPECIFIC GRAVITY, POC: 1.02
UROBILINOGEN, POC: NORMAL

## 2022-11-02 LAB
CONTROL: NORMAL
HEMOCCULT STL QL: NEGATIVE

## 2022-11-10 RX ORDER — PRAVASTATIN SODIUM 40 MG
40 TABLET ORAL DAILY
Qty: 30 TABLET | Refills: 5 | Status: SHIPPED | OUTPATIENT
Start: 2022-11-10

## 2022-12-12 ENCOUNTER — OFFICE VISIT (OUTPATIENT)
Dept: CARDIOLOGY CLINIC | Age: 72
End: 2022-12-12
Payer: MEDICARE

## 2022-12-12 VITALS
HEIGHT: 72 IN | HEART RATE: 84 BPM | DIASTOLIC BLOOD PRESSURE: 80 MMHG | WEIGHT: 222 LBS | BODY MASS INDEX: 30.07 KG/M2 | SYSTOLIC BLOOD PRESSURE: 136 MMHG

## 2022-12-12 DIAGNOSIS — I25.10 2-VESSEL CORONARY ARTERY DISEASE: Primary | ICD-10-CM

## 2022-12-12 DIAGNOSIS — I10 ESSENTIAL HYPERTENSION: ICD-10-CM

## 2022-12-12 DIAGNOSIS — E78.2 MIXED HYPERLIPIDEMIA: ICD-10-CM

## 2022-12-12 DIAGNOSIS — I25.10 CORONARY ARTERY DISEASE INVOLVING NATIVE CORONARY ARTERY OF NATIVE HEART WITHOUT ANGINA PECTORIS: Chronic | ICD-10-CM

## 2022-12-12 PROCEDURE — G8427 DOCREV CUR MEDS BY ELIG CLIN: HCPCS | Performed by: INTERNAL MEDICINE

## 2022-12-12 PROCEDURE — 1123F ACP DISCUSS/DSCN MKR DOCD: CPT | Performed by: INTERNAL MEDICINE

## 2022-12-12 PROCEDURE — G8484 FLU IMMUNIZE NO ADMIN: HCPCS | Performed by: INTERNAL MEDICINE

## 2022-12-12 PROCEDURE — 3074F SYST BP LT 130 MM HG: CPT | Performed by: INTERNAL MEDICINE

## 2022-12-12 PROCEDURE — 1036F TOBACCO NON-USER: CPT | Performed by: INTERNAL MEDICINE

## 2022-12-12 PROCEDURE — 3017F COLORECTAL CA SCREEN DOC REV: CPT | Performed by: INTERNAL MEDICINE

## 2022-12-12 PROCEDURE — G8417 CALC BMI ABV UP PARAM F/U: HCPCS | Performed by: INTERNAL MEDICINE

## 2022-12-12 PROCEDURE — 99213 OFFICE O/P EST LOW 20 MIN: CPT | Performed by: INTERNAL MEDICINE

## 2022-12-12 PROCEDURE — 3078F DIAST BP <80 MM HG: CPT | Performed by: INTERNAL MEDICINE

## 2022-12-12 NOTE — PROGRESS NOTES
Aníbal Snell is a 71 y.o. male who has    CHIEF COMPLAINT AS FOLLOWS:  CHEST PAIN:  Patient denies any C/O chest pains at this time. SOB: No C/O SOB at this time. LEG EDEMA: No edema. PALPITATIONS: Denies any C/O Palpitations                                   DIZZINESS: No C/O Dizziness           SYNCOPE: None   OTHER/ ADDITIONAL COMPLAINTS:                                     HPI: Patient is here for F/U on his CAD, HTN & Dyslipidemia problems. CAD: Patient has known CAD. HTN: Patient has known essential HTN. Has been treated with guideline recommended medical / physical/ diet therapy as stated below. Dyslipidemia: Patient has known mixed dyslipidemia. Has been treated with guideline recommended medical / physical/ diet therapy as stated below. Current Outpatient Medications   Medication Sig Dispense Refill    Zolpidem Tartrate (AMBIEN PO) Take by mouth      pravastatin (PRAVACHOL) 40 MG tablet Take 1 tablet by mouth daily 30 tablet 5    metoprolol tartrate (LOPRESSOR) 50 MG tablet TAKE 1 TABLET BY MOUTH TWICE DAILY 180 tablet 3    traMADol (ULTRAM) 50 MG tablet TAKE 1 TABLET BY MOUTH EVERY 4-6 HOURS AS NEEDED FOR PAIN      Acetaminophen (TYLENOL 8 HOUR PO) Take by mouth      metFORMIN (GLUCOPHAGE) 500 MG tablet Take 500 mg by mouth daily (with breakfast)      aspirin 81 MG tablet Take 81 mg by mouth daily. Travoprost, BAK Free, (TRAVATAN Z) 0.004 % SOLN ophthalmic solution Place 1 drop into both eyes nightly. No current facility-administered medications for this visit.      Allergies: Crestor [rosuvastatin calcium], Lipitor [atorvastatin], and Celebrex [celecoxib]  Review of Systems:    Constitutional: Negative for diaphoresis and fatigue  Respiratory: Negative for shortness of breath  Cardiovascular: Negative for chest pain, dyspnea on exertion, claudication, edema, irregular heartbeat, murmur, palpitations or shortness of breath  Musculoskeletal: Negative for muscle pain, muscular weakness, negative for pain in arm and leg or swelling in foot and leg    Objective:  /80   Pulse 84   Ht 6' (1.829 m)   Wt 222 lb (100.7 kg)   BMI 30.11 kg/m²   Wt Readings from Last 3 Encounters:   12/12/22 222 lb (100.7 kg)   06/09/22 214 lb 6.4 oz (97.3 kg)   12/03/21 222 lb (100.7 kg)     Body mass index is 30.11 kg/m². GENERAL - Alert, oriented, pleasant, in no apparent distress. EYES: No jaundice, no conjunctival pallor. Neck - Supple. No jugular venous distention noted. No carotid bruits. Cardiovascular - Normal S1 and S2 without obvious murmur or gallop. Extremities - No cyanosis, clubbing, or significant edema. Pulmonary - No respiratory distress. No wheezes or rales.       MEDICAL DECISION MAKING & DATA REVIEW:    Lab Review   No results found for: TROPONINT  Lab Results   Component Value Date/Time    BNP 5 02/09/2011 05:05 PM     Lab Results   Component Value Date    INR 1.06 02/09/2011     No results found for: LABA1C  Lab Results   Component Value Date    WBC 8.5 05/26/2017    WBC 8.5 06/14/2016    HCT 46.5 05/26/2017    HCT 46.4 06/14/2016    MCV 88.1 05/26/2017    MCV 86.6 06/14/2016     05/26/2017     06/14/2016     Lab Results   Component Value Date    CHOL 135 08/16/2018    CHOL 125 10/19/2017    TRIG 102 08/16/2018    TRIG 60 10/19/2017    HDL 43 08/16/2018    HDL 45 10/19/2017    LDLDIRECT 93 08/16/2018    LDLDIRECT 75 10/19/2017     Lab Results   Component Value Date    ALT 30 08/16/2018    ALT 26 10/19/2017    AST 22 08/16/2018    AST 20 10/19/2017     BMP:    Lab Results   Component Value Date/Time     10/19/2017 01:47 PM     05/26/2017 01:09 PM    K 4.5 10/19/2017 01:47 PM    K 5.1 05/26/2017 01:09 PM     10/19/2017 01:47 PM     05/26/2017 01:09 PM    CO2 26 10/19/2017 01:47 PM    CO2 26 05/26/2017 01:09 PM    BUN 12 10/19/2017 01:47 PM    BUN 8 05/26/2017 01:09 PM CREATININE 0.9 10/19/2017 01:47 PM    CREATININE 0.9 05/26/2017 01:09 PM     CMP:   Lab Results   Component Value Date/Time     10/19/2017 01:47 PM     05/26/2017 01:09 PM    K 4.5 10/19/2017 01:47 PM    K 5.1 05/26/2017 01:09 PM     10/19/2017 01:47 PM     05/26/2017 01:09 PM    CO2 26 10/19/2017 01:47 PM    CO2 26 05/26/2017 01:09 PM    BUN 12 10/19/2017 01:47 PM    BUN 8 05/26/2017 01:09 PM    CREATININE 0.9 10/19/2017 01:47 PM    CREATININE 0.9 05/26/2017 01:09 PM    PROT 6.7 08/16/2018 01:54 PM    PROT 6.9 10/19/2017 01:47 PM    PROT 6.6 07/09/2011 10:25 AM     Lab Results   Component Value Date/Time    [facility] 2.540 05/26/2017 01:09 PM    [facility] 2.760 06/14/2016 01:29 PM       QUALITY MEASURES REVIEWED:  1.CAD:Patient is taking anti platelet agent:Yes  2. DYSLIPIDEMIA: Patient is on cholesterol lowering medication:Yes   3. Beta-Blocker therapy for CAD, if prior Myocardial Infarction:Yes   4. Counselled regarding smoking cessation. No   Patient does not Smoke. 5.Anticoagulation therapy (for A.Fib) No   Does Not have A.Fib.  6.Discussed weight management strategies. Assessment & Plan:  Primary / Secondary prevention is the goal by aggressive risk modification, healthy and therapeutic life style changes for cardiovascular risk reduction. CORONARY ARTERY DISEASE:Yes   clinically stable. Patient is on optimal medical regimen ( see medication list above )  - Patient is currently  asymptomatic from CAD. - changes in  treatment:   no, on Metoprolol & ASA           - Testing ordered:  no  [facility] classification: 1     CARDIOLITE 1/2020  Normal Stress nuclear scintigraphic study suggestive of normal myocardial    perfusion. Gated images demonstrate normal left ventricular systolic function with EF    of 60 %. EKG: NSR, Low voltage. HTN:well controlled on current medical regimen, see list above.               - changes in  treatment:   no, on Metoprolol   CARDIOMYOPATHY: None known   CONGESTIVE HEART FAILURE: NO KNOWN HISTORY.      VHD: No significant VHD noted        Echo 6/24/2022    Limited study due to patients body habitus. Left ventricular function and size is normal, EF is estimated at 55-60%. Mild left ventricular hypertrophy. Grade I diastolic dysfunction. No regional wall motion abnormalities were detected. Mildly dilated left atrium. No significant valvular regurgitation or disease noted. Dilation of the aortic root(3.8cm) and the ascending aorta(4.0cm). No evidence of pericardial effusion. DYSLIPIDEMIA: Patient's profile is at / near [address] current medical regimen wellyes,  Takes Pravachol                                                             See most recent Lab values in Labs section above. TESTS ORDERED:none this visit     PREVIOUSLY ORDERED TESTS REVIEWED & DISCUSSED WITH THE PATIENT:     I personally reviewed & interpreted, all previously ordered tests as copied above. Latest Labs are pulled in to the note with dates. Labs, specially in Reference to Lipid profile, Cardiac testing in the form of Echo ( dated: ), stress tests ( dated: ) & other relevant cardiac testing reviewed with patient & recommendations made based on assessment of the results. Discussed role of Cardiac risk factors & effects + treatment of co morbidities with patient & advised accordingly. MEDICATIONS: List of medications patient is currently taking is reviewed in detail with the patient & family member present. Discussed any side effects or problems taking the medication. Recommend Continue present management & medications as listed. AFFIRMATION: I spent at least 20 minutes of time reviewing patient's history, previous & current medical problems & all Labs + testing. This includes chart prep even prior to the vosit.  Various goals are discussed and multiple questions answered. Relevant concelling performed. Office follow up in six months.

## 2022-12-12 NOTE — PATIENT INSTRUCTIONS
CORONARY ARTERY DISEASE:Yes   clinically stable. Patient is on optimal medical regimen ( see medication list above )  - Patient is currently  asymptomatic from CAD. - changes in  treatment:   no, on Metoprolol & ASA           - Testing ordered:  no  [facility]     CARDIOLITE 1/2020  Normal Stress nuclear scintigraphic study suggestive of normal myocardial    perfusion. Gated images demonstrate normal left ventricular systolic function with EF    of 60 %. EKG: NSR, Low voltage. HTN:well controlled on current medical regimen, see list above. - changes in  treatment:   no, on Metoprolol   CARDIOMYOPATHY: None known   CONGESTIVE HEART FAILURE: NO KNOWN HISTORY.      VHD: No significant VHD noted        Echo 6/24/2022    Limited study due to patients body habitus. Left ventricular function and size is normal, EF is estimated at 55-60%. Mild left ventricular hypertrophy. Grade I diastolic dysfunction. No regional wall motion abnormalities were detected. Mildly dilated left atrium. No significant valvular regurgitation or disease noted. Dilation of the aortic root(3.8cm) and the ascending aorta(4.0cm). No evidence of pericardial effusion. DYSLIPIDEMIA: Patient's profile is at / near [address] current medical regimen wellyes,  Takes Pravachol                                                             See most recent Lab values in Labs section above. TESTS ORDERED:none this visit     PREVIOUSLY ORDERED TESTS REVIEWED & DISCUSSED WITH THE PATIENT:     I personally reviewed & interpreted, all previously ordered tests as copied above. Latest Labs are pulled in to the note with dates.    Labs, specially in Reference to Lipid profile, Cardiac testing in the form of Echo ( dated: ), stress tests ( dated: ) & other relevant cardiac testing reviewed with patient & recommendations made based on assessment of the results. Discussed role of Cardiac risk factors & effects + treatment of co morbidities with patient & advised accordingly. MEDICATIONS: List of medications patient is currently taking is reviewed in detail with the patient & family member present. Discussed any side effects or problems taking the medication. Recommend Continue present management & medications as listed. AFFIRMATION: I spent at least 20 minutes of time reviewing patient's history, previous & current medical problems & all Labs + testing. This includes chart prep even prior to the vosit. Various goals are discussed and multiple questions answered. Relevant concelling performed. Office follow up in six months.

## 2023-01-25 RX ORDER — METOPROLOL TARTRATE 50 MG/1
50 TABLET, FILM COATED ORAL 2 TIMES DAILY
Qty: 180 TABLET | Refills: 3 | Status: SHIPPED | OUTPATIENT
Start: 2023-01-25

## 2023-02-22 ENCOUNTER — OFFICE VISIT (OUTPATIENT)
Dept: FAMILY MEDICINE CLINIC | Age: 73
End: 2023-02-22
Payer: MEDICARE

## 2023-02-22 VITALS
BODY MASS INDEX: 27.79 KG/M2 | HEIGHT: 72 IN | WEIGHT: 205.2 LBS | OXYGEN SATURATION: 96 % | SYSTOLIC BLOOD PRESSURE: 140 MMHG | DIASTOLIC BLOOD PRESSURE: 80 MMHG | HEART RATE: 86 BPM

## 2023-02-22 DIAGNOSIS — E11.9 TYPE 2 DIABETES MELLITUS WITHOUT COMPLICATION, WITHOUT LONG-TERM CURRENT USE OF INSULIN (HCC): ICD-10-CM

## 2023-02-22 DIAGNOSIS — R21 RASH: ICD-10-CM

## 2023-02-22 DIAGNOSIS — M62.559 ATROPHY OF QUADRICEPS FEMORIS MUSCLE: ICD-10-CM

## 2023-02-22 DIAGNOSIS — H93.8X2 SWELLING OF LEFT EAR: ICD-10-CM

## 2023-02-22 DIAGNOSIS — I10 PRIMARY HYPERTENSION: Primary | ICD-10-CM

## 2023-02-22 PROCEDURE — 1036F TOBACCO NON-USER: CPT | Performed by: FAMILY MEDICINE

## 2023-02-22 PROCEDURE — G8484 FLU IMMUNIZE NO ADMIN: HCPCS | Performed by: FAMILY MEDICINE

## 2023-02-22 PROCEDURE — 3078F DIAST BP <80 MM HG: CPT | Performed by: FAMILY MEDICINE

## 2023-02-22 PROCEDURE — 99203 OFFICE O/P NEW LOW 30 MIN: CPT | Performed by: FAMILY MEDICINE

## 2023-02-22 PROCEDURE — 3046F HEMOGLOBIN A1C LEVEL >9.0%: CPT | Performed by: FAMILY MEDICINE

## 2023-02-22 PROCEDURE — 3074F SYST BP LT 130 MM HG: CPT | Performed by: FAMILY MEDICINE

## 2023-02-22 PROCEDURE — 3017F COLORECTAL CA SCREEN DOC REV: CPT | Performed by: FAMILY MEDICINE

## 2023-02-22 PROCEDURE — 1123F ACP DISCUSS/DSCN MKR DOCD: CPT | Performed by: FAMILY MEDICINE

## 2023-02-22 PROCEDURE — 2022F DILAT RTA XM EVC RTNOPTHY: CPT | Performed by: FAMILY MEDICINE

## 2023-02-22 PROCEDURE — G8427 DOCREV CUR MEDS BY ELIG CLIN: HCPCS | Performed by: FAMILY MEDICINE

## 2023-02-22 PROCEDURE — G8417 CALC BMI ABV UP PARAM F/U: HCPCS | Performed by: FAMILY MEDICINE

## 2023-02-22 RX ORDER — DOXYCYCLINE HYCLATE 100 MG
100 TABLET ORAL 2 TIMES DAILY
Qty: 14 TABLET | Refills: 0 | Status: SHIPPED | OUTPATIENT
Start: 2023-02-22 | End: 2023-03-01

## 2023-02-22 ASSESSMENT — PATIENT HEALTH QUESTIONNAIRE - PHQ9
SUM OF ALL RESPONSES TO PHQ QUESTIONS 1-9: 0
SUM OF ALL RESPONSES TO PHQ9 QUESTIONS 1 & 2: 0
SUM OF ALL RESPONSES TO PHQ QUESTIONS 1-9: 0
1. LITTLE INTEREST OR PLEASURE IN DOING THINGS: 0
2. FEELING DOWN, DEPRESSED OR HOPELESS: 0

## 2023-02-22 NOTE — PROGRESS NOTES
Patient ID: Frankie Florentino Sr. 1950    . Chief Complaint   Patient presents with    Neck Pain     Pinch nerve in his neck     Hypertension    Diabetes    Otalgia     Outside the ear is painful, was swollen but has gone down     Leg Pain     Upper left leg painful to walk on, did have knee replacement in  February 2021. Coronary Artery Disease  Presents for follow-up visit. (Managed by cardiology) Risk factors include hyperlipidemia. Compliance with medications is good. Hyperlipidemia  This is a [facility]) problem. Associated symptoms include leg pain. Current antihyperlipidemic treatment includes statins. Risk factors for coronary artery disease include male sex and diabetes mellitus. Hypertension  This is a chronic ([facility]) problem. The current episode started more than 1 year ago. Past treatments include beta blockers. Diabetes  He presents for his follow-up diabetic visit. He has type 2 diabetes mellitus. Risk factors for coronary artery disease include male sex and sedentary lifestyle. Current diabetic treatment includes oral agent (monotherapy). He is compliant with treatment most of the time. Home blood sugar record trend: does not know A1c.  [facility]. Leg Pain   Incident onset: left leg. recent knee surgery. orthos says he needs PT to strenghthen muscle. Swollen ear: For about a week. Has rash inside his ear. Ear is painful.     Patient Active Problem List   Diagnosis    CAD (coronary artery disease)    Hyperlipidemia    AGUILAR (dyspnea on exertion)    Dizziness    2-vessel coronary artery disease    Obesity    Spinal stenosis of lumbar region with radiculopathy    Gait disturbance    Essential hypertension    Status post left knee replacement       Past Surgical History:   Procedure Laterality Date    COLONOSCOPY  06/01/2010    LAMINECTOMY      TOTAL KNEE ARTHROPLASTY  01/01/2005    left knee       Family History   Problem Relation Age of Onset Alzheimer's Disease Mother     Alzheimer's Disease Father     Coronary Art Dis Brother     Heart Failure Brother         CABG    Coronary Art Dis Brother     Heart Failure Brother         CABG    Coronary Art Dis Brother     Heart Failure Brother         CABG       Current Outpatient Medications on File Prior to Visit   Medication Sig Dispense Refill    metoprolol tartrate (LOPRESSOR) 50 MG tablet Take 1 tablet by mouth 2 times daily 180 tablet 3    Zolpidem Tartrate (AMBIEN PO) Take by mouth      pravastatin (PRAVACHOL) 40 MG tablet Take 1 tablet by mouth daily 30 tablet 5    Acetaminophen (TYLENOL 8 HOUR PO) Take by mouth      metFORMIN (GLUCOPHAGE) 500 MG tablet Take 500 mg by mouth daily (with breakfast)      aspirin 81 MG tablet Take 81 mg by mouth daily. Travoprost, ILYA Free, (TRAVATAN Z) 0.004 % SOLN ophthalmic solution Place 1 drop into both eyes nightly. No current facility-administered medications on file prior to visit. Objective:         Physical Exam  Constitutional:       General: He is not in acute distress. Appearance: Normal appearance. He is well-developed. HENT:      Head: Normocephalic and atraumatic. Right Ear: Hearing, tympanic membrane and external ear normal.      Left Ear: Hearing, tympanic membrane and external ear normal.      Ears:        Nose: No mucosal edema or rhinorrhea. Mouth/Throat:      Mouth: Mucous membranes are moist.      Pharynx: Oropharynx is clear. No oropharyngeal exudate or posterior oropharyngeal erythema. Tonsils: No tonsillar abscesses. Eyes:      General: Lids are normal.      Conjunctiva/sclera: Conjunctivae normal.   Neck:      Thyroid: No thyromegaly. Trachea: No tracheal deviation. Cardiovascular:      Rate and Rhythm: Normal rate and regular rhythm. Heart sounds: Normal heart sounds, S1 normal and S2 normal. No murmur heard. No gallop.    Pulmonary:      Effort: Pulmonary effort is normal. No respiratory distress. Breath sounds: Normal breath sounds. No wheezing or rales. Abdominal:      Palpations: Abdomen is soft. There is no mass. Tenderness: There is no abdominal tenderness. Musculoskeletal:      Right lower leg: No edema. Left lower leg: No edema. Legs:       Comments: Walks slowly with walker   Skin:     General: Skin is warm and dry. Neurological:      Mental Status: He is oriented to person, place, and time. Psychiatric:         Speech: Speech normal.         Behavior: Behavior normal.         Thought Content: Thought content normal.     Vitals:    02/22/23 1434 02/22/23 1440   BP: (!) 148/80 (!) 140/80   Site: Left Upper Arm    Position: Sitting    Cuff Size: Medium Adult    Pulse: 86    SpO2: 96%    Weight: 205 lb 3.2 oz (93.1 kg)    Height: 6' (1.829 m)      Body mass index is 27.83 kg/m². Wt Readings from Last 3 Encounters:   02/22/23 205 lb 3.2 oz (93.1 kg)   12/12/22 222 lb (100.7 kg)   06/09/22 214 lb 6.4 oz (97.3 kg)     BP Readings from Last 3 Encounters:   02/22/23 (!) 140/80   12/12/22 136/80   06/09/22 130/86          No results found for this visit on 02/22/23. The ASCVD Risk score ([place] DK, et al., 2019) failed to calculate for the following reasons:    Cannot find a previous HDL lab    Cannot find a previous total cholesterol lab  Lab Review   No visits with results within 2 Month(s) from this visit. Latest known visit with results is:   Procedure visit on 06/24/2022   Component Date Value    Left Ventricular Ejectio* 06/24/2022 58     LVEF MODALITY 06/24/2022 ECHO            Assessment:       Diagnosis Orders   1. Primary hypertension        2. Type 2 diabetes mellitus without complication, without long-term current use of insulin ([address]        3. Atrophy of quadriceps femoris muscle        4. Rash  fluocinonide (LIDEX) 0.05 % cream      5.  Swelling of left ear  doxycycline hyclate (VIBRA-TABS) 100 MG tablet              Plan: Hypertension borderline. Continue with the metoprolol prescribed by cardiology. Patient may benefit from having an ACE inhibitor added on. We will get records from the [place]. I believe patient's quadriceps is atrophied. This is consistent with what he is telling me regarding his orthopedics surgeon plan for physical therapy. I suspect it is atrophy due to his knee surgery. Return in about 25 weeks (around 8/16/2023) for DM, HTN no A1c, (AWV LPN, schedule on phone), HTN.

## 2023-03-08 ENCOUNTER — TELEMEDICINE (OUTPATIENT)
Dept: FAMILY MEDICINE CLINIC | Age: 73
End: 2023-03-08
Payer: MEDICARE

## 2023-03-08 DIAGNOSIS — Z00.00 INITIAL MEDICARE ANNUAL WELLNESS VISIT: Primary | ICD-10-CM

## 2023-03-08 PROCEDURE — G0438 PPPS, INITIAL VISIT: HCPCS | Performed by: FAMILY MEDICINE

## 2023-03-08 PROCEDURE — 1123F ACP DISCUSS/DSCN MKR DOCD: CPT | Performed by: FAMILY MEDICINE

## 2023-03-08 PROCEDURE — 3017F COLORECTAL CA SCREEN DOC REV: CPT | Performed by: FAMILY MEDICINE

## 2023-03-08 PROCEDURE — G8484 FLU IMMUNIZE NO ADMIN: HCPCS | Performed by: FAMILY MEDICINE

## 2023-03-08 SDOH — ECONOMIC STABILITY: FOOD INSECURITY: WITHIN THE PAST 12 MONTHS, THE FOOD YOU BOUGHT JUST DIDN'T LAST AND YOU DIDN'T HAVE MONEY TO GET MORE.: NEVER TRUE

## 2023-03-08 SDOH — ECONOMIC STABILITY: FOOD INSECURITY: WITHIN THE PAST 12 MONTHS, YOU WORRIED THAT YOUR FOOD WOULD RUN OUT BEFORE YOU GOT MONEY TO BUY MORE.: NEVER TRUE

## 2023-03-08 SDOH — ECONOMIC STABILITY: HOUSING INSECURITY
IN THE LAST 12 MONTHS, WAS THERE A TIME WHEN YOU DID NOT HAVE A STEADY PLACE TO SLEEP OR SLEPT IN A SHELTER (INCLUDING NOW)?: NO

## 2023-03-08 SDOH — ECONOMIC STABILITY: INCOME INSECURITY: HOW HARD IS IT FOR YOU TO PAY FOR THE VERY BASICS LIKE FOOD, HOUSING, MEDICAL CARE, AND HEATING?: NOT HARD AT ALL

## 2023-03-08 ASSESSMENT — PATIENT HEALTH QUESTIONNAIRE - PHQ9
SUM OF ALL RESPONSES TO PHQ QUESTIONS 1-9: 1
SUM OF ALL RESPONSES TO PHQ9 QUESTIONS 1 & 2: 1
SUM OF ALL RESPONSES TO PHQ QUESTIONS 1-9: 1
SUM OF ALL RESPONSES TO PHQ QUESTIONS 1-9: 1
2. FEELING DOWN, DEPRESSED OR HOPELESS: 1
1. LITTLE INTEREST OR PLEASURE IN DOING THINGS: 0
SUM OF ALL RESPONSES TO PHQ QUESTIONS 1-9: 1

## 2023-03-08 ASSESSMENT — LIFESTYLE VARIABLES
HOW MANY STANDARD DRINKS CONTAINING ALCOHOL DO YOU HAVE ON A TYPICAL DAY: PATIENT DOES NOT DRINK
HOW OFTEN DO YOU HAVE A DRINK CONTAINING ALCOHOL: NEVER

## 2023-03-08 NOTE — PATIENT INSTRUCTIONS
Personalized Preventive Plan for Nubia Burk Sr. - 3/8/2023  Medicare offers a range of preventive health benefits. Some of the tests and screenings are paid in full while other may be subject to a deductible, co-insurance, and/or copay. Some of these benefits include a comprehensive review of your medical history including lifestyle, illnesses that may run in your family, and various assessments and screenings as appropriate. After reviewing your medical record and screening and assessments performed today your provider may have ordered immunizations, labs, imaging, and/or referrals for you. A list of these orders (if applicable) as well as your Preventive Care list are included within your After Visit Summary for your review. Other Preventive Recommendations:    A preventive eye exam performed by an eye specialist is recommended every 1-2 years to screen for glaucoma; cataracts, macular degeneration, and other eye disorders. A preventive dental visit is recommended every 6 months. Try to get at least 150 minutes of exercise per week or 10,000 steps per day on a pedometer . Order or download the FREE \"Exercise & Physical Activity: Your Everyday Guide\" from [employer]. Call 5-597.682.5994 or search [employer] online. You need 8167-7476 mg of calcium and 8409-1293 IU of vitamin D per day. It is possible to meet your calcium requirement with diet alone, but a vitamin D supplement is usually necessary to meet this goal.  When exposed to the sun, use a sunscreen that protects against both UVA and UVB radiation with an SPF of 30 or greater. Reapply every 2 to 3 hours or after sweating, drying off with a towel, or swimming. Always wear a seat belt when traveling in a car. Always wear a helmet when riding a bicycle or motorcycle.

## 2023-03-08 NOTE — PROGRESS NOTES
Medicare Annual Wellness Visit    [address] Miguel Angel is here for Medicare AWV    Assessment & Plan   Initial Medicare annual wellness visit      Recommendations for Preventive Services Due: see orders and patient instructions/AVS.  Recommended screening schedule for the next 5-10 years is provided to the patient in written form: see Patient Instructions/AVS.     No follow-ups on file. Subjective       Patient's complete Health Risk Assessment and screening values have been reviewed and are found in Flowsheets. The following problems were reviewed today and where indicated follow up appointments were made and/or referrals ordered. Positive Risk Factor Screenings with Interventions:    Fall Risk:  Do you feel unsteady or are you worried about falling? : (!) yes (cane or walker prn)  2 or more falls in past year?: no  Fall with injury in past year?: no     Interventions:    Patient comments: patient states he usually uses his walker, but, also has a cane he will use as needed. Patient declines any further evaluation or treatment                Hearing Screen:  Do you or your family notice any trouble with your hearing that hasn't been managed with hearing aids?: (!) Yes ([facility])    Interventions:  Patient comments: patient states he is in the process of getting hearing aids through the [place]. Patient declines any further evaluation or treatment      ADL's:   Patient reports needing help with:  Select all that apply: Elijah Miller, Shopping, Food Preparation, Transportation (friend)  Interventions:  Patient comments: patient states he has a friend that helps him with his ADLs. Patient declined any further interventions or treatment                    Objective      Patient-Reported Vitals  No data recorded     Unable to obtain 3 vital signs due to patient not having equipment to take blood pressure/temperature.            Allergies   Allergen Reactions    Crestor [Rosuvastatin Calcium]     Lipitor [Atorvastatin] Other (See Comments)     Leg pain      Celebrex [Celecoxib] Other (See Comments)     Prior to Visit Medications    Medication Sig Taking? Authorizing Provider   fluocinonide (LIDEX) 0.05 % cream Apply topically 2 times daily Apply topically 2 times daily. Yes Manolo Bajwa MD   metoprolol tartrate (LOPRESSOR) 50 MG tablet Take 1 tablet by mouth 2 times daily Yes Rafael Meyer MD   Zolpidem Tartrate (AMBIEN PO) Take by mouth Yes Historical Provider, MD   pravastatin (PRAVACHOL) 40 MG tablet Take 1 tablet by mouth daily Yes Rafael Meyer MD   Acetaminophen (TYLENOL 8 HOUR PO) Take by mouth Yes Historical Provider, MD   metFORMIN (GLUCOPHAGE) 500 MG tablet Take 500 mg by mouth daily (with breakfast) Yes Historical Provider, MD   aspirin 81 MG tablet Take 81 mg by mouth daily. Yes Historical Provider, MD   Travoprost, ILYA Free, (TRAVATAN Z) 0.004 % SOLN ophthalmic solution Place 1 drop into both eyes nightly. Yes Historical Provider, MD       CareTeam (Including outside providers/suppliers regularly involved in providing care):   Patient Care Team:  Manolo Bajwa MD as PCP - General (Family Medicine)  Manolo Bajwa MD as PCP - Empaneled Provider  Rafael Meyer MD as Consulting Physician (Cardiology)     Reviewed and updated this visit:  Allergies  Meds  Med Hx  Surg Hx  Soc Hx  Fam Hx        I, Barbi Fontana LPN, 3/1/[birth year]78, performed the documented evaluation under the direct supervision of the attending physician. Fernando Singh, was evaluated through a synchronous (real-time) audio encounter. The patient (or guardian if applicable) is aware that this is a billable service, which includes applicable co-pays. This Virtual Visit was conducted with patient's (and/or legal guardian's) consent.  The visit was conducted pursuant to the emergency declaration under the [address] [facility] waiver authority and the [employer] Appropriations Act. Patient identification was verified, and a caregiver was present when appropriate. The patient was located at Home: [address]  Provider was located at [facility] (Appt Dept): [address]         Total time spent for this encounter: Not billed by time    --Montserrat Ennis LPN on 6/0/8398 at 43:32 AM    An electronic signature was used to authenticate this note.        Montserrat Ennis LPN

## 2023-07-05 DIAGNOSIS — E78.2 MIXED HYPERLIPIDEMIA: Primary | ICD-10-CM

## 2023-07-05 RX ORDER — PRAVASTATIN SODIUM 40 MG
40 TABLET ORAL DAILY
Qty: 90 TABLET | Refills: 1 | Status: SHIPPED | OUTPATIENT
Start: 2023-07-05

## 2023-07-11 ENCOUNTER — TELEPHONE (OUTPATIENT)
Dept: CARDIOLOGY CLINIC | Age: 73
End: 2023-07-11

## 2023-08-16 ENCOUNTER — OFFICE VISIT (OUTPATIENT)
Dept: FAMILY MEDICINE CLINIC | Age: 73
End: 2023-08-16
Payer: MEDICARE

## 2023-08-16 VITALS
BODY MASS INDEX: 31.02 KG/M2 | WEIGHT: 229 LBS | HEART RATE: 76 BPM | DIASTOLIC BLOOD PRESSURE: 84 MMHG | SYSTOLIC BLOOD PRESSURE: 132 MMHG | HEIGHT: 72 IN | OXYGEN SATURATION: 97 %

## 2023-08-16 DIAGNOSIS — M54.32 LEFT SCIATIC NERVE PAIN: Primary | ICD-10-CM

## 2023-08-16 DIAGNOSIS — F51.01 PRIMARY INSOMNIA: ICD-10-CM

## 2023-08-16 DIAGNOSIS — E11.9 TYPE 2 DIABETES MELLITUS WITHOUT COMPLICATION, UNSPECIFIED WHETHER LONG TERM INSULIN USE (HCC): ICD-10-CM

## 2023-08-16 PROCEDURE — 3046F HEMOGLOBIN A1C LEVEL >9.0%: CPT | Performed by: FAMILY MEDICINE

## 2023-08-16 PROCEDURE — G8417 CALC BMI ABV UP PARAM F/U: HCPCS | Performed by: FAMILY MEDICINE

## 2023-08-16 PROCEDURE — 3079F DIAST BP 80-89 MM HG: CPT | Performed by: FAMILY MEDICINE

## 2023-08-16 PROCEDURE — 3075F SYST BP GE 130 - 139MM HG: CPT | Performed by: FAMILY MEDICINE

## 2023-08-16 PROCEDURE — 99214 OFFICE O/P EST MOD 30 MIN: CPT | Performed by: FAMILY MEDICINE

## 2023-08-16 PROCEDURE — 3017F COLORECTAL CA SCREEN DOC REV: CPT | Performed by: FAMILY MEDICINE

## 2023-08-16 PROCEDURE — 1123F ACP DISCUSS/DSCN MKR DOCD: CPT | Performed by: FAMILY MEDICINE

## 2023-08-16 PROCEDURE — 1036F TOBACCO NON-USER: CPT | Performed by: FAMILY MEDICINE

## 2023-08-16 PROCEDURE — 2022F DILAT RTA XM EVC RTNOPTHY: CPT | Performed by: FAMILY MEDICINE

## 2023-08-16 PROCEDURE — G8427 DOCREV CUR MEDS BY ELIG CLIN: HCPCS | Performed by: FAMILY MEDICINE

## 2023-08-16 RX ORDER — TRAMADOL HYDROCHLORIDE 50 MG/1
50 TABLET ORAL EVERY 6 HOURS PRN
Qty: 20 TABLET | Refills: 0 | Status: SHIPPED | OUTPATIENT
Start: 2023-08-16 | End: 2023-08-23

## 2023-08-16 RX ORDER — BACLOFEN 20 MG/1
20 TABLET ORAL 2 TIMES DAILY
Qty: 40 TABLET | Refills: 0 | Status: SHIPPED | OUTPATIENT
Start: 2023-08-16

## 2023-08-16 ASSESSMENT — ENCOUNTER SYMPTOMS: BACK PAIN: 1

## 2023-08-16 NOTE — PROGRESS NOTES
Patient ID: Negro Hanley Sr. 1950    . Chief Complaint   Patient presents with    Hypertension    Diabetes    Sleep Problem     Wants to know if he can substitute Ambien doesn't know the dose knows he take 1/2 tablet once a day at night for sleep           Hypertension  This is a chronic ([facility]) problem. The current episode started more than 1 year ago. Past treatments include beta blockers. Diabetes  He presents for his follow-up diabetic visit. He has type 2 diabetes mellitus. Risk factors for coronary artery disease include male sex and sedentary lifestyle. Current diabetic treatment includes oral agent (monotherapy). He is compliant with treatment most of the time. Home blood sugar record trend: does not know A1c.  [facility]. Sleep Problem  This is a new (he is ok with getting this treated at the [place]) problem. Back Pain  This is a recurrent (sciatica. has had some treatment through the [place] for this) problem. The pain is moderate. The symptoms are aggravated by standing and bending. Treatments tried: his previous doctor would give him tramadol and that helpd a lot.        Patient Active Problem List   Diagnosis    CAD (coronary artery disease)    Hyperlipidemia    AGUILAR (dyspnea on exertion)    Dizziness    2-vessel coronary artery disease    Obesity    Spinal stenosis of lumbar region with radiculopathy    Gait disturbance    Essential hypertension    Status post left knee replacement    Type 2 diabetes mellitus       Past Surgical History:   Procedure Laterality Date    COLONOSCOPY  06/01/2010    LAMINECTOMY      TOTAL KNEE ARTHROPLASTY  01/01/2005    left knee       Family History   Problem Relation Age of Onset    Alzheimer's Disease Mother     Diabetes Father     Breast Cancer Sister     No Known Problems Sister     No Known Problems Sister     No Known Problems Sister     No Known Problems Sister     No Known Problems Brother     No Known Problems Brother     No Known Problems Brother

## 2023-09-01 ENCOUNTER — TELEPHONE (OUTPATIENT)
Dept: CARDIOLOGY CLINIC | Age: 73
End: 2023-09-01

## 2023-09-01 NOTE — TELEPHONE ENCOUNTER
Spoke with patient who stated he has been having dizziness and palpitations for a couple of days. Patient scheduled an appointment for 9/7.

## 2023-09-07 ENCOUNTER — OFFICE VISIT (OUTPATIENT)
Dept: CARDIOLOGY CLINIC | Age: 73
End: 2023-09-07
Payer: MEDICARE

## 2023-09-07 VITALS
HEIGHT: 72 IN | HEART RATE: 76 BPM | SYSTOLIC BLOOD PRESSURE: 138 MMHG | DIASTOLIC BLOOD PRESSURE: 88 MMHG | BODY MASS INDEX: 30.48 KG/M2 | WEIGHT: 225 LBS

## 2023-09-07 DIAGNOSIS — E78.2 MIXED HYPERLIPIDEMIA: ICD-10-CM

## 2023-09-07 DIAGNOSIS — I25.10 CORONARY ARTERY DISEASE INVOLVING NATIVE CORONARY ARTERY OF NATIVE HEART WITHOUT ANGINA PECTORIS: Primary | Chronic | ICD-10-CM

## 2023-09-07 DIAGNOSIS — I25.10 2-VESSEL CORONARY ARTERY DISEASE: ICD-10-CM

## 2023-09-07 DIAGNOSIS — I10 ESSENTIAL HYPERTENSION: ICD-10-CM

## 2023-09-07 DIAGNOSIS — E11.9 TYPE 2 DIABETES MELLITUS WITHOUT COMPLICATION, UNSPECIFIED WHETHER LONG TERM INSULIN USE (HCC): ICD-10-CM

## 2023-09-07 PROCEDURE — 1036F TOBACCO NON-USER: CPT | Performed by: INTERNAL MEDICINE

## 2023-09-07 PROCEDURE — 3075F SYST BP GE 130 - 139MM HG: CPT | Performed by: INTERNAL MEDICINE

## 2023-09-07 PROCEDURE — G8417 CALC BMI ABV UP PARAM F/U: HCPCS | Performed by: INTERNAL MEDICINE

## 2023-09-07 PROCEDURE — 3046F HEMOGLOBIN A1C LEVEL >9.0%: CPT | Performed by: INTERNAL MEDICINE

## 2023-09-07 PROCEDURE — G8427 DOCREV CUR MEDS BY ELIG CLIN: HCPCS | Performed by: INTERNAL MEDICINE

## 2023-09-07 PROCEDURE — 1123F ACP DISCUSS/DSCN MKR DOCD: CPT | Performed by: INTERNAL MEDICINE

## 2023-09-07 PROCEDURE — 93000 ELECTROCARDIOGRAM COMPLETE: CPT | Performed by: INTERNAL MEDICINE

## 2023-09-07 PROCEDURE — 3017F COLORECTAL CA SCREEN DOC REV: CPT | Performed by: INTERNAL MEDICINE

## 2023-09-07 PROCEDURE — 2022F DILAT RTA XM EVC RTNOPTHY: CPT | Performed by: INTERNAL MEDICINE

## 2023-09-07 PROCEDURE — 99214 OFFICE O/P EST MOD 30 MIN: CPT | Performed by: INTERNAL MEDICINE

## 2023-09-07 PROCEDURE — 3079F DIAST BP 80-89 MM HG: CPT | Performed by: INTERNAL MEDICINE

## 2023-09-07 RX ORDER — TRAMADOL HYDROCHLORIDE 50 MG/1
50 TABLET ORAL EVERY 6 HOURS PRN
COMMUNITY

## 2023-09-07 RX ORDER — METOPROLOL TARTRATE 75 MG/1
75 TABLET, FILM COATED ORAL 2 TIMES DAILY
Qty: 60 TABLET | Refills: 5 | Status: SHIPPED | OUTPATIENT
Start: 2023-09-07

## 2023-09-07 NOTE — PROGRESS NOTES
Carolynn Klein is a 68 y.o. male who has    CHIEF COMPLAINT AS FOLLOWS:  CHEST PAIN:  Patient denies any C/O chest pains at this time. SOB: No C/O SOB at this time. LEG EDEMA: No edema. PALPITATIONS:  C/O irregular heart beat. DIZZINESS: No C/O Dizziness           SYNCOPE: None   OTHER/ ADDITIONAL COMPLAINTS: Head aches Last week with elevated BP                                    HPI: Patient is here for F/U on his CAD, HTN & Dyslipidemia problems. CAD: Patient has known CAD. HTN: Patient has known essential HTN. Has been treated with guideline recommended medical / physical/ diet therapy as stated below. Dyslipidemia: Patient has known mixed dyslipidemia. Has been treated with guideline recommended medical / physical/ diet therapy as stated below. Current Outpatient Medications   Medication Sig Dispense Refill    traMADol (ULTRAM) 50 MG tablet Take 1 tablet by mouth every 6 hours as needed for Pain. Max Daily Amount: 200 mg      NONFORMULARY Capsaicin ointment for neuropathy   Get at the [place]      baclofen (LIORESAL) 20 MG tablet Take 1 tablet by mouth 2 times daily 40 tablet 0    pravastatin (PRAVACHOL) 40 MG tablet Take 1 tablet by mouth daily 90 tablet 1    metoprolol tartrate (LOPRESSOR) 50 MG tablet Take 1 tablet by mouth 2 times daily 180 tablet 3    Zolpidem Tartrate (AMBIEN PO) Take by mouth Dr at the [place]      Acetaminophen (TYLENOL 8 HOUR PO) Take by mouth      metFORMIN (GLUCOPHAGE) 500 MG tablet Take 1 tablet by mouth daily (with breakfast)      aspirin 81 MG tablet Take 1 tablet by mouth daily      Travoprost, BAK Free, (TRAVATAN Z) 0.004 % SOLN ophthalmic solution Place 1 drop into both eyes nightly       No current facility-administered medications for this visit.      Allergies: Crestor [rosuvastatin calcium], Lipitor [atorvastatin], and Celebrex [celecoxib]  Review of Systems:    Constitutional: Negative

## 2023-09-07 NOTE — PATIENT INSTRUCTIONS
CORONARY ARTERY DISEASE: Yes   clinically stable. Patient is on optimal medical regimen ( see medication list above )  - Patient is currently  asymptomatic from CAD. - changes in  treatment:   no, on Metoprolol & ASA           - Testing ordered:  no  [facility]     CARDIOLITE 1/2020  Normal Stress nuclear scintigraphic study suggestive of normal myocardial    perfusion. Gated images demonstrate normal left ventricular systolic function with EF    of 60 %. EKG: NSR, 76 / min, WNL     HTN: Not well controlled on current medical regimen, see list above. - changes in  treatment: Yes, on Metoprolol   CARDIOMYOPATHY: None known   CONGESTIVE HEART FAILURE: NO KNOWN HISTORY.      VHD: No significant VHD noted        Echo 6/24/2022    Limited study due to patients body habitus. Left ventricular function and size is normal, EF is estimated at 55-60%. Mild left ventricular hypertrophy. Grade I diastolic dysfunction. No regional wall motion abnormalities were detected. Mildly dilated left atrium. No significant valvular regurgitation or disease noted. Dilation of the aortic root(3.8cm) and the ascending aorta(4.0cm). No evidence of pericardial effusion. DYSLIPIDEMIA: Patient's profile is at / near [address] current medical regimen wellyes,  Takes Pravachol                                                             See most recent Lab values in Labs section above. TESTS ORDERED: none this visit. PREVIOUSLY ORDERED TESTS REVIEWED & DISCUSSED WITH THE PATIENT:     I personally reviewed & interpreted, all previously ordered tests as copied above. Latest Labs are pulled in to the note with dates.    Labs, specially in Reference to Lipid profile, Cardiac testing in the form of Echo ( dated: ), stress tests ( dated: ) & other relevant cardiac testing reviewed with patient &

## 2023-10-16 ENCOUNTER — TELEPHONE (OUTPATIENT)
Dept: FAMILY MEDICINE CLINIC | Age: 73
End: 2023-10-16

## 2023-10-16 NOTE — TELEPHONE ENCOUNTER
----- Message from Vivek Pleasure sent at 10/16/2023  1:09 PM EDT -----  Subject: Refill Request    QUESTIONS  Name of Medication? traMADol (ULTRAM) 50 MG tablet  Patient-reported dosage and instructions? 50MG half a tab a day  How many days do you have left? 7  [id number]  Pharmacy phone number (if available)? [phone]  ---------------------------------------------------------------------------  --------------  CALL BACK INFO  What is the best way for the office to contact you? OK to leave message on   voicemail  Preferred Call Back Phone Number? [phone]  ---------------------------------------------------------------------------  --------------  SCRIPT ANSWERS  Relationship to Patient?  Self

## 2023-10-17 NOTE — TELEPHONE ENCOUNTER
Per my last note, he was supposed to f/u with the [place] for his back pain and Tramadol. This is a controlled substance, so it has to be prescribed by only 1 doctor.

## 2023-10-17 NOTE — TELEPHONE ENCOUNTER
Notified patient he was [facility] and [facility]ol. Patient stated he is not seeing [facility]. They do prescribe lidocaine patches for back pain. He stated he goes to [place] every 6 months for a check up. Patient stated he does not abuse the pain medication.

## 2023-10-17 NOTE — TELEPHONE ENCOUNTER
There should be only one doctor treating his back. Can he give me the name and number for the provider at the [place] so that I can speak to them?   (He will  have to sign record release)

## 2023-10-26 NOTE — TELEPHONE ENCOUNTER
Patient stated the doctor at the [facility] is Dr Karen Curiel [phone]. Patient stated they are doing nothing for his back accept prescribing lidocaine patches. Patient stated he wants his back evaluated. He stated he will come into office to sign record release.

## 2023-11-01 ENCOUNTER — TELEPHONE (OUTPATIENT)
Dept: FAMILY MEDICINE CLINIC | Age: 73
End: 2023-11-01

## 2023-11-01 NOTE — TELEPHONE ENCOUNTER
Doctor at the [place] is Dr Kaye Cornea [phone]. Patient came into sign record release. Familia Grover MD  to [facility] Practice Staff        10/17/23  7:17 AM  Note      Per my last note, he was supposed to f/u with the [place] for his back pain and Tramadol. This is a controlled substance, so it has to be prescribed by only 1 doctor. Me  to Familia Grover MD    DM    10/17/23 11:34 AM  Note      Notified patient he was [facility] with [facility] and [facility]adol. Patient stated [facility] not [facility]. They do prescribe lidocaine patches for back pain. He stated he goes to [place] every 6 months for a check up. Patient stated he does not abuse the pain medication. Familia Grover MD  to [facility] Practice Staff        10/17/23 12:26 PM  Note      There should be only one doctor treating his back. Can he give me the name and number for the provider at the [place] so that I can speak to them? (He will  have to sign record release)        October 18, 2023  [place], [place]     NS    10/18/23  2:21 PM  Note      Lm to return call         October 26, 2023  [facility]    10/26/23  8:57 AM  Note      LM patient to return call         Me     DM    10/26/23 11:06 AM  Note      Patient stated the doctor at the [place] is Dr Kaye Cornea [phone]. Patient stated they are doing nothing for his back accept prescribing lidocaine patches. Patient stated he wants his back evaluated. He stated he will come into office to sign record release.

## 2023-11-09 ENCOUNTER — OFFICE VISIT (OUTPATIENT)
Dept: FAMILY MEDICINE CLINIC | Age: 73
End: 2023-11-09

## 2023-11-09 VITALS
OXYGEN SATURATION: 98 % | WEIGHT: 229 LBS | SYSTOLIC BLOOD PRESSURE: 122 MMHG | DIASTOLIC BLOOD PRESSURE: 84 MMHG | HEART RATE: 78 BPM | BODY MASS INDEX: 31.06 KG/M2

## 2023-11-09 DIAGNOSIS — M48.061 SPINAL STENOSIS OF LUMBAR REGION WITH RADICULOPATHY: Primary | ICD-10-CM

## 2023-11-09 DIAGNOSIS — M25.562 LEFT KNEE PAIN, UNSPECIFIED CHRONICITY: ICD-10-CM

## 2023-11-09 DIAGNOSIS — Z23 NEED FOR INFLUENZA VACCINATION: ICD-10-CM

## 2023-11-09 DIAGNOSIS — M54.16 SPINAL STENOSIS OF LUMBAR REGION WITH RADICULOPATHY: Primary | ICD-10-CM

## 2023-11-09 DIAGNOSIS — N52.8 OTHER MALE ERECTILE DYSFUNCTION: ICD-10-CM

## 2023-11-09 DIAGNOSIS — M17.12 PRIMARY OSTEOARTHRITIS OF LEFT KNEE: ICD-10-CM

## 2023-11-09 RX ORDER — TAMSULOSIN HYDROCHLORIDE 0.4 MG/1
0.4 CAPSULE ORAL DAILY
COMMUNITY
Start: 2023-10-23

## 2023-11-09 RX ORDER — TRAMADOL HYDROCHLORIDE 50 MG/1
50 TABLET ORAL 2 TIMES DAILY PRN
Qty: 60 TABLET | Refills: 0 | Status: SHIPPED | OUTPATIENT
Start: 2023-11-09 | End: 2023-12-09

## 2023-11-09 NOTE — PROGRESS NOTES
Patient ID: Adelia Ardon Sr. 1950    Chief Complaint   Patient presents with    Back Pain    Knee Pain     Left knee   had a knee replacement 2019          HPI    Back and left knee pain: chronic. Has had surgery on both. Lots of pain. Goes to the [place] but they won't give him Tramadol which helps. He does not take it often.         Patient Active Problem List   Diagnosis    CAD (coronary artery disease)    Hyperlipidemia    AGUILAR (dyspnea on exertion)    Dizziness    2-vessel coronary artery disease    Obesity    Spinal stenosis of lumbar region with radiculopathy    Gait disturbance    Essential hypertension    Status post left knee replacement    Type 2 diabetes mellitus       Past Surgical History:   Procedure Laterality Date    COLONOSCOPY  06/01/2010    LAMINECTOMY      TOTAL KNEE ARTHROPLASTY  01/01/2005    left knee       Family History   Problem Relation Age of Onset    Alzheimer's Disease Mother     Diabetes Father     Breast Cancer Sister     No Known Problems Sister     No Known Problems Sister     No Known Problems Sister     No Known Problems Sister     No Known Problems Brother     No Known Problems Brother     No Known Problems Brother     No Known Problems Brother     Heart Failure Maternal Grandfather        Current Outpatient Medications on File Prior to Visit   Medication Sig Dispense Refill    tamsulosin (FLOMAX) 0.4 MG capsule Take 1 capsule by mouth daily      metoprolol tartrate 75 MG TABS Take 75 mg by mouth 2 times daily 60 tablet 5    NONFORMULARY Capsaicin ointment for neuropathy   Get at the [place]      baclofen (LIORESAL) 20 MG tablet Take 1 tablet by mouth 2 times daily 40 tablet 0    pravastatin (PRAVACHOL) 40 MG tablet Take 1 tablet by mouth daily 90 tablet 1    Zolpidem Tartrate (AMBIEN PO) Take by mouth Dr at the [place]      Acetaminophen (TYLENOL 8 HOUR PO) Take by mouth      metFORMIN (GLUCOPHAGE) 500 MG tablet Take 1 tablet by mouth daily (with breakfast)      aspirin 81 MG tablet

## 2023-11-10 PROBLEM — N52.8 OTHER MALE ERECTILE DYSFUNCTION: Status: ACTIVE | Noted: 2023-11-10

## 2023-11-10 PROBLEM — M17.12 PRIMARY OSTEOARTHRITIS OF LEFT KNEE: Status: ACTIVE | Noted: 2023-11-10

## 2023-11-10 ASSESSMENT — ENCOUNTER SYMPTOMS: BACK PAIN: 1

## 2023-11-10 NOTE — PROGRESS NOTES
Patient ID: Juni Lelbanc Sr. 1950    Chief Complaint   Patient presents with    Back Pain    Knee Pain     Left knee   had a knee replacement 2019          Back Pain    Knee Pain         Back and left knee pain: chronic. Has had surgery on both. Lots of pain. Goes to the [facility] but they won't give him Tramadol which helps. He does not take it often. Just every few days to weeks.   [facility]oc[facility]      Patient Active Problem List   Diagnosis    CAD (coronary artery disease)    Hyperlipidemia    AGUILAR (dyspnea on exertion)    Dizziness    2-vessel coronary artery disease    Obesity    Spinal stenosis of lumbar region with radiculopathy    Gait disturbance    Essential hypertension    Status post left knee replacement    Type 2 diabetes mellitus    Other male erectile dysfunction    Primary osteoarthritis of left knee       Past Surgical History:   Procedure Laterality Date    COLONOSCOPY  06/01/2010    LAMINECTOMY      TOTAL KNEE ARTHROPLASTY  01/01/2005    left knee       Family History   Problem Relation Age of Onset    Alzheimer's Disease Mother     Diabetes Father     Breast Cancer Sister     No Known Problems Sister     No Known Problems Sister     No Known Problems Sister     No Known Problems Sister     No Known Problems Brother     No Known Problems Brother     No Known Problems Brother     No Known Problems Brother     Heart Failure Maternal Grandfather        Current Outpatient Medications on File Prior to Visit   Medication Sig Dispense Refill    tamsulosin (FLOMAX) 0.4 MG capsule Take 1 capsule by mouth daily      metoprolol tartrate 75 MG TABS Take 75 mg by mouth 2 times daily 60 tablet 5    NONFORMULARY Capsaicin ointment for neuropathy   Get at the [facility]      baclofen (LIORESAL) 20 MG tablet Take 1 tablet by mouth 2 times daily 40 tablet 0    pravastatin (PRAVACHOL) 40 MG tablet Take 1 tablet by mouth daily 90 tablet 1    Zolpidem Tartrate (AMBIEN PO) Take by mouth Dr at the

## 2023-12-15 ENCOUNTER — OFFICE VISIT (OUTPATIENT)
Dept: CARDIOLOGY CLINIC | Age: 73
End: 2023-12-15
Payer: MEDICARE

## 2023-12-15 VITALS
HEART RATE: 70 BPM | BODY MASS INDEX: 29.45 KG/M2 | WEIGHT: 217.4 LBS | DIASTOLIC BLOOD PRESSURE: 74 MMHG | SYSTOLIC BLOOD PRESSURE: 122 MMHG | HEIGHT: 72 IN

## 2023-12-15 DIAGNOSIS — E78.2 MIXED HYPERLIPIDEMIA: ICD-10-CM

## 2023-12-15 DIAGNOSIS — I10 ESSENTIAL HYPERTENSION: ICD-10-CM

## 2023-12-15 DIAGNOSIS — E11.9 TYPE 2 DIABETES MELLITUS WITHOUT COMPLICATION, UNSPECIFIED WHETHER LONG TERM INSULIN USE (HCC): ICD-10-CM

## 2023-12-15 DIAGNOSIS — I25.10 CORONARY ARTERY DISEASE INVOLVING NATIVE CORONARY ARTERY OF NATIVE HEART WITHOUT ANGINA PECTORIS: Primary | Chronic | ICD-10-CM

## 2023-12-15 PROCEDURE — G8417 CALC BMI ABV UP PARAM F/U: HCPCS | Performed by: INTERNAL MEDICINE

## 2023-12-15 PROCEDURE — 3078F DIAST BP <80 MM HG: CPT | Performed by: INTERNAL MEDICINE

## 2023-12-15 PROCEDURE — 3074F SYST BP LT 130 MM HG: CPT | Performed by: INTERNAL MEDICINE

## 2023-12-15 PROCEDURE — G8427 DOCREV CUR MEDS BY ELIG CLIN: HCPCS | Performed by: INTERNAL MEDICINE

## 2023-12-15 PROCEDURE — 1036F TOBACCO NON-USER: CPT | Performed by: INTERNAL MEDICINE

## 2023-12-15 PROCEDURE — G8484 FLU IMMUNIZE NO ADMIN: HCPCS | Performed by: INTERNAL MEDICINE

## 2023-12-15 PROCEDURE — 99213 OFFICE O/P EST LOW 20 MIN: CPT | Performed by: INTERNAL MEDICINE

## 2023-12-15 PROCEDURE — 3046F HEMOGLOBIN A1C LEVEL >9.0%: CPT | Performed by: INTERNAL MEDICINE

## 2023-12-15 PROCEDURE — 2022F DILAT RTA XM EVC RTNOPTHY: CPT | Performed by: INTERNAL MEDICINE

## 2023-12-15 PROCEDURE — 3017F COLORECTAL CA SCREEN DOC REV: CPT | Performed by: INTERNAL MEDICINE

## 2023-12-15 PROCEDURE — 1123F ACP DISCUSS/DSCN MKR DOCD: CPT | Performed by: INTERNAL MEDICINE

## 2023-12-15 RX ORDER — METOPROLOL TARTRATE 75 MG/1
75 TABLET, FILM COATED ORAL 2 TIMES DAILY
Qty: 60 TABLET | Refills: 5 | Status: SHIPPED | OUTPATIENT
Start: 2023-12-15

## 2023-12-15 RX ORDER — PRAVASTATIN SODIUM 40 MG
40 TABLET ORAL DAILY
Qty: 30 TABLET | Refills: 5 | Status: SHIPPED | OUTPATIENT
Start: 2023-12-15

## 2023-12-15 RX ORDER — METOPROLOL TARTRATE 75 MG/1
1 TABLET, FILM COATED ORAL 2 TIMES DAILY
Qty: 180 TABLET | OUTPATIENT
Start: 2023-12-15

## 2023-12-15 RX ORDER — TRAMADOL HYDROCHLORIDE 50 MG/1
25 TABLET ORAL EVERY 6 HOURS PRN
COMMUNITY

## 2023-12-15 NOTE — PROGRESS NOTES
classification: 1     CARDIOLITE 1/2020  Normal Stress nuclear scintigraphic study suggestive of normal myocardial    perfusion. Gated images demonstrate normal left ventricular systolic function with EF    of 60 %. EKG: NSR, 76 / min, WNL     HTN: Not well controlled on current medical regimen, see list above. - changes in  treatment: Yes, on Metoprolol   CARDIOMYOPATHY: None known   CONGESTIVE HEART FAILURE: NO KNOWN HISTORY.      VHD: No significant VHD noted        Echo 6/24/2022    Limited study due to patients body habitus. Left ventricular function and size is normal, EF is estimated at 55-60%. Mild left ventricular hypertrophy. Grade I diastolic dysfunction. No regional wall motion abnormalities were detected. Mildly dilated left atrium. No significant valvular regurgitation or disease noted. Dilation of the aortic root(3.8cm) and the ascending aorta(4.0cm). No evidence of pericardial effusion. DYSLIPIDEMIA: Patient's profile is at / near [address] [place] [address] current medical regimen wellyes,  Takes Pravachol                                                             See most recent Lab values in Labs section above. TESTS ORDERED: none this visit       PREVIOUSLY ORDERED TESTS REVIEWED & DISCUSSED WITH THE PATIENT:     I personally reviewed & interpreted, all previously ordered tests as copied above. Latest Labs are pulled in to the note with dates. Labs, specially in Reference to Lipid profile, Cardiac testing in the form of Echo ( dated: ), stress tests ( dated: ) & other relevant cardiac testing reviewed with patient & recommendations made based on assessment of the results. Discussed role of Cardiac risk factors & effects + treatment of co morbidities with patient & advised accordingly. MEDICATIONS: List of medications patient is currently taking is reviewed in detail with the patient.

## 2023-12-15 NOTE — PATIENT INSTRUCTIONS
CORONARY ARTERY DISEASE:Yes   clinically stable. Patient is on optimal medical regimen ( see medication list above )  - Patient is currently  asymptomatic from CAD. - changes in  treatment:   no, on Metoprolol & ASA           - Testing ordered:  [facility]  [facility]     CARDIOLITE 1/2020  Normal Stress nuclear scintigraphic study suggestive of normal myocardial    perfusion. Gated images demonstrate normal left ventricular systolic function with EF    of 60 %. EKG: NSR, 76 / min, WNL     HTN: Not well controlled on current medical regimen, see list above. - changes in  treatment: Yes, on Metoprolol   CARDIOMYOPATHY: None known   CONGESTIVE HEART FAILURE: NO KNOWN HISTORY.      VHD: No significant VHD noted        Echo 6/24/2022    Limited study due to patients body habitus. Left ventricular function and size is normal, EF is estimated at 55-60%. Mild left ventricular hypertrophy. Grade I diastolic dysfunction. No regional wall motion abnormalities were detected. Mildly dilated left atrium. No significant valvular regurgitation or disease noted. Dilation of the aortic root(3.8cm) and the ascending aorta(4.0cm). No evidence of pericardial effusion. DYSLIPIDEMIA: Patient's profile is at / near [address] [facility] [address] current medical regimen wellyes,  Takes Pravachol                                                             See most recent Lab values in Labs section above. TESTS ORDERED: none this visit       PREVIOUSLY ORDERED TESTS REVIEWED & DISCUSSED WITH THE PATIENT:     I personally reviewed & interpreted, all previously ordered tests as copied above. Latest Labs are pulled in to the note with dates.    Labs, specially in Reference to Lipid profile, Cardiac testing in the form of Echo ( dated: ), stress tests ( dated: ) & other relevant cardiac testing reviewed with patient &

## 2024-01-05 ENCOUNTER — TELEPHONE (OUTPATIENT)
Dept: CARDIOLOGY CLINIC | Age: 74
End: 2024-01-05

## 2024-03-04 ENCOUNTER — TELEPHONE (OUTPATIENT)
Dept: FAMILY MEDICINE CLINIC | Age: 74
End: 2024-03-04

## 2024-03-04 NOTE — TELEPHONE ENCOUNTER
Patient is asking if you could call in his Tramadol 50 mg? He stated he will be out before he comes in to the office to see you in May. He is taking it as directed, Please send medication to patients pharmacy

## 2024-03-13 ENCOUNTER — OFFICE VISIT (OUTPATIENT)
Dept: FAMILY MEDICINE CLINIC | Age: 74
End: 2024-03-13
Payer: MEDICARE

## 2024-03-13 VITALS
OXYGEN SATURATION: 96 % | BODY MASS INDEX: 30.46 KG/M2 | DIASTOLIC BLOOD PRESSURE: 77 MMHG | HEART RATE: 75 BPM | SYSTOLIC BLOOD PRESSURE: 138 MMHG | WEIGHT: 224.6 LBS

## 2024-03-13 DIAGNOSIS — K64.4 EXTERNAL HEMORRHOID: ICD-10-CM

## 2024-03-13 DIAGNOSIS — E11.42 TYPE 2 DIABETES MELLITUS WITH DIABETIC POLYNEUROPATHY, WITHOUT LONG-TERM CURRENT USE OF INSULIN (HCC): ICD-10-CM

## 2024-03-13 DIAGNOSIS — G89.29 CHRONIC PAIN OF LEFT KNEE: Primary | ICD-10-CM

## 2024-03-13 DIAGNOSIS — M25.562 CHRONIC PAIN OF LEFT KNEE: Primary | ICD-10-CM

## 2024-03-13 DIAGNOSIS — M25.562 LEFT KNEE PAIN, UNSPECIFIED CHRONICITY: ICD-10-CM

## 2024-03-13 DIAGNOSIS — Z12.11 SCREEN FOR COLON CANCER: ICD-10-CM

## 2024-03-13 PROCEDURE — G8417 CALC BMI ABV UP PARAM F/U: HCPCS | Performed by: FAMILY MEDICINE

## 2024-03-13 PROCEDURE — 82270 OCCULT BLOOD FECES: CPT | Performed by: FAMILY MEDICINE

## 2024-03-13 PROCEDURE — 3017F COLORECTAL CA SCREEN DOC REV: CPT | Performed by: FAMILY MEDICINE

## 2024-03-13 PROCEDURE — 2022F DILAT RTA XM EVC RTNOPTHY: CPT | Performed by: FAMILY MEDICINE

## 2024-03-13 PROCEDURE — 1036F TOBACCO NON-USER: CPT | Performed by: FAMILY MEDICINE

## 2024-03-13 PROCEDURE — 3046F HEMOGLOBIN A1C LEVEL >9.0%: CPT | Performed by: FAMILY MEDICINE

## 2024-03-13 PROCEDURE — G8427 DOCREV CUR MEDS BY ELIG CLIN: HCPCS | Performed by: FAMILY MEDICINE

## 2024-03-13 PROCEDURE — 3075F SYST BP GE 130 - 139MM HG: CPT | Performed by: FAMILY MEDICINE

## 2024-03-13 PROCEDURE — 1123F ACP DISCUSS/DSCN MKR DOCD: CPT | Performed by: FAMILY MEDICINE

## 2024-03-13 PROCEDURE — 99214 OFFICE O/P EST MOD 30 MIN: CPT | Performed by: FAMILY MEDICINE

## 2024-03-13 PROCEDURE — G8484 FLU IMMUNIZE NO ADMIN: HCPCS | Performed by: FAMILY MEDICINE

## 2024-03-13 PROCEDURE — 3078F DIAST BP <80 MM HG: CPT | Performed by: FAMILY MEDICINE

## 2024-03-13 RX ORDER — BENZOCAINE/MENTHOL 6 MG-10 MG
LOZENGE MUCOUS MEMBRANE
Qty: 30 G | Refills: 1 | Status: SHIPPED | OUTPATIENT
Start: 2024-03-13 | End: 2024-03-20

## 2024-03-13 RX ORDER — TRAMADOL HYDROCHLORIDE 50 MG/1
50 TABLET ORAL 2 TIMES DAILY PRN
Qty: 60 TABLET | Refills: 0 | Status: SHIPPED | OUTPATIENT
Start: 2024-03-13 | End: 2024-03-14 | Stop reason: SDUPTHER

## 2024-03-13 SDOH — ECONOMIC STABILITY: FOOD INSECURITY: WITHIN THE PAST 12 MONTHS, THE FOOD YOU BOUGHT JUST DIDN'T LAST AND YOU DIDN'T HAVE MONEY TO GET MORE.: NEVER TRUE

## 2024-03-13 SDOH — ECONOMIC STABILITY: INCOME INSECURITY: HOW HARD IS IT FOR YOU TO PAY FOR THE VERY BASICS LIKE FOOD, HOUSING, MEDICAL CARE, AND HEATING?: NOT HARD AT ALL

## 2024-03-13 SDOH — ECONOMIC STABILITY: FOOD INSECURITY: WITHIN THE PAST 12 MONTHS, YOU WORRIED THAT YOUR FOOD WOULD RUN OUT BEFORE YOU GOT MONEY TO BUY MORE.: NEVER TRUE

## 2024-03-13 ASSESSMENT — PATIENT HEALTH QUESTIONNAIRE - PHQ9
SUM OF ALL RESPONSES TO PHQ QUESTIONS 1-9: 0
2. FEELING DOWN, DEPRESSED OR HOPELESS: 0
SUM OF ALL RESPONSES TO PHQ QUESTIONS 1-9: 0
1. LITTLE INTEREST OR PLEASURE IN DOING THINGS: 0
SUM OF ALL RESPONSES TO PHQ QUESTIONS 1-9: 0
SUM OF ALL RESPONSES TO PHQ QUESTIONS 1-9: 0
SUM OF ALL RESPONSES TO PHQ9 QUESTIONS 1 & 2: 0

## 2024-03-13 NOTE — PATIENT INSTRUCTIONS
NEW OFFICE HOURS: M-TH 7AM-5PM      BRING YOUR MEDICATION BOTTLES TO ALL APPOINTMENTS    Check MY CHART for test results.  If you have forgotten your password, call [phone].    The diagnoses and medications listed in this after visit summary may not be up to date.  Check MY CHART in 28-48 hours for corrections.      Late cancellation policy: So that we can better accommodate people who are sick, please give our office 24 hour notice for an appointment cancellation.      Missed appointments: Your care is very important to us.  It is important that you keep your scheduled appointments.   Multiple missed appointments will lead to a dismissal from the office.      Patients arriving late will be worked into the schedule as time permits, with patients arriving on time taken as scheduled. Late arriving patients are more than welcome to wait or reschedule their appointments.    Please allow 5-7 business days for paperwork to be processed.

## 2024-03-13 NOTE — PROGRESS NOTES
Patient ID: Liam Velasco Sr. 1950    Chief Complaint   Patient presents with    Back Pain    Knee Pain     Left knee is  worst     Hemorrhoids         HPI    Chronic back and knee pain:  Takes tylenol 500 mg 1-2 times per day.  If takes more, becomes sleepy.    1/2 Tramadol once per day.  Ibuprofen 200 mg once per day.    Hemorrhoid: 1 week.  Wife took picture of it and sent it to family member who is nurse.  He is taking preparation H for few days.  Has had colonoscopy.  Thinks he had a polyp.  Not sure when he is due for next one.    [facility]  Comes here for acute problems and for the Tramadol    Left knee pain: chronic knee pain.  Was seeing ortho but would like 2nd opinion.  Takes 1/2 tramadol per day for this    Patient Active Problem List   Diagnosis    CAD (coronary artery disease)    Hyperlipidemia    AGUILAR (dyspnea on exertion)    Dizziness    2-vessel coronary artery disease    Obesity    Spinal stenosis of lumbar region with radiculopathy    Gait disturbance    Essential hypertension    Status post left knee replacement    Type 2 diabetes mellitus with diabetic polyneuropathy, without long-term current use of insulin ([facility])    Other male erectile dysfunction    Primary osteoarthritis of left knee       Past Surgical History:   Procedure Laterality Date    COLONOSCOPY  06/01/2010    LAMINECTOMY      TOTAL KNEE ARTHROPLASTY  01/01/2005    left knee       Family History   Problem Relation Age of Onset    Alzheimer's Disease Mother     Diabetes Father     Breast Cancer Sister     No Known Problems Sister     No Known Problems Sister     No Known Problems Sister     No Known Problems Sister     No Known Problems Brother     No Known Problems Brother     No Known Problems Brother     No Known Problems Brother     Heart Failure Maternal Grandfather        Current Outpatient Medications on File Prior to Visit   Medication Sig Dispense Refill    traMADol (ULTRAM) 50 MG

## 2024-03-14 RX ORDER — TRAMADOL HYDROCHLORIDE 50 MG/1
50 TABLET ORAL 2 TIMES DAILY PRN
Qty: 60 TABLET | Refills: 1 | Status: SHIPPED | OUTPATIENT
Start: 2024-03-14 | End: 2024-04-13

## 2024-03-15 ENCOUNTER — TELEPHONE (OUTPATIENT)
Dept: FAMILY MEDICINE CLINIC | Age: 74
End: 2024-03-15

## 2024-03-15 NOTE — TELEPHONE ENCOUNTER
Patient called stating either the pharmacy give him the wrong medicine or you sent in the wrong medication for his hemorrhoids, patient stated he received hydrocortisone which is for itching and bug bites.Patient is wanting to know if you can call in preparation h for him

## 2024-03-20 ENCOUNTER — TELEMEDICINE (OUTPATIENT)
Dept: FAMILY MEDICINE CLINIC | Age: 74
End: 2024-03-20
Payer: MEDICARE

## 2024-03-20 DIAGNOSIS — Z00.00 MEDICARE ANNUAL WELLNESS VISIT, SUBSEQUENT: Primary | ICD-10-CM

## 2024-03-20 PROCEDURE — 3017F COLORECTAL CA SCREEN DOC REV: CPT | Performed by: FAMILY MEDICINE

## 2024-03-20 PROCEDURE — G8484 FLU IMMUNIZE NO ADMIN: HCPCS | Performed by: FAMILY MEDICINE

## 2024-03-20 PROCEDURE — G0439 PPPS, SUBSEQ VISIT: HCPCS | Performed by: FAMILY MEDICINE

## 2024-03-20 PROCEDURE — 1123F ACP DISCUSS/DSCN MKR DOCD: CPT | Performed by: FAMILY MEDICINE

## 2024-03-20 ASSESSMENT — PATIENT HEALTH QUESTIONNAIRE - PHQ9
SUM OF ALL RESPONSES TO PHQ QUESTIONS 1-9: 1
SUM OF ALL RESPONSES TO PHQ QUESTIONS 1-9: 1
1. LITTLE INTEREST OR PLEASURE IN DOING THINGS: NOT AT ALL
SUM OF ALL RESPONSES TO PHQ QUESTIONS 1-9: 1
SUM OF ALL RESPONSES TO PHQ QUESTIONS 1-9: 1
2. FEELING DOWN, DEPRESSED OR HOPELESS: SEVERAL DAYS
SUM OF ALL RESPONSES TO PHQ9 QUESTIONS 1 & 2: 1

## 2024-03-20 ASSESSMENT — LIFESTYLE VARIABLES
HOW OFTEN DO YOU HAVE A DRINK CONTAINING ALCOHOL: NEVER
HOW MANY STANDARD DRINKS CONTAINING ALCOHOL DO YOU HAVE ON A TYPICAL DAY: PATIENT DOES NOT DRINK

## 2024-03-20 NOTE — PROGRESS NOTES
This encounter was performed under my, Chaya Verma MD’s, direct supervision, 3/20/2024.   
co-pays. This Virtual Visit was conducted with patient's (and/or legal guardian's) consent. Patient identification was verified, and a caregiver was present when appropriate.   The patient was located at Home: [address] [place] [address]  Provider was located at Facility (Appt Dept): [address] [place] [address]

## 2024-04-03 ENCOUNTER — OFFICE VISIT (OUTPATIENT)
Dept: ORTHOPEDIC SURGERY | Age: 74
End: 2024-04-03
Payer: MEDICARE

## 2024-04-03 VITALS — HEART RATE: 91 BPM | RESPIRATION RATE: 17 BRPM | TEMPERATURE: 98.9 F | OXYGEN SATURATION: 98 %

## 2024-04-03 DIAGNOSIS — M17.12 ARTHRITIS OF LEFT KNEE: Primary | ICD-10-CM

## 2024-04-03 PROCEDURE — 1036F TOBACCO NON-USER: CPT

## 2024-04-03 PROCEDURE — G8417 CALC BMI ABV UP PARAM F/U: HCPCS

## 2024-04-03 PROCEDURE — 99203 OFFICE O/P NEW LOW 30 MIN: CPT

## 2024-04-03 PROCEDURE — G8427 DOCREV CUR MEDS BY ELIG CLIN: HCPCS

## 2024-04-03 PROCEDURE — 1123F ACP DISCUSS/DSCN MKR DOCD: CPT

## 2024-04-03 PROCEDURE — 3017F COLORECTAL CA SCREEN DOC REV: CPT

## 2024-04-03 ASSESSMENT — ENCOUNTER SYMPTOMS
SHORTNESS OF BREATH: 0
COUGH: 0
BACK PAIN: 0
NAUSEA: 0
RHINORRHEA: 0
FACIAL SWELLING: 0

## 2024-04-03 NOTE — PATIENT INSTRUCTIONS
Continue weight-bearing as tolerated.  Continue range of motion exercises as instructed.  Ice and elevate as needed.  Tylenol or Motrin for pain.  Out patient Physical Therapy has been ordered by your provider. [facility] will call you to set up therapy. If you have not heard from them within 24-48 hours of today's appointment, please reach out to them at [phone].  Follow up in 2 months

## 2024-04-03 NOTE — PROGRESS NOTES
Patient seen in office today for:  Chronic left knee pain, no injury     DOS: 2005 & 2019, TKA on left knee   Date of last injection: Hx of inj but doesn't know dates    Patient reports 8/10 pain.  RICE and medication are effective to alleviate pain and reduce swelling.     Pain worsened by: Patient reports painful ROM & weight bearing.     Xrays performed in office today.     
(See Comments)         Review of Systems   Constitutional:  Negative for fever.   HENT:  Negative for facial swelling and rhinorrhea.    Respiratory:  Negative for cough and shortness of breath.    Cardiovascular:  Negative for chest pain.   Gastrointestinal:  Negative for nausea.   Musculoskeletal:  Positive for arthralgias. Negative for back pain, gait problem, joint swelling, myalgias, neck pain and neck stiffness.   Skin:  Negative for pallor and rash.   Neurological:  Negative for facial asymmetry and speech difficulty.   Psychiatric/Behavioral:  Negative for agitation and confusion.                                                Examination:  General Exam:  Vitals: Pulse 91   Temp 98.9 °F (37.2 °C)   Resp 17   SpO2 98%    Physical Exam  Constitutional:       General: He is not in acute distress.     Appearance: Normal appearance. He is not ill-appearing.   HENT:      Head: Normocephalic and atraumatic.      Nose: No rhinorrhea.   Eyes:      General: No scleral icterus.        Right eye: No discharge.         Left eye: No discharge.   Pulmonary:      Effort: Pulmonary effort is normal. No respiratory distress.      Breath sounds: Normal breath sounds.   Musculoskeletal:      Cervical back: Normal range of motion.      Comments: Left Lower Extremity:    There is mild diffuse tenderness to palpation throughout the knee maximally along the superior knee.  There is mild global swelling anteriorly at the knee with no obvious effusion.  There is 5 out of 5 strength with knee flexion and extension.  Sensation is intact throughout the lower extremity.  There is full range of motion at the knee with discomfort at the extremes of motion, especially terminal flexion.  No instability with varus or valgus stress testing or anterior/posterior drawer testing.  Medial Emmy's test produces mild pain but no palpable click.  Skin is intact with well-healed scars from previous knee surgeries..  Normal knee alignment. Pulses

## 2024-04-17 ENCOUNTER — HOSPITAL ENCOUNTER (OUTPATIENT)
Dept: PHYSICAL THERAPY | Age: 74
Setting detail: THERAPIES SERIES
Discharge: HOME | End: 2024-04-17

## 2024-04-29 LAB
CHOLESTEROL, TOTAL: 179 MG/DL
CHOLESTEROL/HDL RATIO: NORMAL
HDLC SERPL-MCNC: 44 MG/DL (ref 35–70)
LDL CHOLESTEROL CALCULATED: 108 MG/DL (ref 0–160)
NONHDLC SERPL-MCNC: NORMAL MG/DL
TRIGL SERPL-MCNC: 134 MG/DL
VLDLC SERPL CALC-MCNC: NORMAL MG/DL

## 2024-04-30 ENCOUNTER — HOSPITAL ENCOUNTER (OUTPATIENT)
Dept: PHYSICAL THERAPY | Age: 74
Setting detail: THERAPIES SERIES
Discharge: HOME | End: 2024-04-30
Payer: MEDICARE

## 2024-04-30 PROCEDURE — 97110 THERAPEUTIC EXERCISES: CPT

## 2024-04-30 PROCEDURE — 97161 PT EVAL LOW COMPLEX 20 MIN: CPT

## 2024-04-30 ASSESSMENT — PAIN DESCRIPTION - ORIENTATION: ORIENTATION: LEFT

## 2024-04-30 ASSESSMENT — PAIN DESCRIPTION - LOCATION: LOCATION: KNEE

## 2024-04-30 ASSESSMENT — PAIN SCALES - GENERAL: PAINLEVEL_OUTOF10: 4

## 2024-04-30 NOTE — PROGRESS NOTES
training, Functional mobility training, IADL training, ADL/Self-care training, Gait training, Stair training, Neuromuscular re-education, Manual, Pain management, Home exercise program, Safety education & training, Patient/Caregiver education & training, Modalities, Therapeutic activities     Frequency / Duration:  Patient to be seen 1 for 6 weeks      Eval Complexity:    Decision Making: Low Complexity          Therapist Signature: Aylin Simons, PT, DPT, Cert. DN      Date: 4/30/2024     I certify that the above Therapy Services are being furnished while the patient is under my care. I agree with the treatment plan and certify that this therapy is necessary.      Physician's Signature:  ___________________________   Date:_______                                                                   Densy Zapata PA-C        Physician Comments: _______________________________________________    Please sign and return to [facility].  Please fax to the location listed below. THANK YOU for this referral!    [facility]  [address]  [facility] [address]  Dept: [phone]  Dept Fax: [phone]  Loc: [phone]       POC NOTE

## 2024-04-30 NOTE — FLOWSHEET NOTE
Outpatient Physical Therapy  [place]           [x] Phone: [phone]   Fax: [phone]  [place]           [] Phone: [phone]   Fax: [phone]        Physical Therapy Daily Treatment Note  Date:  2024    Patient Name:  Liam Velasco Sr.    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  Short term goals  Time Frame for Short term goals: refer to [facility]                 Long Term Goals  Time Frame for Long Term Goals: 6 visits  Pt will report overall improvement in condition by 50% or more  pt will improve KOOS to 13/38 or less to show subjective improvement  pt will improve L knee flexion AROM to 117 deg for improved stair negotiation  pt will improve L knee extension AROM to 0 deg for improved TKE  pt will improve TUG to 22 sec or less with rollator for improved getting to the bathroom      Summary of Evaluation:  Assessment: Pt is a 74 yo male that presents to therapy with complaints of L knee pain. His L knee was replaced in  and redone in . pt amb with rollator with antalgic gait, decreased flexion of the L knee with swing phase. Pt has no stairs at home and has a ramped entrance. 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 Pt would like to schedule once every other week with PT. Pt demo impaired LLE ROM/strength, STS, gait, activity tolerance, functional mobility,

## 2024-04-30 NOTE — PLAN OF CARE
understanding of HEP and appears to be motivated to participate in an active PT program including compliance with HEP expectations.        Plan of Care/Treatment to date:  [x] Therapeutic Exercise  [x] Modalities:  [x] Therapeutic Activity     [] Ultrasound  [] Electrical Stimulation  [x] Gait Training      [] Cervical Traction [] Lumbar Traction  [x] Neuromuscular Re-education    [] Cold/hotpack [] Iontophoresis   [x] Instruction in HEP      [x] Vasopneumatic    [] Dry Needling  [x] Manual Therapy               [] Aquatic Therapy       Other:          Frequency/Duration:  # Days per week: [x] 1 day # Weeks: [] 1 week [] 5 weeks     [] 2 days   [] 2 weeks [x] 6 weeks     [] 3 days   [] 3 weeks [] 7 weeks     [] 4 days   [] 4 weeks [] 8 weeks         [] 9 weeks [] 10 weeks         [] 11 weeks [] 12 weeks    Rehab Potential/Progress: [] Excellent [] Good [x] Fair  [] Poor     Goals:    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  Short term goals  Time Frame for Short term goals: refer to [facility]                 Long Term Goals  Time Frame for Long Term Goals: 6 visits  Pt will report overall improvement in condition by 50% or more  pt will improve KOOS to 13/38 or less to show subjective improvement  pt will improve L knee flexion AROM to 117 deg for improved stair negotiation  pt will improve L knee extension AROM to 0 deg for improved TKE  pt will improve TUG to 22 sec or less with rollator for improved getting to the bathroom        Electronically signed by:  Aylin Simons, PT, DPT, Cert. DN   , 4/30/2024, 2:51 PM        If you have any questions or concerns, please don't hesitate to call.  Thank you for your referral.      Physician Signature:________________________________Date:_________ TIME: _____  By signing above, therapist’s plan is approved by physician

## 2024-05-14 ENCOUNTER — HOSPITAL ENCOUNTER (OUTPATIENT)
Dept: PHYSICAL THERAPY | Age: 74
Setting detail: THERAPIES SERIES
Discharge: HOME | End: 2024-05-14
Payer: MEDICARE

## 2024-05-14 PROCEDURE — 97530 THERAPEUTIC ACTIVITIES: CPT

## 2024-05-14 PROCEDURE — 97110 THERAPEUTIC EXERCISES: CPT

## 2024-05-14 NOTE — FLOWSHEET NOTE
Assessment:  Pt demonstrated overall good tolerance to advanced exercises today. Reports compliance with his HEP. Will continue with therapy progressing as tolerated.   End pain 0/10    Assessment: Pt is a 72 yo male that presents to therapy with complaints of L knee pain. His L knee was replaced in 2005 and redone in 2019. pt amb with rollator with antalgic gait, decreased flexion of the L knee with swing phase. Pt has no stairs at home and has a ramped entrance. 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 Pt would like to schedule once every other week with PT. Pt demo impaired LLE ROM/strength, STS, gait, activity tolerance, functional mobility, adls, stair negotiation and increased pain. Pt would benefit from continued skilled physical therapy to address impairments and limitations, progress toward goal completion, promote independence with ADLs, and prevent further injury. End pain: same       Plan for Next Session:   Specific Instructions for Next Treatment: LLE strength/ROM, STS, gait, stair progression, TKE    Time In / Time Out:    1300/1345      Timed Code/Total Treatment Minutes:  45/45, (2) TE, (1) TA       Next Progress Note due:  due 5/30      Plan of Care Interventions:  [x] Therapeutic Exercise  [x] Modalities:  [x] Therapeutic Activity     [] Ultrasound  [] Estim  [x] Gait Training      [] Cervical Traction [] Lumbar Traction  [x] Neuromuscular Re-education    [] Cold/hotpack [] Iontophoresis   [x] Instruction in HEP      [x] Vasopneumatic   [] Dry Needling    [x] Manual Therapy               [] Aquatic Therapy              Electronically signed by: Luz Berger PTA   , 5/14/2024, 12:58 PM

## 2024-05-28 ENCOUNTER — HOSPITAL ENCOUNTER (OUTPATIENT)
Dept: PHYSICAL THERAPY | Age: 74
Discharge: HOME | End: 2024-05-28

## 2024-05-28 NOTE — FLOWSHEET NOTE
Physical Therapy  Cancellation/No-show Note  Patient Name:  Liam Velasco Sr.  :  1950   Date:  2024  Cancelled visits to date:1  No-shows to date: 0    For today's appointment patient:  [x]  Cancelled  []  Rescheduled appointment  []  No-show     Reason given by patient:  [x]  Patient ill  []  Conflicting appointment  []  No transportation    []  Conflict with work  []  No reason given  []  Other:     Comments:      Electronically signed by:  Luz Berger PTA 2024, 12:19 PM

## 2024-06-04 ENCOUNTER — OFFICE VISIT (OUTPATIENT)
Dept: ORTHOPEDIC SURGERY | Age: 74
End: 2024-06-04
Payer: MEDICARE

## 2024-06-04 VITALS — TEMPERATURE: 97.4 F | OXYGEN SATURATION: 97 % | HEART RATE: 70 BPM

## 2024-06-04 DIAGNOSIS — M17.12 ARTHRITIS OF LEFT KNEE: Primary | ICD-10-CM

## 2024-06-04 PROCEDURE — G8417 CALC BMI ABV UP PARAM F/U: HCPCS

## 2024-06-04 PROCEDURE — 1123F ACP DISCUSS/DSCN MKR DOCD: CPT

## 2024-06-04 PROCEDURE — 99213 OFFICE O/P EST LOW 20 MIN: CPT

## 2024-06-04 PROCEDURE — G8427 DOCREV CUR MEDS BY ELIG CLIN: HCPCS

## 2024-06-04 PROCEDURE — 1036F TOBACCO NON-USER: CPT

## 2024-06-04 PROCEDURE — 3017F COLORECTAL CA SCREEN DOC REV: CPT

## 2024-06-11 ENCOUNTER — HOSPITAL ENCOUNTER (OUTPATIENT)
Dept: PHYSICAL THERAPY | Age: 74
Discharge: HOME | End: 2024-06-11

## 2024-06-11 NOTE — FLOWSHEET NOTE
Physical Therapy  Cancellation/No-show Note  Patient Name:  Liam Velasco Sr.  :  1950   Date:  2024  Cancelled visits to date:2  No-shows to date: 0    For today's appointment patient:  [x]  Cancelled  []  Rescheduled appointment  []  No-show     Reason given by patient:  []  Patient ill  []  Conflicting appointment  []  No transportation    []  Conflict with work  [x]  No reason given  []  Other:     Comments:      Electronically signed by:  Leatha Trinh PTA, BSPTA 2024, 11:55 AM

## 2024-06-13 RX ORDER — METOPROLOL TARTRATE 75 MG/1
1 TABLET, FILM COATED ORAL 2 TIMES DAILY
Qty: 60 TABLET | Refills: 5 | Status: SHIPPED | OUTPATIENT
Start: 2024-06-13

## 2024-06-17 ENCOUNTER — TELEPHONE (OUTPATIENT)
Dept: CARDIOLOGY CLINIC | Age: 74
End: 2024-06-17

## 2024-06-17 ASSESSMENT — ENCOUNTER SYMPTOMS
BACK PAIN: 0
FACIAL SWELLING: 0
SHORTNESS OF BREATH: 0
RHINORRHEA: 0
NAUSEA: 0
COUGH: 0

## 2024-06-17 NOTE — PROGRESS NOTES
Patient seen in office today for:Left knee pain, Hx of (2) TKA, PT check  Patient states that he had a knee replacement roughly 17 years ago and 3 to 4 years ago had the replacement redone on the left knee. Patient states the knee has recently been increasing and discomfort especially around the quad tendon area. He states that bending maneuvers worsen the pain. He denies any acute or traumatic injury that would easily explain his symptoms.   Date of last injection: has had them in the past not sure of dates  DOS:2005&2019 TKA left knee  Patient reports 5/10 pain.  RICE and medication are effective to alleviate pain and reduce swelling.   Pain also alleviated by: OTC medication  Pain worsened by: Patient reports painful ROM & weight bearing.   No injections ever in his left knee he would like to have one above the knee a little bit in the muscle is where it hurts the worse if allowed   
post history of left knee revision knee replacement    -Explained to the patient that he is at a transition point where he can make the best attempt to recreate physical therapy stretches and strengthening maneuvers and a home exercise program.  I feel he be best served by joining a gym.  -I prescribed the patient a Voltaren gel to help with pain and instructed him to apply it regularly as directed.  -Patient can continue to continue utilizing RICE and over-the-counter pain medication as well as bracing to help with his knee pain.  -Patient directed to follow-up as needed with any future orthopedic concerns.    Electronically signed by Denys Zapata PA-C on 6/17/2024 at 7:35 AM

## 2024-06-17 NOTE — TELEPHONE ENCOUNTER
Called to remind pt to have CMP & Lipid panel completed prior to OV. Pt stated that he would not be able to get blood work done at this time. He will call for OV after getting it completed.

## 2024-06-25 RX ORDER — PRAVASTATIN SODIUM 40 MG
40 TABLET ORAL DAILY
Qty: 30 TABLET | Refills: 5 | Status: SHIPPED | OUTPATIENT
Start: 2024-06-25 | End: 2024-06-26 | Stop reason: SDUPTHER

## 2024-06-25 RX ORDER — PRAVASTATIN SODIUM 40 MG
40 TABLET ORAL DAILY
Qty: 30 TABLET | Refills: 5 | OUTPATIENT
Start: 2024-06-25

## 2024-06-25 NOTE — TELEPHONE ENCOUNTER
Dr riky galvin dr said that dr haile might want to increase the pt statin, because cholesterol is still a little high

## 2024-06-26 NOTE — ADDENDUM NOTE
Addended by: SHO GARG on: 6/26/2024 04:15 PM     Modules accepted: Orders    
[FreeTextEntry1] : Here for annual physical\par recently saw Cardiologist  
Bladder non-tender and non-distended. Urine clear yellow.

## 2024-06-27 RX ORDER — PRAVASTATIN SODIUM 80 MG/1
80 TABLET ORAL DAILY
Qty: 30 TABLET | Refills: 5 | Status: SHIPPED | OUTPATIENT
Start: 2024-06-27

## 2024-07-25 ENCOUNTER — OFFICE VISIT (OUTPATIENT)
Dept: CARDIOLOGY CLINIC | Age: 74
End: 2024-07-25
Payer: MEDICARE

## 2024-07-25 VITALS
WEIGHT: 224 LBS | SYSTOLIC BLOOD PRESSURE: 132 MMHG | BODY MASS INDEX: 30.34 KG/M2 | DIASTOLIC BLOOD PRESSURE: 78 MMHG | HEIGHT: 72 IN | HEART RATE: 84 BPM

## 2024-07-25 DIAGNOSIS — E78.2 MIXED HYPERLIPIDEMIA: ICD-10-CM

## 2024-07-25 DIAGNOSIS — I25.10 CORONARY ARTERY DISEASE INVOLVING NATIVE CORONARY ARTERY OF NATIVE HEART WITHOUT ANGINA PECTORIS: Primary | Chronic | ICD-10-CM

## 2024-07-25 DIAGNOSIS — I25.10 2-VESSEL CORONARY ARTERY DISEASE: ICD-10-CM

## 2024-07-25 DIAGNOSIS — E11.42 TYPE 2 DIABETES MELLITUS WITH DIABETIC POLYNEUROPATHY, WITHOUT LONG-TERM CURRENT USE OF INSULIN (HCC): ICD-10-CM

## 2024-07-25 DIAGNOSIS — I10 ESSENTIAL HYPERTENSION: ICD-10-CM

## 2024-07-25 PROCEDURE — 1036F TOBACCO NON-USER: CPT | Performed by: INTERNAL MEDICINE

## 2024-07-25 PROCEDURE — 2022F DILAT RTA XM EVC RTNOPTHY: CPT | Performed by: INTERNAL MEDICINE

## 2024-07-25 PROCEDURE — G8427 DOCREV CUR MEDS BY ELIG CLIN: HCPCS | Performed by: INTERNAL MEDICINE

## 2024-07-25 PROCEDURE — 1123F ACP DISCUSS/DSCN MKR DOCD: CPT | Performed by: INTERNAL MEDICINE

## 2024-07-25 PROCEDURE — 99213 OFFICE O/P EST LOW 20 MIN: CPT | Performed by: INTERNAL MEDICINE

## 2024-07-25 PROCEDURE — 93000 ELECTROCARDIOGRAM COMPLETE: CPT | Performed by: INTERNAL MEDICINE

## 2024-07-25 PROCEDURE — 3046F HEMOGLOBIN A1C LEVEL >9.0%: CPT | Performed by: INTERNAL MEDICINE

## 2024-07-25 PROCEDURE — 3075F SYST BP GE 130 - 139MM HG: CPT | Performed by: INTERNAL MEDICINE

## 2024-07-25 PROCEDURE — 3078F DIAST BP <80 MM HG: CPT | Performed by: INTERNAL MEDICINE

## 2024-07-25 PROCEDURE — 3017F COLORECTAL CA SCREEN DOC REV: CPT | Performed by: INTERNAL MEDICINE

## 2024-07-25 PROCEDURE — G8417 CALC BMI ABV UP PARAM F/U: HCPCS | Performed by: INTERNAL MEDICINE

## 2024-07-25 NOTE — PROGRESS NOTES
OFFICE PROGRESS NOTES      Liam is a 73 y.o. male who has    CHIEF COMPLAINT AS FOLLOWS:  CHEST PAIN: Patient denies any C/O chest pains at this time.      SOB: No C/O SOB at this time.               LEG EDEMA: No edema.   PALPITATIONS:  C/O irregular heart beat.                                   DIZZINESS: Some Dizziness  along the knee pain    SYNCOPE: None   OTHER/ ADDITIONAL COMPLAINTS:                                     HPI: Patient is here for F/U on his CAD,  HTN & Dyslipidemia problems.   CAD: Patient has known CAD.   HTN: Patient has known essential HTN. Has been treated with guideline recommended medical / physical/ diet therapy as stated below.  Dyslipidemia: Patient has known mixed dyslipidemia. Has been treated with guideline recommended medical / physical/ diet therapy as stated below.                Current Outpatient Medications   Medication Sig Dispense Refill    pravastatin (PRAVACHOL) 80 MG tablet Take 1 tablet by mouth daily 30 tablet 5    Metoprolol Tartrate 75 MG TABS Take 1 tablet by mouth 2 times daily 60 tablet 5    diclofenac sodium (VOLTAREN) 1 % GEL Apply 2 g topically 4 times daily 150 g 2    traMADol (ULTRAM) 50 MG tablet Take 0.5 tablets by mouth every 6 hours as needed for Pain.      NONFORMULARY [facility]icin [facility]ment [facility]   [facility]      Zolpidem Tartrate (AMBIEN PO) [facility]      Acetaminophen (TYLENOL 8 HOUR PO) Take by mouth      metFORMIN (GLUCOPHAGE) 500 MG tablet Take 1 tablet by mouth daily (with breakfast)      aspirin 81 MG tablet Take 1 tablet by mouth daily      tamsulosin (FLOMAX) 0.4 MG capsule Take 1 capsule by mouth daily (Patient not taking: Reported on 3/13/2024)      Travoprost, BAK Free, (TRAVATAN Z) 0.004 % SOLN ophthalmic solution Place 1 drop into both eyes nightly (Patient not taking: Reported on 3/13/2024)       No current facility-administered medications for this visit.     Allergies: Crestor [rosuvastatin calcium],

## 2024-07-25 NOTE — PATIENT INSTRUCTIONS
Please be informed that if you contact our office outside of normal business hours the physician on call cannot help with any scheduling or rescheduling issues, procedure instruction questions or any type of medication issue.    We advise you for any urgent/emergency that you go to the nearest emergency room!    PLEASE CALL OUR OFFICE DURING NORMAL BUSINESS HOURS    Monday - Friday   8 am to 5 pm    [place]: [phone]      CORONARY ARTERY DISEASE:Yes   clinically stable. Patient is on optimal medical regimen ( see medication list above )  - Patient is currently  asymptomatic from CAD.            - changes in  treatment:   no, on Metoprolol & ASA           - Testing ordered:  no  [place] classification: 1     CARDIOLITE 1/2020  Normal Stress nuclear scintigraphic study suggestive of normal myocardial    perfusion.    Gated images demonstrate normal left ventricular systolic function with EF    of 60 %.      EKG: NSR 84/min, WNL     HTN:  well controlled on current medical regimen, see list above.              - changes in  treatment: Yes, on Metoprolol.     CARDIOMYOPATHY: None known   CONGESTIVE HEART FAILURE: NO KNOWN HISTORY.      VHD: No significant VHD noted        Echo 6/24/2022    Limited study due to patients body habitus.   Left ventricular function and size is normal, EF is estimated at 55-60%.   Mild left ventricular hypertrophy.   Grade I diastolic dysfunction.   No regional wall motion abnormalities were detected.   Mildly dilated left atrium.   No significant valvular regurgitation or disease noted.   Dilation of the aortic root(3.8cm) and the ascending aorta(4.0cm).   No evidence of pericardial effusion.     DYSLIPIDEMIA: Patient's profile is at / near Goal.yes,                                                               Tolerating current medical regimen wellyes,  Takes Pravachol                                                             See most recent Lab values in Labs section above.

## 2024-08-08 DIAGNOSIS — K64.4 EXTERNAL HEMORRHOID: ICD-10-CM

## 2024-08-09 RX ORDER — BENZOCAINE/MENTHOL 6 MG-10 MG
LOZENGE MUCOUS MEMBRANE
Qty: 28 G | OUTPATIENT
Start: 2024-08-09

## 2024-08-15 ENCOUNTER — TELEPHONE (OUTPATIENT)
Dept: FAMILY MEDICINE CLINIC | Age: 74
End: 2024-08-15

## 2024-08-15 NOTE — TELEPHONE ENCOUNTER
Patient called asking for a refill of his Tramadol 50 mg. Please send script to patients pharmacy

## 2024-08-15 NOTE — TELEPHONE ENCOUNTER
Staff, please check with pharmacy to see if has refills on hold.  I gave him enough to last until seen for appt.

## 2024-09-12 ENCOUNTER — OFFICE VISIT (OUTPATIENT)
Dept: FAMILY MEDICINE CLINIC | Age: 74
End: 2024-09-12

## 2024-09-12 VITALS
HEART RATE: 80 BPM | SYSTOLIC BLOOD PRESSURE: 138 MMHG | DIASTOLIC BLOOD PRESSURE: 82 MMHG | OXYGEN SATURATION: 97 % | WEIGHT: 228.2 LBS | BODY MASS INDEX: 30.94 KG/M2

## 2024-09-12 DIAGNOSIS — M25.562 LEFT KNEE PAIN, UNSPECIFIED CHRONICITY: Primary | ICD-10-CM

## 2024-09-12 DIAGNOSIS — Z23 NEED FOR INFLUENZA VACCINATION: ICD-10-CM

## 2024-09-12 DIAGNOSIS — R21 RASH: ICD-10-CM

## 2024-09-12 DIAGNOSIS — B36.9 FUNGAL RASH OF TORSO: ICD-10-CM

## 2024-09-12 RX ORDER — TRAMADOL HYDROCHLORIDE 50 MG/1
50 TABLET ORAL 2 TIMES DAILY PRN
Qty: 60 TABLET | Refills: 1 | Status: SHIPPED | OUTPATIENT
Start: 2024-09-12 | End: 2025-03-12

## 2024-09-12 RX ORDER — KETOCONAZOLE 20 MG/G
CREAM TOPICAL 2 TIMES DAILY
Qty: 30 G | Refills: 1 | Status: SHIPPED | OUTPATIENT
Start: 2024-09-12

## 2024-09-25 ENCOUNTER — TELEPHONE (OUTPATIENT)
Dept: FAMILY MEDICINE CLINIC | Age: 74
End: 2024-09-25

## 2024-12-02 DIAGNOSIS — B36.9 FUNGAL RASH OF TORSO: ICD-10-CM

## 2024-12-02 RX ORDER — KETOCONAZOLE 20 MG/G
CREAM TOPICAL 2 TIMES DAILY
Qty: 30 G | Refills: 1 | Status: CANCELLED | OUTPATIENT
Start: 2024-12-02

## 2024-12-02 NOTE — TELEPHONE ENCOUNTER
Patient called asking for refills, I called over to the pharmacy there are no refills or nothing on hold for the patient

## 2024-12-03 DIAGNOSIS — B36.9 FUNGAL RASH OF TORSO: ICD-10-CM

## 2024-12-03 RX ORDER — KETOCONAZOLE 20 MG/G
CREAM TOPICAL 2 TIMES DAILY
Qty: 30 G | Refills: 1 | Status: SHIPPED | OUTPATIENT
Start: 2024-12-03

## 2024-12-03 NOTE — TELEPHONE ENCOUNTER
Lm for patient to return call,  Dr. Verma stated this medication was not meant to be a long term medication is he still having the rashes

## 2024-12-03 NOTE — TELEPHONE ENCOUNTER
Patient stated every now and than he still gets the rashes he wanted to have some for a back up just incase a rash might pop up

## 2024-12-10 RX ORDER — METOPROLOL TARTRATE 75 MG/1
1 TABLET ORAL 2 TIMES DAILY
Qty: 60 TABLET | Refills: 5 | Status: SHIPPED | OUTPATIENT
Start: 2024-12-10

## 2024-12-17 DIAGNOSIS — I25.10 CORONARY ARTERY DISEASE INVOLVING NATIVE CORONARY ARTERY OF NATIVE HEART WITHOUT ANGINA PECTORIS: Primary | ICD-10-CM

## 2024-12-17 RX ORDER — PRAVASTATIN SODIUM 80 MG/1
80 TABLET ORAL DAILY
Qty: 30 TABLET | Refills: 5 | OUTPATIENT
Start: 2024-12-17

## 2024-12-17 RX ORDER — PRAVASTATIN SODIUM 80 MG/1
80 TABLET ORAL DAILY
Qty: 30 TABLET | Refills: 5 | Status: SHIPPED | OUTPATIENT
Start: 2024-12-17

## 2025-03-04 ENCOUNTER — HOSPITAL ENCOUNTER (OUTPATIENT)
Age: 75
Discharge: HOME | End: 2025-03-04
Payer: MEDICARE

## 2025-03-04 DIAGNOSIS — I25.10 CORONARY ARTERY DISEASE INVOLVING NATIVE CORONARY ARTERY OF NATIVE HEART WITHOUT ANGINA PECTORIS: ICD-10-CM

## 2025-03-04 LAB
ANION GAP SERPL CALCULATED.3IONS-SCNC: 13 MMOL/L (ref 9–17)
BUN SERPL-MCNC: 11 MG/DL (ref 7–20)
CALCIUM SERPL-MCNC: 9.7 MG/DL (ref 8.3–10.6)
CHLORIDE SERPL-SCNC: 103 MMOL/L (ref 99–110)
CO2 SERPL-SCNC: 23 MMOL/L (ref 21–32)
CREAT SERPL-MCNC: 0.8 MG/DL (ref 0.8–1.3)
GFR, ESTIMATED: >90 ML/MIN/1.73M2
GLUCOSE SERPL-MCNC: 140 MG/DL (ref 74–99)
POTASSIUM SERPL-SCNC: 3.9 MMOL/L (ref 3.5–5.1)
SODIUM SERPL-SCNC: 139 MMOL/L (ref 136–145)

## 2025-03-04 PROCEDURE — 80048 BASIC METABOLIC PNL TOTAL CA: CPT

## 2025-03-12 ENCOUNTER — OFFICE VISIT (OUTPATIENT)
Dept: FAMILY MEDICINE CLINIC | Age: 75
End: 2025-03-12

## 2025-03-12 VITALS
SYSTOLIC BLOOD PRESSURE: 132 MMHG | OXYGEN SATURATION: 98 % | HEART RATE: 71 BPM | BODY MASS INDEX: 29.67 KG/M2 | DIASTOLIC BLOOD PRESSURE: 72 MMHG | WEIGHT: 218.8 LBS

## 2025-03-12 DIAGNOSIS — R21 RASH: ICD-10-CM

## 2025-03-12 DIAGNOSIS — Z12.5 PROSTATE CANCER SCREENING: ICD-10-CM

## 2025-03-12 DIAGNOSIS — R35.0 URINE FREQUENCY: Primary | ICD-10-CM

## 2025-03-12 DIAGNOSIS — M25.562 LEFT KNEE PAIN, UNSPECIFIED CHRONICITY: ICD-10-CM

## 2025-03-12 LAB
BILIRUBIN, POC: NEGATIVE
BLOOD URINE, POC: NEGATIVE
CLARITY, POC: ABNORMAL
COLOR, POC: ABNORMAL
GLUCOSE URINE, POC: NEGATIVE MG/DL
KETONES, POC: NEGATIVE MG/DL
LEUKOCYTE EST, POC: ABNORMAL
NITRITE, POC: NEGATIVE
PH, POC: 7
PROTEIN, POC: NEGATIVE MG/DL
SPECIFIC GRAVITY, POC: 1.01
UROBILINOGEN, POC: 0.2 MG/DL

## 2025-03-12 RX ORDER — TRAMADOL HYDROCHLORIDE 50 MG/1
50 TABLET ORAL 2 TIMES DAILY PRN
Qty: 60 TABLET | Refills: 1 | Status: SHIPPED | OUTPATIENT
Start: 2025-03-12 | End: 2025-09-09

## 2025-03-12 ASSESSMENT — PATIENT HEALTH QUESTIONNAIRE - PHQ9
SUM OF ALL RESPONSES TO PHQ QUESTIONS 1-9: 1
SUM OF ALL RESPONSES TO PHQ QUESTIONS 1-9: 1
2. FEELING DOWN, DEPRESSED OR HOPELESS: SEVERAL DAYS
SUM OF ALL RESPONSES TO PHQ QUESTIONS 1-9: 1
SUM OF ALL RESPONSES TO PHQ QUESTIONS 1-9: 1
1. LITTLE INTEREST OR PLEASURE IN DOING THINGS: NOT AT ALL

## 2025-03-12 NOTE — PROGRESS NOTES
Patient Name: Liam Velasco Sr.  : 1950  MRN# [id number]    REASON FOR VISIT: 6 month follow  Patient Active Problem List    Diagnosis Date Noted    Other male erectile dysfunction 11/10/2023    Primary osteoarthritis of left knee 11/10/2023    Type 2 diabetes mellitus with diabetic polyneuropathy, without long-term current use of insulin (HCC) 2023    Spinal stenosis of lumbar region with radiculopathy 2020    Gait disturbance     Essential hypertension     Status post left knee replacement     2-vessel coronary artery disease     Obesity     CAD (coronary artery disease)     Hyperlipidemia     AGUILAR (dyspnea on exertion)     Dizziness      CURRENT SX:     LABS:  Lab Results   Component Value Date    CHOL 179 2024    TRIG 134 2024    HDL 44 2024    LDLDIRECT 93 2018   No results found for: \"LABA1C\", \"QUC3HYYU\"

## 2025-03-12 NOTE — PROGRESS NOTES
Patient ID: Liam Velasco Sr. 1950    Chief Complaint   Patient presents with    Knee Pain    Urinary Frequency     Could be from his medication that is making him go     Back Pain         History of Present Illness  The patient is a 74-year-old male who presents for evaluation of chronic knee pain and urinary frequency.    Chronic Knee Pain  - Persistent left knee pain attributed to a previous surgical intervention involving the insertion of a fenrando.  - Under the care of Dr. Chino Zapata, an orthopedic surgeon, who recommended an injection but did not administer it.  - Seeking a refill of tramadol prescription, which is beneficial for back pain.  - Uses a walker to aid in ambulation due to back condition.  - Interested in exploring alternative treatments to tramadol, preferably non-opioid options, to manage inflammation.  - Using a topical cream for knee pain, which has proven ineffective.  - Current regimen includes half a tablet of tramadol twice daily, as a full tablet induces excessive sleepiness.  - Takes Tylenol at night, which causes drowsiness but provides relief.    Urinary Frequency  - Experiencing increased urinary frequency for approximately 6 months.  - No presence of blood or pus in the urine, and no burning sensation during urination.  - Speculates that increased frequency may be due to increased water intake or medication use.  - No difficulty initiating or stopping urination, and no instances of incontinence or dribbling.  - Under the care of a urologist and undergoes regular prostate blood tests every 6 to 7 months, all of which have returned normal results.    Rash on Left Forearm  - Well-controlled with over-the-counter cortisone cream.    Supplemental information: He had a colonoscopy in 10/2024. He is under the care of a cardiologist and visits him every 6 months.    MEDICATIONS  tramadol, Tylenol      Patient Active Problem List   Diagnosis    CAD (coronary artery disease)    Hyperlipidemia

## 2025-03-14 ENCOUNTER — TELEPHONE (OUTPATIENT)
Dept: CARDIOLOGY CLINIC | Age: 75
End: 2025-03-14

## 2025-03-14 ENCOUNTER — OFFICE VISIT (OUTPATIENT)
Dept: CARDIOLOGY CLINIC | Age: 75
End: 2025-03-14
Payer: MEDICARE

## 2025-03-14 VITALS
WEIGHT: 215 LBS | HEIGHT: 72 IN | DIASTOLIC BLOOD PRESSURE: 74 MMHG | SYSTOLIC BLOOD PRESSURE: 128 MMHG | BODY MASS INDEX: 29.12 KG/M2 | HEART RATE: 80 BPM

## 2025-03-14 DIAGNOSIS — E78.2 MIXED HYPERLIPIDEMIA: ICD-10-CM

## 2025-03-14 DIAGNOSIS — I10 ESSENTIAL HYPERTENSION: ICD-10-CM

## 2025-03-14 DIAGNOSIS — I25.10 CORONARY ARTERY DISEASE INVOLVING NATIVE CORONARY ARTERY OF NATIVE HEART WITHOUT ANGINA PECTORIS: Primary | Chronic | ICD-10-CM

## 2025-03-14 DIAGNOSIS — I25.10 2-VESSEL CORONARY ARTERY DISEASE: ICD-10-CM

## 2025-03-14 DIAGNOSIS — E11.42 TYPE 2 DIABETES MELLITUS WITH DIABETIC POLYNEUROPATHY, WITHOUT LONG-TERM CURRENT USE OF INSULIN (HCC): ICD-10-CM

## 2025-03-14 DIAGNOSIS — I25.118 CORONARY ARTERY DISEASE OF NATIVE ARTERY OF NATIVE HEART WITH STABLE ANGINA PECTORIS: ICD-10-CM

## 2025-03-14 PROCEDURE — 1123F ACP DISCUSS/DSCN MKR DOCD: CPT | Performed by: INTERNAL MEDICINE

## 2025-03-14 PROCEDURE — 1160F RVW MEDS BY RX/DR IN RCRD: CPT | Performed by: INTERNAL MEDICINE

## 2025-03-14 PROCEDURE — 99214 OFFICE O/P EST MOD 30 MIN: CPT | Performed by: INTERNAL MEDICINE

## 2025-03-14 PROCEDURE — 1159F MED LIST DOCD IN RCRD: CPT | Performed by: INTERNAL MEDICINE

## 2025-03-14 PROCEDURE — 1036F TOBACCO NON-USER: CPT | Performed by: INTERNAL MEDICINE

## 2025-03-14 PROCEDURE — G8427 DOCREV CUR MEDS BY ELIG CLIN: HCPCS | Performed by: INTERNAL MEDICINE

## 2025-03-14 PROCEDURE — G8417 CALC BMI ABV UP PARAM F/U: HCPCS | Performed by: INTERNAL MEDICINE

## 2025-03-14 PROCEDURE — 3074F SYST BP LT 130 MM HG: CPT | Performed by: INTERNAL MEDICINE

## 2025-03-14 PROCEDURE — 3078F DIAST BP <80 MM HG: CPT | Performed by: INTERNAL MEDICINE

## 2025-03-14 PROCEDURE — 3046F HEMOGLOBIN A1C LEVEL >9.0%: CPT | Performed by: INTERNAL MEDICINE

## 2025-03-14 PROCEDURE — 3017F COLORECTAL CA SCREEN DOC REV: CPT | Performed by: INTERNAL MEDICINE

## 2025-03-14 PROCEDURE — 2022F DILAT RTA XM EVC RTNOPTHY: CPT | Performed by: INTERNAL MEDICINE

## 2025-03-14 RX ORDER — LIDOCAINE 50 MG/G
PATCH TOPICAL
COMMUNITY
Start: 2024-11-20

## 2025-03-14 NOTE — PROGRESS NOTES
CLINICAL STAFF DOCUMENTATION    Dr. Solo Weaver     Liam ESCOTO Velasco Sr.  1950  [id number]    Have you had any Chest Pain recently? - No  Have you had any Shortness of Breath - No  Have you had any dizziness - No  Have you had any palpitations recently? - No  Do you have any edema - swelling in feet    If Yes - CHECK TO SEE IF THE EDEMA IS PITTING  How long have they been having edema - Months  If Yes - Have they worn compression stockings No    Vein \"LEG PROBLEM Questionnaire\"  Do you have prominent leg veins?  Yes   Do you have any skin discoloration?  No  Do you have any healed or active sores? No  Do you have swelling of the legs?  Yes  Do you have a family history of varicose veins? No  Does your profession involve pro-longed        standing or heavy lifting?    Yes  7.   Have you been fighting overweight problems? No  8.   Do you have restless legs?   No  9.   Do you have any night time cramps?  No  10. Do you have any of the following in your legs:        Itching     When did you have your last labs drawn 3/25  What doctor ordered Mo  Do we have the labs in their chart Yes    Do you have a surgery or procedure scheduled in the near future - No    Do use tobacco products? - No  Do you drink alcohol? - No  Do you use any illicit drugs? - No  Caffeine? - Yes  How much caffeine? 1 cups of coffee daily     Check medication list thoroughly!!! AND RECONCILE OUTSIDE MEDICATIONS  If dose has changed change the entire order not just the MG  BE SURE TO ASK PATIENT IF THEY NEED MEDICATION REFILLS  Verify Pharmacy and update if incorrect

## 2025-03-14 NOTE — PATIENT INSTRUCTIONS
Thank you for allowing us to care for you today!   We want to ensure we can follow your treatment plan and we strive to give you the best outcomes and experience possible.   If you ever have a life threatening emergency and call 911 - for an ambulance (EMS)  REMEMBER  Our providers can only care for you at:   [facility] or [facility]   Even if you have someone take you or you drive yourself we can only care for you in a [facility] facility. Our providers are not setup at the other healthcare locations!    PLEASE CALL OUR OFFICE DURING NORMAL BUSINESS HOURS  Monday through Friday 8 am to 5 pm  AFTER HOURS the physician on-call cannot help with scheduling, rescheduling, procedure instruction questions or any type of medication need or issue.  [facility] P:[phone] - [facility] P:[phone] - [facility] P:[phone]      If you receive a survey:  We would appreciate you taking the time to share your experience concerning your provider visit in the office.    These surveys are confidential!  We are eager to improve and are counting on you to share your feedback so we can ensure you get the best care possible.  CORONARY ARTERY DISEASE:Yes   clinically stable. Patient is on optimal medical regimen ( see medication list above )  - Patient is currently  asymptomatic from CAD.            - changes in  treatment:   no, on Metoprolol & ASA           - Testing ordered:  no  [place] classification: 1     CARDIOLITE 1/2020  Normal Stress nuclear scintigraphic study suggestive of normal myocardial    perfusion.    Gated images demonstrate normal left ventricular systolic function with EF    of 60 %.      EKG: NSR 84/min, WNL     HTN:  well controlled on current medical regimen, see list above.              - changes in  treatment: Yes, on Metoprolol.      CARDIOMYOPATHY: None known   CONGESTIVE HEART FAILURE: NO KNOWN HISTORY.      VHD: No significant

## 2025-03-14 NOTE — PROGRESS NOTES
OFFICE PROGRESS NOTES      Liam is a 74 y.o. male who has    CHIEF COMPLAINT AS FOLLOWS:  CHEST PAIN: Patient denies any C/O chest pains at this time.      SOB: No C/O SOB at this time.               LEG EDEMA: C/O ankle edema with prominent leg veins, skin discoloration & itching.   PALPITATIONS:  C/O irregular heart beat.                                   DIZZINESS: Some Dizziness  along the knee pain   SYNCOPE: None   OTHER/ ADDITIONAL COMPLAINTS:                                     HPI: Patient is here for F/U on his CAD, VHD, HTN & Dyslipidemia problems.   CAD: Patient has known CAD.   HTN: Patient has known essential HTN. Has been treated with guideline recommended medical / physical/ diet therapy as stated below.  Dyslipidemia: Patient has known mixed dyslipidemia. Has been treated with guideline recommended medical / physical/ diet therapy as stated below.                Current Outpatient Medications   Medication Sig Dispense Refill    Methyl Salicylate-Lido-Menthol 0.1-4-0.5 % PTCH Apply topically      lidocaine (LIDODERM) 5 % Place onto the skin      traMADol (ULTRAM) 50 MG tablet Take 1 tablet by mouth 2 times daily as needed for Pain for up to 120 doses. Max Daily Amount: 100 mg 60 tablet 1    pravastatin (PRAVACHOL) 80 MG tablet Take 1 tablet by mouth daily 30 tablet 5    Metoprolol Tartrate 75 MG TABS Take 1 tablet by mouth 2 times daily 60 tablet 5    NONFORMULARY Capsaicin ointment for neuropathy   [facility]      Acetaminophen (TYLENOL 8 HOUR PO) Take by mouth      metFORMIN (GLUCOPHAGE) 500 MG tablet Take 1 tablet by mouth daily (with breakfast)      aspirin 81 MG tablet Take 1 tablet by mouth daily      diclofenac sodium (VOLTAREN) 1 % GEL Apply 2 g topically 4 times daily (Patient not taking: Reported on 9/12/2024) 150 g 2     No current facility-administered medications for this visit.     Allergies: Crestor [rosuvastatin calcium], Lipitor [atorvastatin], and Celebrex

## 2025-03-17 ENCOUNTER — TELEPHONE (OUTPATIENT)
Dept: CARDIOLOGY CLINIC | Age: 75
End: 2025-03-17

## 2025-03-26 ENCOUNTER — TELEMEDICINE (OUTPATIENT)
Dept: FAMILY MEDICINE CLINIC | Age: 75
End: 2025-03-26
Payer: MEDICARE

## 2025-03-26 DIAGNOSIS — Z00.00 MEDICARE ANNUAL WELLNESS VISIT, SUBSEQUENT: Primary | ICD-10-CM

## 2025-03-26 PROCEDURE — 1123F ACP DISCUSS/DSCN MKR DOCD: CPT | Performed by: FAMILY MEDICINE

## 2025-03-26 PROCEDURE — G0439 PPPS, SUBSEQ VISIT: HCPCS | Performed by: FAMILY MEDICINE

## 2025-03-26 PROCEDURE — 1159F MED LIST DOCD IN RCRD: CPT | Performed by: FAMILY MEDICINE

## 2025-03-26 PROCEDURE — 3017F COLORECTAL CA SCREEN DOC REV: CPT | Performed by: FAMILY MEDICINE

## 2025-03-26 SDOH — ECONOMIC STABILITY: FOOD INSECURITY: WITHIN THE PAST 12 MONTHS, THE FOOD YOU BOUGHT JUST DIDN'T LAST AND YOU DIDN'T HAVE MONEY TO GET MORE.: NEVER TRUE

## 2025-03-26 SDOH — ECONOMIC STABILITY: FOOD INSECURITY: WITHIN THE PAST 12 MONTHS, YOU WORRIED THAT YOUR FOOD WOULD RUN OUT BEFORE YOU GOT MONEY TO BUY MORE.: NEVER TRUE

## 2025-03-26 ASSESSMENT — PATIENT HEALTH QUESTIONNAIRE - PHQ9
SUM OF ALL RESPONSES TO PHQ QUESTIONS 1-9: 0
SUM OF ALL RESPONSES TO PHQ QUESTIONS 1-9: 0
2. FEELING DOWN, DEPRESSED OR HOPELESS: NOT AT ALL
SUM OF ALL RESPONSES TO PHQ QUESTIONS 1-9: 0
1. LITTLE INTEREST OR PLEASURE IN DOING THINGS: NOT AT ALL
SUM OF ALL RESPONSES TO PHQ QUESTIONS 1-9: 0

## 2025-03-26 NOTE — PROGRESS NOTES
Medicare Annual Wellness Visit    Liam Velasco  is here for Medicare AWV    Assessment & Plan   Medicare annual wellness visit, subsequent     No follow-ups on file.     Subjective       Patient's complete Health Risk Assessment and screening values have been reviewed and are found in Flowsheets. The following problems were reviewed today and where indicated follow up appointments were made and/or referrals ordered.    Positive Risk Factor Screenings with Interventions:    Fall Risk:  Do you feel unsteady or are you worried about falling? : (!) yes (always worries, walker prn)  2 or more falls in past year?: no  Fall with injury in past year?: no     Interventions:    Patient comments: patient states he always worries about falling and uses a walker as needed.  Reviewed medications, home hazards, visual acuity, and co-morbidities that can increase risk for falls  Patient declines any further evaluation or treatment         Controlled Medication Review:    Today's Pain Level: No data recorded   Opioid Risk: (Low risk score <55) Opioid risk score: 9    Patient is low risk for opioid use disorder or overdose.    Last PDMP Phillip as Reviewed:  Review User Review Instant Review Result   [email or web address] 3/12/2025  3:27 PM     Reviewed PDMP [1]     Last Controlled Substance Monitoring Documentation      Flowsheet Row Office Visit from 3/12/2025 in [facility]   Periodic Controlled Substance Monitoring Possible medication side effects, risk of tolerance/dependence & alternative treatments discussed., No signs of potential drug abuse or diversion identified. filed at 03/12/2025 1528                      ADL's:   Patient reports needing help with:  Select all that apply: (!) Transportation (friend)  Interventions:  Patient comments: states he has a friend who drives him.  Patient declined any further interventions or treatment    Advanced Directives:  Do you have a Living Will?: (!) No (needs a new one, in

## 2025-03-26 NOTE — PATIENT INSTRUCTIONS
Personalized Preventive Plan for Liam Velasco Sr. - 3/26/2025  Medicare offers a range of preventive health benefits. Some of the tests and screenings are paid in full while other may be subject to a deductible, co-insurance, and/or copay.  Some of these benefits include a comprehensive review of your medical history including lifestyle, illnesses that may run in your family, and various assessments and screenings as appropriate.  After reviewing your medical record and screening and assessments performed today your provider may have ordered immunizations, labs, imaging, and/or referrals for you.  A list of these orders (if applicable) as well as your Preventive Care list are included within your After Visit Summary for your review.

## 2025-04-11 ENCOUNTER — TELEPHONE (OUTPATIENT)
Dept: CARDIOLOGY CLINIC | Age: 75
End: 2025-04-11

## 2025-04-11 NOTE — TELEPHONE ENCOUNTER
Pt called and stated that he was very nervous when he spoke with Charito and wanted to know if results could be given to him again while he had someone with him. Provided results and offered an sooner appt. Pt voiced understanding and took sooner appt offered.     Nuclear stress-    Stress Combined Conclusion: The study is most consistent with myocardial ischemia. Findings suggest a moderate risk of cardiac events.    ECG: Resting ECG demonstrates normal sinus rhythm.    Stress Test: A pharmacological stress test was performed using regadenoson (Lexiscan). PO caffeine given as a reversal agent.    F/U 9/24/2025     IMPRESSION:  This is an abnormal Lexiscan Cardiolite study.  Patient's baseline EKG reveals normal sinus rhythm at 68 bpm and EKG appears to be normal.  SPECT images obtained in standard short axis, vertical and horizontal long axis views reveal moderate attenuation of Cardiolite uptake in the anterior wall area of a medium sized territory with improvement in delayed imaging suggesting ischemia of LAD area.  Gated scan performed reveals normal LV function and wall motion ejection fraction is 67%.  Please arrange office visit for review patient will probably need a diagnostic cardiac catheterization for further risk stratification.

## 2025-04-11 NOTE — TELEPHONE ENCOUNTER
Pt notified of results and OV rescheduled sooner. Earlier dates for OV offered pt declined due to transportation. OV scheduled for 5/15/25. Pt voiced understanding.     Nuclear stress-    Stress Combined Conclusion: The study is most consistent with myocardial ischemia. Findings suggest a moderate risk of cardiac events.    ECG: Resting ECG demonstrates normal sinus rhythm.    Stress Test: A pharmacological stress test was performed using regadenoson (Lexiscan). PO caffeine given as a reversal agent.    F/U 9/24/2025     IMPRESSION:  This is an abnormal Lexiscan Cardiolite study.  Patient's baseline EKG reveals normal sinus rhythm at 68 bpm and EKG appears to be normal.  SPECT images obtained in standard short axis, vertical and horizontal long axis views reveal moderate attenuation of Cardiolite uptake in the anterior wall area of a medium sized territory with improvement in delayed imaging suggesting ischemia of LAD area.  Gated scan performed reveals normal LV function and wall motion ejection fraction is 67%.  Please arrange office visit for review patient will probably need a diagnostic cardiac catheterization for further risk stratification.

## 2025-04-18 ENCOUNTER — TELEPHONE (OUTPATIENT)
Dept: CARDIOLOGY CLINIC | Age: 75
End: 2025-04-18

## 2025-04-18 NOTE — PROGRESS NOTES
Patient Name: Liam Velasco .  : 1950  MRN# [id number]    REASON FOR VISIT: Results of Testing  Patient Active Problem List    Diagnosis Date Noted    Other male erectile dysfunction 11/10/2023    Primary osteoarthritis of left knee 11/10/2023    Type 2 diabetes mellitus with diabetic polyneuropathy, without long-term current use of insulin (HCC) 2023    Spinal stenosis of lumbar region with radiculopathy 2020    Gait disturbance     Essential hypertension     Status post left knee replacement     2-vessel coronary artery disease     Obesity     CAD (coronary artery disease)     Hyperlipidemia     AGUILAR (dyspnea on exertion)     Dizziness        LABS:  Lab Results   Component Value Date    CHOL 179 2024    TRIG 134 2024    HDL 44 2024    LDLDIRECT 93 2018   No results found for: \"LABA1C\", \"[id number]\"

## 2025-04-23 ENCOUNTER — OFFICE VISIT (OUTPATIENT)
Dept: CARDIOLOGY CLINIC | Age: 75
End: 2025-04-23
Payer: MEDICARE

## 2025-04-23 VITALS
SYSTOLIC BLOOD PRESSURE: 138 MMHG | DIASTOLIC BLOOD PRESSURE: 72 MMHG | HEIGHT: 72 IN | BODY MASS INDEX: 28.99 KG/M2 | HEART RATE: 76 BPM | WEIGHT: 214 LBS

## 2025-04-23 DIAGNOSIS — I25.10 2-VESSEL CORONARY ARTERY DISEASE: ICD-10-CM

## 2025-04-23 DIAGNOSIS — E66.09 CLASS 1 OBESITY DUE TO EXCESS CALORIES WITH SERIOUS COMORBIDITY AND BODY MASS INDEX (BMI) OF 33.0 TO 33.9 IN ADULT: ICD-10-CM

## 2025-04-23 DIAGNOSIS — E78.2 MIXED HYPERLIPIDEMIA: ICD-10-CM

## 2025-04-23 DIAGNOSIS — E11.42 TYPE 2 DIABETES MELLITUS WITH DIABETIC POLYNEUROPATHY, WITHOUT LONG-TERM CURRENT USE OF INSULIN (HCC): ICD-10-CM

## 2025-04-23 DIAGNOSIS — Z01.810 PRE-OPERATIVE CARDIOVASCULAR EXAMINATION: ICD-10-CM

## 2025-04-23 DIAGNOSIS — I25.10 CORONARY ARTERY DISEASE INVOLVING NATIVE CORONARY ARTERY OF NATIVE HEART WITHOUT ANGINA PECTORIS: Chronic | ICD-10-CM

## 2025-04-23 DIAGNOSIS — R06.09 DOE (DYSPNEA ON EXERTION): ICD-10-CM

## 2025-04-23 DIAGNOSIS — I10 ESSENTIAL HYPERTENSION: Primary | ICD-10-CM

## 2025-04-23 DIAGNOSIS — R06.02 SOB (SHORTNESS OF BREATH): ICD-10-CM

## 2025-04-23 DIAGNOSIS — I83.892 VARICOSE VEINS OF LEFT LEG WITH EDEMA: ICD-10-CM

## 2025-04-23 DIAGNOSIS — R07.9 CHEST PAIN: Primary | ICD-10-CM

## 2025-04-23 DIAGNOSIS — E66.811 CLASS 1 OBESITY DUE TO EXCESS CALORIES WITH SERIOUS COMORBIDITY AND BODY MASS INDEX (BMI) OF 33.0 TO 33.9 IN ADULT: ICD-10-CM

## 2025-04-23 DIAGNOSIS — R06.02 SOB (SHORTNESS OF BREATH) ON EXERTION: ICD-10-CM

## 2025-04-23 PROCEDURE — 1123F ACP DISCUSS/DSCN MKR DOCD: CPT | Performed by: INTERNAL MEDICINE

## 2025-04-23 PROCEDURE — 3046F HEMOGLOBIN A1C LEVEL >9.0%: CPT | Performed by: INTERNAL MEDICINE

## 2025-04-23 PROCEDURE — 1036F TOBACCO NON-USER: CPT | Performed by: INTERNAL MEDICINE

## 2025-04-23 PROCEDURE — 2022F DILAT RTA XM EVC RTNOPTHY: CPT | Performed by: INTERNAL MEDICINE

## 2025-04-23 PROCEDURE — G8427 DOCREV CUR MEDS BY ELIG CLIN: HCPCS | Performed by: INTERNAL MEDICINE

## 2025-04-23 PROCEDURE — G8417 CALC BMI ABV UP PARAM F/U: HCPCS | Performed by: INTERNAL MEDICINE

## 2025-04-23 PROCEDURE — 3017F COLORECTAL CA SCREEN DOC REV: CPT | Performed by: INTERNAL MEDICINE

## 2025-04-23 PROCEDURE — 93000 ELECTROCARDIOGRAM COMPLETE: CPT | Performed by: INTERNAL MEDICINE

## 2025-04-23 PROCEDURE — 3078F DIAST BP <80 MM HG: CPT | Performed by: INTERNAL MEDICINE

## 2025-04-23 PROCEDURE — 1160F RVW MEDS BY RX/DR IN RCRD: CPT | Performed by: INTERNAL MEDICINE

## 2025-04-23 PROCEDURE — 1159F MED LIST DOCD IN RCRD: CPT | Performed by: INTERNAL MEDICINE

## 2025-04-23 PROCEDURE — 99214 OFFICE O/P EST MOD 30 MIN: CPT | Performed by: INTERNAL MEDICINE

## 2025-04-23 PROCEDURE — 3075F SYST BP GE 130 - 139MM HG: CPT | Performed by: INTERNAL MEDICINE

## 2025-04-23 NOTE — PROGRESS NOTES
OFFICE PROGRESS NOTES      Liam is a 74 y.o. male who has    CHIEF COMPLAINT AS FOLLOWS:  CHEST PAIN: Patient denies any C/O chest pains at this time.      SOB: Has SOB with exertion but no change over previous noted.            LEG EDEMA:C/O Left ankle edema with prominent leg veins, skin discoloration & itching.    PALPITATIONS: C/O  episodes of palpitations lasting 10 to 15-minute, which are frequent.   DIZZINESS:  C/O Dizziness                          SYNCOPE: None   OTHER/ ADDITIONAL COMPLAINTS:                                     HPI: Patient is here for F/U on his CAD, VHD, HTN & Dyslipidemia problems.   CAD: Patient has known CAD.   HTN: Patient has known essential HTN. Has been treated with guideline recommended medical / physical/ diet therapy as stated below.  Dyslipidemia: Patient has known mixed dyslipidemia. Has been treated with guideline recommended medical / physical/ diet therapy as stated below.                Current Outpatient Medications   Medication Sig Dispense Refill    Methyl Salicylate-Lido-Menthol 0.1-4-0.5 % PTCH Apply topically (Patient not taking: Reported on 3/26/2025)      lidocaine (LIDODERM) 5 % Place onto the skin      traMADol (ULTRAM) 50 MG tablet Take 1 tablet by mouth 2 times daily as needed for Pain for up to 120 doses. Max Daily Amount: 100 mg 60 tablet 1    pravastatin (PRAVACHOL) 80 MG tablet Take 1 tablet by mouth daily 30 tablet 5    Metoprolol Tartrate 75 MG TABS Take 1 tablet by mouth 2 times daily 60 tablet 5    diclofenac sodium (VOLTAREN) 1 % GEL Apply 2 g topically 4 times daily (Patient not taking: Reported on 3/26/2025) 150 g 2    NONFORMULARY Capsaicin ointment [facility]   [facility]      Acetaminophen (TYLENOL 8 HOUR PO) Take by mouth      metFORMIN (GLUCOPHAGE) 500 MG tablet Take 1 tablet by mouth daily (with breakfast)      aspirin 81 MG tablet Take 1 tablet by mouth daily       No current facility-administered medications for this visit.

## 2025-04-23 NOTE — PROGRESS NOTES
CLINICAL STAFF DOCUMENTATION    Dr. Solo Weaver     Liam Velasco Sr.  1950  [id number]    Have you had any Chest Pain recently? - No          Have you had any Shortness of Breath - No        Have you had any dizziness - Yes  If Yes DO ORTHOSTATIC BP including pulse  When do you feel dizzy?  Pt states at random  Does the room spin? No  How long does it last .2  minutes         Have you had any palpitations recently? - Yes, racing, duration 10 minutes, everyday.           Do you have any edema - swelling in  Left leg pt states after knee surgery.      If Yes - CHECK TO SEE IF THE EDEMA IS PITTING  How long have they been having edema - Years  If Yes - Have they worn compression stockings Yes  If they have worn compression stockings 2 Weeks        When did you have your last labs drawn 3/25  What doctor ordered Owen  Do we have the labs in their chart Yes        Do you need any prescriptions refilled? - No    Do you have a surgery or procedure scheduled in the near future - No        Do use tobacco products? - No  Do you drink alcohol? - No  Do you use any illicit drugs? - No  Caffeine? - Yes  How much caffeine? .1  cups daily.

## 2025-04-23 NOTE — PATIENT INSTRUCTIONS
Thank you for allowing us to care for you today!   We want to ensure we can follow your treatment plan and we strive to give you the best outcomes and experience possible.   If you ever have a life threatening emergency and call 911 - for an ambulance (EMS)  REMEMBER  Our providers can only care for you at:   [facility] or [facility]   Even if you have someone take you or you drive yourself we can only care for you in a [facility] facility. Our providers are not setup at the other healthcare locations!    PLEASE CALL OUR OFFICE DURING NORMAL BUSINESS HOURS  Monday through Friday 8 am to 5 pm  AFTER HOURS the physician on-call cannot help with scheduling, rescheduling, procedure instruction questions or any type of medication need or issue.  [facility] P:[phone] - [facility] P:[phone] - [facility] P:[phone]      If you receive a survey:  We would appreciate you taking the time to share your experience concerning your provider visit in the office.    These surveys are confidential!  We are eager to improve and are counting on you to share your feedback so we can ensure you get the best care possible.  CORONARY ARTERY DISEASE:Yes   clinically stable. Patient is on optimal medical regimen ( see medication list above )  - Patient is currently  asymptomatic from CAD.            - changes in  treatment:   no, on Metoprolol & ASA           - Testing ordered:  no  [place] classification: 1     CARDIOLITE 1/2020  Normal Stress nuclear scintigraphic study suggestive of normal myocardial    perfusion.    Gated images demonstrate normal left ventricular systolic function with EF    of 60 %.      EKG: NSR 84/min, WNL    4/6/2025  This is an abnormal Lexiscan Cardiolite study.  Patient's baseline EKG reveals normal sinus rhythm at 68 bpm and EKG appears to be normal.  SPECT images obtained in standard short axis, vertical and horizontal long axis

## 2025-04-24 ENCOUNTER — TELEPHONE (OUTPATIENT)
Dept: CARDIOLOGY CLINIC | Age: 75
End: 2025-04-24

## 2025-05-12 ENCOUNTER — HOSPITAL ENCOUNTER (OUTPATIENT)
Dept: GENERAL RADIOLOGY | Age: 75
Discharge: HOME | End: 2025-05-12
Payer: MEDICARE

## 2025-05-12 ENCOUNTER — HOSPITAL ENCOUNTER (OUTPATIENT)
Age: 75
Discharge: HOME | End: 2025-05-12
Payer: MEDICARE

## 2025-05-12 DIAGNOSIS — Z01.810 PRE-OPERATIVE CARDIOVASCULAR EXAMINATION: ICD-10-CM

## 2025-05-12 DIAGNOSIS — Z01.810 PRE-OPERATIVE CARDIOVASCULAR EXAMINATION: Primary | ICD-10-CM

## 2025-05-12 LAB
ABO + RH BLD: NORMAL
ANION GAP SERPL CALCULATED.3IONS-SCNC: 12 MMOL/L (ref 9–17)
BLOOD BANK COMMENT: NORMAL
BLOOD BANK SAMPLE EXPIRATION: NORMAL
BLOOD GROUP ANTIBODIES SERPL: NEGATIVE
BUN SERPL-MCNC: 13 MG/DL (ref 7–20)
CALCIUM SERPL-MCNC: 9.7 MG/DL (ref 8.3–10.6)
CHLORIDE SERPL-SCNC: 101 MMOL/L (ref 99–110)
CO2 SERPL-SCNC: 23 MMOL/L (ref 21–32)
CREAT SERPL-MCNC: 0.8 MG/DL (ref 0.8–1.3)
ERYTHROCYTE [DISTWIDTH] IN BLOOD BY AUTOMATED COUNT: 13.1 % (ref 11.7–14.9)
GFR, ESTIMATED: 89 ML/MIN/1.73M2
GLUCOSE SERPL-MCNC: 190 MG/DL (ref 74–99)
HCT VFR BLD AUTO: 44.8 % (ref 42–52)
HGB BLD-MCNC: 14 G/DL (ref 13.5–18)
MCH RBC QN AUTO: 27.6 PG (ref 27–31)
MCHC RBC AUTO-ENTMCNC: 31.3 G/DL (ref 32–36)
MCV RBC AUTO: 88.2 FL (ref 78–100)
PLATELET # BLD AUTO: 225 K/UL (ref 140–440)
PMV BLD AUTO: 10.2 FL (ref 7.5–11.1)
POTASSIUM SERPL-SCNC: 4.1 MMOL/L (ref 3.5–5.1)
RBC # BLD AUTO: 5.08 M/UL (ref 4.6–6.2)
SODIUM SERPL-SCNC: 135 MMOL/L (ref 136–145)
WBC OTHER # BLD: 7.1 K/UL (ref 4–10.5)

## 2025-05-12 PROCEDURE — 71046 X-RAY EXAM CHEST 2 VIEWS: CPT

## 2025-05-12 PROCEDURE — 86850 RBC ANTIBODY SCREEN: CPT

## 2025-05-12 PROCEDURE — 36415 COLL VENOUS BLD VENIPUNCTURE: CPT

## 2025-05-12 PROCEDURE — 85027 COMPLETE CBC AUTOMATED: CPT

## 2025-05-12 PROCEDURE — 86900 BLOOD TYPING SEROLOGIC ABO: CPT

## 2025-05-12 PROCEDURE — 86901 BLOOD TYPING SEROLOGIC RH(D): CPT

## 2025-05-12 PROCEDURE — 80048 BASIC METABOLIC PNL TOTAL CA: CPT

## 2025-05-14 NOTE — H&P
HISTORY & PHYSICAL        CHIEF COMPLAINT:SOB    HISTORY OF PRESENT ILLNESS:  Liam is a 74 y.o. male with known history of mild to moderate coronary artery disease from before was recently complaining of shortness of breath and a Cardiolite perfusion imaging performed reveals LAD territory ischemia.    4/6/2025  This is an abnormal Lexiscan Cardiolite study.  Patient's baseline EKG reveals normal sinus rhythm at 68 bpm and EKG appears to be normal.  SPECT images obtained in standard short axis, vertical and horizontal long axis views reveal moderate attenuation of Cardiolite uptake in the anterior wall area of a medium sized territory with improvement in delayed imaging suggesting ischemia of LAD area.  Gated scan performed reveals normal LV function and wall motion ejection fraction is 67%    Liam Velasco Sr. has the following history recorded in [place]:  Patient Active Problem List    Diagnosis Date Noted    2-vessel coronary artery disease     Obesity     CAD (coronary artery disease)     Hyperlipidemia     AGUILAR (dyspnea on exertion)     Dizziness     Varicose veins of left leg with edema 04/23/2025    Chest pain 04/23/2025    SOB (shortness of breath) on exertion 04/23/2025    Other male erectile dysfunction 11/10/2023    Primary osteoarthritis of left knee 11/10/2023    Type 2 diabetes mellitus with diabetic polyneuropathy, without long-term current use of insulin ([facility]) 08/16/2023    Spinal stenosis of lumbar region with radiculopathy 12/18/2020    Gait disturbance     Essential hypertension     Status post left knee replacement      No current facility-administered medications for this encounter.     Current Outpatient Medications   Medication Sig Dispense Refill    Methyl Salicylate-Lido-Menthol 0.1-4-0.5 % PTCH Apply topically (Patient not taking: Reported on 4/23/2025)      lidocaine (LIDODERM) 5 % Place onto the

## 2025-05-15 ENCOUNTER — HOSPITAL ENCOUNTER (OUTPATIENT)
Age: 75
Setting detail: OUTPATIENT SURGERY
Discharge: HOME | End: 2025-05-15
Attending: INTERNAL MEDICINE | Admitting: INTERNAL MEDICINE
Payer: MEDICARE

## 2025-05-15 VITALS
OXYGEN SATURATION: 98 % | HEART RATE: 70 BPM | BODY MASS INDEX: 29.26 KG/M2 | WEIGHT: 216 LBS | DIASTOLIC BLOOD PRESSURE: 62 MMHG | SYSTOLIC BLOOD PRESSURE: 127 MMHG | TEMPERATURE: 95.3 F | RESPIRATION RATE: 16 BRPM | HEIGHT: 72 IN

## 2025-05-15 DIAGNOSIS — R06.02 SOB (SHORTNESS OF BREATH) ON EXERTION: ICD-10-CM

## 2025-05-15 DIAGNOSIS — R07.9 CHEST PAIN: ICD-10-CM

## 2025-05-15 LAB — ECHO BSA: 2.23 M2

## 2025-05-15 PROCEDURE — 7100000010 HC PHASE II RECOVERY - FIRST 15 MIN: Performed by: INTERNAL MEDICINE

## 2025-05-15 PROCEDURE — C1894 INTRO/SHEATH, NON-LASER: HCPCS | Performed by: INTERNAL MEDICINE

## 2025-05-15 PROCEDURE — 6370000000 HC RX 637 (ALT 250 FOR IP): Performed by: INTERNAL MEDICINE

## 2025-05-15 PROCEDURE — C1769 GUIDE WIRE: HCPCS | Performed by: INTERNAL MEDICINE

## 2025-05-15 PROCEDURE — 93452 LEFT HRT CATH W/VENTRCLGRPHY: CPT | Performed by: INTERNAL MEDICINE

## 2025-05-15 PROCEDURE — 93458 L HRT ARTERY/VENTRICLE ANGIO: CPT | Performed by: INTERNAL MEDICINE

## 2025-05-15 PROCEDURE — 7100000011 HC PHASE II RECOVERY - ADDTL 15 MIN: Performed by: INTERNAL MEDICINE

## 2025-05-15 PROCEDURE — 6360000004 HC RX CONTRAST MEDICATION: Performed by: INTERNAL MEDICINE

## 2025-05-15 PROCEDURE — 2709999900 HC NON-CHARGEABLE SUPPLY: Performed by: INTERNAL MEDICINE

## 2025-05-15 PROCEDURE — 6360000002 HC RX W HCPCS: Performed by: INTERNAL MEDICINE

## 2025-05-15 PROCEDURE — 2580000003 HC RX 258: Performed by: INTERNAL MEDICINE

## 2025-05-15 RX ORDER — SODIUM CHLORIDE 9 MG/ML
INJECTION, SOLUTION INTRAVENOUS CONTINUOUS
Status: DISCONTINUED | OUTPATIENT
Start: 2025-05-15 | End: 2025-05-15 | Stop reason: HOSPADM

## 2025-05-15 RX ORDER — SODIUM CHLORIDE 0.9 % (FLUSH) 0.9 %
5-40 SYRINGE (ML) INJECTION EVERY 12 HOURS SCHEDULED
Status: DISCONTINUED | OUTPATIENT
Start: 2025-05-15 | End: 2025-05-15 | Stop reason: HOSPADM

## 2025-05-15 RX ORDER — DIAZEPAM 5 MG/1
5 TABLET ORAL ONCE
Status: COMPLETED | OUTPATIENT
Start: 2025-05-15 | End: 2025-05-15

## 2025-05-15 RX ORDER — ACETAMINOPHEN 325 MG/1
650 TABLET ORAL EVERY 4 HOURS PRN
Status: DISCONTINUED | OUTPATIENT
Start: 2025-05-15 | End: 2025-05-15 | Stop reason: HOSPADM

## 2025-05-15 RX ORDER — SODIUM CHLORIDE 9 MG/ML
INJECTION, SOLUTION INTRAVENOUS CONTINUOUS PRN
Status: COMPLETED | OUTPATIENT
Start: 2025-05-15 | End: 2025-05-15

## 2025-05-15 RX ORDER — SODIUM CHLORIDE 0.9 % (FLUSH) 0.9 %
5-40 SYRINGE (ML) INJECTION PRN
Status: DISCONTINUED | OUTPATIENT
Start: 2025-05-15 | End: 2025-05-15 | Stop reason: HOSPADM

## 2025-05-15 RX ORDER — HEPARIN SODIUM 200 [USP'U]/100ML
INJECTION, SOLUTION INTRAVENOUS PRN
Status: DISCONTINUED | OUTPATIENT
Start: 2025-05-15 | End: 2025-05-15 | Stop reason: HOSPADM

## 2025-05-15 RX ORDER — SODIUM CHLORIDE 9 MG/ML
INJECTION, SOLUTION INTRAVENOUS PRN
Status: DISCONTINUED | OUTPATIENT
Start: 2025-05-15 | End: 2025-05-15 | Stop reason: HOSPADM

## 2025-05-15 RX ORDER — DIPHENHYDRAMINE HCL 25 MG
25 TABLET ORAL ONCE
Status: COMPLETED | OUTPATIENT
Start: 2025-05-15 | End: 2025-05-15

## 2025-05-15 RX ORDER — IOPAMIDOL 755 MG/ML
INJECTION, SOLUTION INTRAVASCULAR PRN
Status: DISCONTINUED | OUTPATIENT
Start: 2025-05-15 | End: 2025-05-15 | Stop reason: HOSPADM

## 2025-05-15 RX ADMIN — DIPHENHYDRAMINE HYDROCHLORIDE 25 MG: 25 TABLET ORAL at 06:03

## 2025-05-15 RX ADMIN — DIAZEPAM 5 MG: 5 TABLET ORAL at 06:03

## 2025-05-15 RX ADMIN — SODIUM CHLORIDE: 0.9 INJECTION, SOLUTION INTRAVENOUS at 06:03

## 2025-05-15 NOTE — PLAN OF CARE
All discharge instructions explained to the patient at this time. No bleeding or hematoma noted along site. Patient walked to the bathroom without difficulty and IV removed per discharge orders. Patient denies any additional needs and all vitals stable for discharge home. Raya Dow RN 5/15/2025

## 2025-05-15 NOTE — PLAN OF CARE
Patient taken down via wheelchair to private vehicle. Patients walker also taken down for discharge home. Raya Dow RN 5/15/2025

## 2025-05-15 NOTE — DISCHARGE INSTRUCTIONS
Discharge Home Instuctions  Name:Liam Velasco .  :1950    Your instructions:    Shower only for the first 5 days, NO TUB BATHS.      Wash procedure site with mild soap, rinse and pat dry.  Do not rub over the procedure site for two (2) days.  Remove dressing in 2 days.    Site observations-Observe the area for bleeding or hematoma (lump under the skin caused by bleeding.)      A.  Painless bruising is normal.  B.  If a firmness/swelling seems to be increasing or there is bright red bleeding from site       (hold pressure over procedure site) and  CALL 911 IMMEDIATELY.    What to do after you leave the hospital:    Recommended activity: no lifting, Driving, or Strenuous exercise for 3 days.  DO NOT lift more than 10lbs.    For your procedure you may have been given a sedative to help you relax. This drug will make you sleepy. It is usually given in a vein (by IV).  Don't do anything for 24 hours that requires attention to detail. It takes time for the medicine effects to completely wear off.  Do not sign any legally binding contracts or documents over the next 24 hours.  For your safety, you should not drive or operate any machinery that could be dangerous until the medicine wears off and you can think clearly and react easily.    Follow-up with physician in 7-10 days.    Take your medication as ordered.      If you have any questions, you may call [phone] or your physicians office

## 2025-06-02 RX ORDER — METOPROLOL TARTRATE 75 MG/1
1 TABLET ORAL 2 TIMES DAILY
Qty: 60 TABLET | Refills: 5 | Status: SHIPPED | OUTPATIENT
Start: 2025-06-02

## 2025-06-02 RX ORDER — METOPROLOL TARTRATE 75 MG/1
1 TABLET ORAL 2 TIMES DAILY
Qty: 60 TABLET | Refills: 5 | OUTPATIENT
Start: 2025-06-02

## 2025-06-02 NOTE — TELEPHONE ENCOUNTER
Moh/pt/ Pt needs refill on metoprolol tartrate 75 mg   Per Karri Clarke M.D. Scripts sent to pharmacy

## 2025-06-05 RX ORDER — PRAVASTATIN SODIUM 80 MG/1
80 TABLET ORAL DAILY
Qty: 30 TABLET | Refills: 5 | Status: SHIPPED | OUTPATIENT
Start: 2025-06-05

## 2025-06-05 RX ORDER — PRAVASTATIN SODIUM 80 MG/1
80 TABLET ORAL DAILY
Qty: 90 TABLET | OUTPATIENT
Start: 2025-06-05

## 2025-06-11 NOTE — PROGRESS NOTES
Patient Name: Liam Velasco Sr.  : 1950  MRN# [id number]    REASON FOR VISIT: [facility] F/U  Patient Active Problem List    Diagnosis Date Noted    Varicose veins of left leg with edema 2025    Chest pain 2025    SOB (shortness of breath) on exertion 2025    Other male erectile dysfunction 11/10/2023    Primary osteoarthritis of left knee 11/10/2023    Type 2 diabetes mellitus with diabetic polyneuropathy, without long-term current use of insulin ([facility]) 2023    Spinal stenosis of lumbar region with radiculopathy 2020    Gait disturbance     Essential hypertension     Status post left knee replacement     2-vessel coronary artery disease     Obesity     CAD (coronary artery disease)     Hyperlipidemia     AGUILAR (dyspnea on exertion)     Dizziness      LABS:  Lab Results   Component Value Date    CHOL 179 2024    TRIG 134 2024    HDL 44 2024    LDLDIRECT 93 2018   No results found for: \"LABA1C\", \"[id number]\"

## 2025-06-20 ENCOUNTER — COMMUNITY OUTREACH (OUTPATIENT)
Dept: FAMILY MEDICINE CLINIC | Age: 75
End: 2025-06-20

## 2025-06-20 NOTE — PROGRESS NOTES
Patient's HM shows they are overdue for Colorectal Screening.   Care Everywhere and  files searched.  No results to attach to order nor HM updated.      Faxed request to [facility] at [phone].

## 2025-06-25 ENCOUNTER — OFFICE VISIT (OUTPATIENT)
Dept: CARDIOLOGY CLINIC | Age: 75
End: 2025-06-25
Payer: MEDICARE

## 2025-06-25 VITALS
SYSTOLIC BLOOD PRESSURE: 128 MMHG | WEIGHT: 218.8 LBS | BODY MASS INDEX: 29.64 KG/M2 | DIASTOLIC BLOOD PRESSURE: 72 MMHG | HEIGHT: 72 IN | HEART RATE: 72 BPM

## 2025-06-25 DIAGNOSIS — E66.811 CLASS 1 OBESITY DUE TO EXCESS CALORIES WITH SERIOUS COMORBIDITY AND BODY MASS INDEX (BMI) OF 33.0 TO 33.9 IN ADULT: ICD-10-CM

## 2025-06-25 DIAGNOSIS — I25.10 2-VESSEL CORONARY ARTERY DISEASE: ICD-10-CM

## 2025-06-25 DIAGNOSIS — E11.42 TYPE 2 DIABETES MELLITUS WITH DIABETIC POLYNEUROPATHY, WITHOUT LONG-TERM CURRENT USE OF INSULIN (HCC): ICD-10-CM

## 2025-06-25 DIAGNOSIS — I10 ESSENTIAL HYPERTENSION: ICD-10-CM

## 2025-06-25 DIAGNOSIS — I83.892 VARICOSE VEINS OF LEFT LEG WITH EDEMA: ICD-10-CM

## 2025-06-25 DIAGNOSIS — E78.2 MIXED HYPERLIPIDEMIA: ICD-10-CM

## 2025-06-25 DIAGNOSIS — E66.09 CLASS 1 OBESITY DUE TO EXCESS CALORIES WITH SERIOUS COMORBIDITY AND BODY MASS INDEX (BMI) OF 33.0 TO 33.9 IN ADULT: ICD-10-CM

## 2025-06-25 DIAGNOSIS — I25.10 CORONARY ARTERY DISEASE INVOLVING NATIVE CORONARY ARTERY OF NATIVE HEART WITHOUT ANGINA PECTORIS: Primary | Chronic | ICD-10-CM

## 2025-06-25 PROCEDURE — 1159F MED LIST DOCD IN RCRD: CPT | Performed by: INTERNAL MEDICINE

## 2025-06-25 PROCEDURE — 99214 OFFICE O/P EST MOD 30 MIN: CPT | Performed by: INTERNAL MEDICINE

## 2025-06-25 PROCEDURE — 3017F COLORECTAL CA SCREEN DOC REV: CPT | Performed by: INTERNAL MEDICINE

## 2025-06-25 PROCEDURE — 1036F TOBACCO NON-USER: CPT | Performed by: INTERNAL MEDICINE

## 2025-06-25 PROCEDURE — G8417 CALC BMI ABV UP PARAM F/U: HCPCS | Performed by: INTERNAL MEDICINE

## 2025-06-25 PROCEDURE — 3078F DIAST BP <80 MM HG: CPT | Performed by: INTERNAL MEDICINE

## 2025-06-25 PROCEDURE — 2022F DILAT RTA XM EVC RTNOPTHY: CPT | Performed by: INTERNAL MEDICINE

## 2025-06-25 PROCEDURE — 3074F SYST BP LT 130 MM HG: CPT | Performed by: INTERNAL MEDICINE

## 2025-06-25 PROCEDURE — 1123F ACP DISCUSS/DSCN MKR DOCD: CPT | Performed by: INTERNAL MEDICINE

## 2025-06-25 PROCEDURE — 3046F HEMOGLOBIN A1C LEVEL >9.0%: CPT | Performed by: INTERNAL MEDICINE

## 2025-06-25 PROCEDURE — G8427 DOCREV CUR MEDS BY ELIG CLIN: HCPCS | Performed by: INTERNAL MEDICINE

## 2025-06-25 NOTE — PATIENT INSTRUCTIONS
Thank you for allowing us to care for you today!   We want to ensure we can follow your treatment plan and we strive to give you the best outcomes and experience possible.   If you ever have a life threatening emergency and call 911 - for an ambulance (EMS)  REMEMBER  Our providers can only care for you at:   [facility] or [facility]   Even if you have someone take you or you drive yourself we can only care for you in a [facility] facility. Our providers are not setup at the other healthcare locations!    PLEASE CALL OUR OFFICE DURING NORMAL BUSINESS HOURS  Monday through Friday 8 am to 5 pm  AFTER HOURS the physician on-call cannot help with scheduling, rescheduling, procedure instruction questions or any type of medication need or issue.  [facility] P:[phone] - [facility] P:[phone] - [facility] P:[phone]      If you receive a survey:  We would appreciate you taking the time to share your experience concerning your provider visit in the office.    These surveys are confidential!  We are eager to improve and are counting on you to share your feedback so we can ensure you get the best care possible.    CORONARY ARTERY DISEASE:Yes   clinically stable. Patient is on optimal medical regimen ( see medication list above )  - Patient is currently  asymptomatic from CAD.            - changes in  treatment:   no, on Metoprolol & ASA           - Testing ordered:  no  [place] classification: 1     CARDIOLITE 1/2020  Normal Stress nuclear scintigraphic study suggestive of normal myocardial    perfusion.    Gated images demonstrate normal left ventricular systolic function with EF    of 60 %.      EKG: NSR 84/min, WNL     4/6/2025  This is an abnormal Lexiscan Cardiolite study.  Patient's baseline EKG reveals normal sinus rhythm at 68 bpm and EKG appears to be normal.  SPECT images obtained in standard short axis, vertical and horizontal long

## 2025-06-25 NOTE — PROGRESS NOTES
CLINICAL STAFF DOCUMENTATION    Dr. Solo Weaver     Liam Velasco Sr.  1950  [id number]    Have you had any Chest Pain recently? - No  Have you had any Shortness of Breath - No  Have you had any dizziness - No  Have you had any palpitations recently? - No  Do you have any edema - swelling in No      When did you have your last labs drawn 5/25  What doctor ordered Owen  Do we have the labs in their chart Yes  If we do not have the labs, ask where they were drawn     Do you need any prescriptions refilled? - No    Do you have a surgery or procedure scheduled in the near future - No    Do use tobacco products? - No  Do you drink alcohol? - No  Do you use any illicit drugs? - No  Caffeine? - Yes  How much caffeine? 1 cups of coffee daily     Check medication list thoroughly!!! AND RECONCILE OUTSIDE MEDICATIONS  If dose has changed change the entire order not just the MG  BE SURE TO ASK PATIENT IF THEY NEED MEDICATION REFILLS  Verify Pharmacy and update if incorrect

## 2025-06-25 NOTE — PROGRESS NOTES
OFFICE PROGRESS NOTES      Liam is a 74 y.o. male who has    CHIEF COMPLAINT AS FOLLOWS:  CHEST PAIN: Patient denies any C/O chest pains at this time.      SOB: Has SOB with exertion but no change over previous noted.            LEG EDEMA:C/O Left ankle edema with prominent leg veins, skin discoloration & itching.    PALPITATIONS: C/O  episodes of palpitations lasting 10 to 15-minute, which are frequent.   DIZZINESS:  C/O Dizziness     SYNCOPE: None   OTHER/ ADDITIONAL COMPLAINTS:                                     HPI: Patient is here for F/U on his CAD, VHD, HTN & Dyslipidemia problems.   CAD: Patient has known CAD.   HTN: Patient has known essential HTN. Has been treated with guideline recommended medical / physical/ diet therapy as stated below.  Dyslipidemia: Patient has known mixed dyslipidemia. Has been treated with guideline recommended medical / physical/ diet therapy as stated below.                Current Outpatient Medications   Medication Sig Dispense Refill    pravastatin (PRAVACHOL) 80 MG tablet Take 1 tablet by mouth daily 30 tablet 5    Metoprolol Tartrate 75 MG TABS Take 1 tablet by mouth 2 times daily 60 tablet 5    traMADol (ULTRAM) 50 MG tablet Take 1 tablet by mouth 2 times daily as needed for Pain for up to 120 doses. Max Daily Amount: 100 mg 60 tablet 1    NONFORMULARY Capsaicin ointment for neuropathy   [facility]      Acetaminophen (TYLENOL 8 HOUR PO) Take by mouth      metFORMIN (GLUCOPHAGE) 500 MG tablet Take 1 tablet by mouth daily (with breakfast)      aspirin 81 MG tablet Take 1 tablet by mouth daily      Methyl Salicylate-Lido-Menthol 0.1-4-0.5 % PTCH Apply topically (Patient not taking: Reported on 6/25/2025)      lidocaine (LIDODERM) 5 % Place onto the skin (Patient not taking: Reported on 6/25/2025)      diclofenac sodium (VOLTAREN) 1 % GEL Apply 2 g topically 4 times daily (Patient not taking: Reported on 9/12/2024) 150 g 2     No current facility-administered medications

## 2025-08-04 DIAGNOSIS — M25.562 LEFT KNEE PAIN, UNSPECIFIED CHRONICITY: ICD-10-CM

## 2025-08-06 ENCOUNTER — TELEPHONE (OUTPATIENT)
Dept: FAMILY MEDICINE CLINIC | Age: 75
End: 2025-08-06

## 2025-08-07 RX ORDER — TRAMADOL HYDROCHLORIDE 50 MG/1
50 TABLET ORAL 2 TIMES DAILY PRN
Qty: 60 TABLET | Refills: 1 | OUTPATIENT
Start: 2025-08-07 | End: 2026-02-04

## 2025-08-13 ENCOUNTER — OFFICE VISIT (OUTPATIENT)
Dept: FAMILY MEDICINE CLINIC | Age: 75
End: 2025-08-13

## 2025-08-13 VITALS
WEIGHT: 218 LBS | DIASTOLIC BLOOD PRESSURE: 72 MMHG | OXYGEN SATURATION: 97 % | HEART RATE: 77 BPM | BODY MASS INDEX: 29.56 KG/M2 | SYSTOLIC BLOOD PRESSURE: 132 MMHG

## 2025-08-13 DIAGNOSIS — Z51.81 ENCOUNTER FOR THERAPEUTIC DRUG MONITORING: Primary | ICD-10-CM

## 2025-08-13 DIAGNOSIS — R73.09 ABNORMAL GLUCOSE: ICD-10-CM

## 2025-08-13 DIAGNOSIS — M54.9 CHRONIC BACK PAIN, UNSPECIFIED BACK LOCATION, UNSPECIFIED BACK PAIN LATERALITY: ICD-10-CM

## 2025-08-13 DIAGNOSIS — M25.562 LEFT KNEE PAIN, UNSPECIFIED CHRONICITY: ICD-10-CM

## 2025-08-13 DIAGNOSIS — Z96.652 STATUS POST LEFT KNEE REPLACEMENT: ICD-10-CM

## 2025-08-13 DIAGNOSIS — G89.29 CHRONIC BACK PAIN, UNSPECIFIED BACK LOCATION, UNSPECIFIED BACK PAIN LATERALITY: ICD-10-CM

## 2025-08-13 LAB
ALCOHOL URINE: NORMAL
AMPHETAMINE SCREEN URINE: NORMAL
BARBITURATE SCREEN URINE: NEGATIVE
BENZODIAZEPINE SCREEN, URINE: NEGATIVE
BUPRENORPHINE URINE: NEGATIVE
COCAINE METABOLITE SCREEN URINE: NEGATIVE
FENTANYL SCREEN, URINE: NORMAL
GABAPENTIN SCREEN, URINE: NORMAL
HBA1C MFR BLD: 6.1 %
MDMA, URINE: NEGATIVE
METHADONE SCREEN, URINE: NEGATIVE
METHAMPHETAMINE, URINE: NEGATIVE
OPIATE SCREEN URINE: NEGATIVE
OXYCODONE SCREEN URINE: NEGATIVE
PHENCYCLIDINE SCREEN URINE: NEGATIVE
PROPOXYPHENE SCREEN, URINE: NEGATIVE
SYNTHETIC CANNABINOIDS(K2) SCREEN, URINE: NORMAL
THC SCREEN, URINE: NEGATIVE
TRAMADOL SCREEN URINE: NORMAL
TRICYCLIC ANTIDEPRESSANTS, UR: NEGATIVE

## 2025-08-13 RX ORDER — ZOLPIDEM TARTRATE 10 MG/1
5 TABLET ORAL NIGHTLY PRN
COMMUNITY
Start: 2025-07-16

## 2025-08-13 RX ORDER — TRAMADOL HYDROCHLORIDE 50 MG/1
50 TABLET ORAL 2 TIMES DAILY PRN
Qty: 60 TABLET | Refills: 2 | Status: CANCELLED | OUTPATIENT
Start: 2025-08-13 | End: 2025-11-13

## 2025-08-13 RX ORDER — TRAMADOL HYDROCHLORIDE 50 MG/1
50 TABLET ORAL 2 TIMES DAILY
Qty: 60 TABLET | Refills: 2 | Status: SHIPPED | OUTPATIENT
Start: 2025-08-13 | End: 2025-10-12

## (undated) DEVICE — Device

## (undated) DEVICE — CATHETER ANGIO 4FR L100CM S STL NYL JL5 3 SEG BRAID SFT

## (undated) DEVICE — GUIDEWIRE VASC L150CM DIA0.035IN FLX END L7CM J 3MM PTFE

## (undated) DEVICE — PINNACLE INTRODUCER SHEATH: Brand: PINNACLE

## (undated) DEVICE — CATHETER DIAG AD 4FR L110CM 145DEG COR RED HYDRPHLC NYL

## (undated) DEVICE — CATHETER ANGIO 4FR L100CM COR NYL AR MOD W/O SIDE H RADPQ

## (undated) DEVICE — ANGIOGRAPHY KIT CUST MANIFOLD